# Patient Record
Sex: MALE | Race: WHITE | NOT HISPANIC OR LATINO | Employment: OTHER | URBAN - METROPOLITAN AREA
[De-identification: names, ages, dates, MRNs, and addresses within clinical notes are randomized per-mention and may not be internally consistent; named-entity substitution may affect disease eponyms.]

---

## 2017-09-28 ENCOUNTER — OFFICE VISIT (OUTPATIENT)
Dept: URGENT CARE | Facility: CLINIC | Age: 62
End: 2017-09-28
Payer: MEDICARE

## 2017-09-28 VITALS
SYSTOLIC BLOOD PRESSURE: 122 MMHG | HEART RATE: 69 BPM | BODY MASS INDEX: 27.28 KG/M2 | RESPIRATION RATE: 18 BRPM | TEMPERATURE: 99 F | WEIGHT: 180 LBS | HEIGHT: 68 IN | OXYGEN SATURATION: 98 % | DIASTOLIC BLOOD PRESSURE: 75 MMHG

## 2017-09-28 DIAGNOSIS — G43.011 INTRACTABLE MIGRAINE WITHOUT AURA AND WITH STATUS MIGRAINOSUS: Primary | ICD-10-CM

## 2017-09-28 PROCEDURE — 96372 THER/PROPH/DIAG INJ SC/IM: CPT | Mod: S$GLB,,, | Performed by: SURGERY

## 2017-09-28 PROCEDURE — 99203 OFFICE O/P NEW LOW 30 MIN: CPT | Mod: 25,S$GLB,, | Performed by: SURGERY

## 2017-09-28 RX ORDER — OMEPRAZOLE 20 MG/1
20 CAPSULE, DELAYED RELEASE ORAL DAILY
COMMUNITY
End: 2019-07-10

## 2017-09-28 RX ORDER — BUTALBITAL, ACETAMINOPHEN AND CAFFEINE 50; 325; 40 MG/1; MG/1; MG/1
2 TABLET ORAL EVERY 6 HOURS PRN
Qty: 30 TABLET | Refills: 0 | Status: SHIPPED | OUTPATIENT
Start: 2017-09-28 | End: 2017-10-05

## 2017-09-28 RX ORDER — VALPROIC ACID 250 MG/1
250 CAPSULE, LIQUID FILLED ORAL 4 TIMES DAILY
COMMUNITY
End: 2022-01-28 | Stop reason: SDUPTHER

## 2017-09-28 RX ORDER — DEXAMETHASONE SODIUM PHOSPHATE 100 MG/10ML
10 INJECTION INTRAMUSCULAR; INTRAVENOUS ONCE
Status: COMPLETED | OUTPATIENT
Start: 2017-09-28 | End: 2017-09-28

## 2017-09-28 RX ORDER — LEVOTHYROXINE SODIUM 112 UG/1
112 TABLET ORAL DAILY
COMMUNITY
End: 2019-07-10 | Stop reason: DRUGHIGH

## 2017-09-28 RX ORDER — MIRTAZAPINE 15 MG/1
15 TABLET, FILM COATED ORAL NIGHTLY
COMMUNITY
End: 2019-07-10 | Stop reason: DRUGHIGH

## 2017-09-28 RX ORDER — ATORVASTATIN CALCIUM 10 MG/1
10 TABLET, FILM COATED ORAL DAILY
COMMUNITY
End: 2019-07-10 | Stop reason: ALTCHOICE

## 2017-09-28 RX ORDER — LISINOPRIL 40 MG/1
40 TABLET ORAL DAILY
COMMUNITY
End: 2019-07-10

## 2017-09-28 RX ORDER — LORAZEPAM 1 MG/1
2 TABLET ORAL 2 TIMES DAILY
COMMUNITY
End: 2019-07-10 | Stop reason: SDUPTHER

## 2017-09-28 RX ADMIN — DEXAMETHASONE SODIUM PHOSPHATE 10 MG: 100 INJECTION INTRAMUSCULAR; INTRAVENOUS at 12:09

## 2017-09-28 NOTE — PATIENT INSTRUCTIONS
"  Migraine Headache: Stages and Treatment    A migraine headache tends to progress in stages. Learning these stages can help you better understand what is happening. Then you can learn ways to reduce pain and relieve other symptoms. Methods for relieving your symptoms include self-care and medicines.  Migraine stages  Migraines tend to progress through 4 stages. Many people don't have all stages, and stages may differ with each headache:  · Prodrome. A few hours to a day or so before the headache, you may feel tired, (yawning many times), uneasy, or shabazz. You may also feel bloated or crave certain foods.  · Aura. Up to an hour before the headache starts, some migraine sufferers experience aura--flashing lights, blind spots, other vision problems, confusion, difficulty speaking, or other neurologic symptoms.  · Headache. Moderate to severe pain affects one side of the head and then can spread to both sides, often along with nausea. You may be highly sensitive to light, sound, and odors. Vomiting or diarrhea may also happen. This stage lasts 4 to 72 hours.  · Postdrome. After your headache ends, you may feel tired, achy, and "washed out." This may last for a day or so.  Self-care during a migraine  Here is what you can do:  · Use a cold compress. Wrap a thin cloth around a cold pack, a cold can of soda, or a bag of frozen vegetables. Apply this to your temple or other pain site.  · Drink fluids. If nausea makes it hard to drink, try sucking on ice.  · Rest. If possible, lie down. Try not to bend over, as this may increase your pain. Sometimes laying in a dark quiet room can help the migraine from being aggravated.    · Try caffeine. Some people find that drinking fluids with caffeine, such as coffee or tea, helps to lessen migraine pain.  Using medicines  Work with your healthcare provider to find the right medicines for you. Medicines for migraine may relieve pain (analgesics), relieve nausea, or attack the " migraine's root causes (migraine-specific medicines).  Rebound headache  Taking analgesics each day, or even several times a week, may lead to more frequent and severe headaches. These are called rebound headaches. If you think you're having rebound headaches, tell your healthcare provider. He or she can help you safely decrease your medicine. Rebound caffeine withdrawal headaches can also happen.    Date Last Reviewed: 10/9/2015  © 8137-0536 [employer]. [address] [place] [address], [place] [address]. All rights reserved. This information is not intended as a substitute for professional medical care. Always follow your healthcare professional's instructions.

## 2017-09-28 NOTE — PROGRESS NOTES
"Subjective:       Patient ID: Anthony J Rockweiler is a 62 y.o. male.    Vitals:  height is 5' 8" (1.727 m) and weight is 81.6 kg (180 lb). His temperature is 98.5 °F (36.9 °C). His blood pressure is 122/75 and his pulse is 69. His respiration is 18 and oxygen saturation is 98%.     Chief Complaint: Headache    Headache    This is a chronic problem. The current episode started yesterday. The problem occurs constantly. The problem has been gradually worsening. The pain is located in the occipital and frontal region. The pain does not radiate. The pain quality is similar to prior headaches. The quality of the pain is described as pulsating and dull. The pain is at a severity of 7/10. The pain is moderate. Associated symptoms include nausea and scalp tenderness. Pertinent negatives include no blurred vision, dizziness, fever, neck pain, numbness, photophobia, seizures, tinnitus, vomiting or weakness. Nothing aggravates the symptoms. He has tried nothing for the symptoms. The treatment provided no relief. His past medical history is significant for migraine headaches.     Review of Systems   Constitution: Negative for chills, fever and weakness.   HENT: Negative for congestion and tinnitus.    Eyes: Negative for blurred vision and photophobia.   Skin: Negative for rash.   Musculoskeletal: Negative for neck pain.   Gastrointestinal: Positive for nausea. Negative for vomiting.   Neurological: Positive for headaches. Negative for disturbances in coordination, dizziness, numbness and seizures.   Psychiatric/Behavioral: Negative for altered mental status. The patient is not nervous/anxious.        Objective:      Physical Exam   Constitutional: He is oriented to person, place, and time. He appears well-developed and well-nourished. He is cooperative.  Non-toxic appearance. He does not appear ill. No distress.   HENT:   Head: Normocephalic and atraumatic.   Right Ear: Hearing, tympanic membrane, external ear and ear canal " normal.   Left Ear: Hearing, tympanic membrane, external ear and ear canal normal.   Nose: Nose normal. No mucosal edema, rhinorrhea or nasal deformity. No epistaxis. Right sinus exhibits no maxillary sinus tenderness and no frontal sinus tenderness. Left sinus exhibits no maxillary sinus tenderness and no frontal sinus tenderness.   Mouth/Throat: Uvula is midline, oropharynx is clear and moist and mucous membranes are normal. No trismus in the jaw. Normal dentition. No uvula swelling. No posterior oropharyngeal erythema.   Eyes: Conjunctivae and lids are normal. Right eye exhibits no discharge. Left eye exhibits no discharge. No scleral icterus.   Sclera clear bilat   Neck: Trachea normal, normal range of motion, full passive range of motion without pain and phonation normal. Neck supple.   Cardiovascular: Normal rate, regular rhythm, normal heart sounds, intact distal pulses and normal pulses.    Pulmonary/Chest: Effort normal and breath sounds normal. No respiratory distress.   Abdominal: Soft. Normal appearance and bowel sounds are normal. He exhibits no distension, no pulsatile midline mass and no mass. There is no tenderness.   Musculoskeletal: Normal range of motion. He exhibits no edema or deformity.   Neurological: He is alert and oriented to person, place, and time. He exhibits normal muscle tone. Coordination normal.   Skin: Skin is warm, dry and intact. He is not diaphoretic. No pallor.   Psychiatric: He has a normal mood and affect. His speech is normal and behavior is normal. Judgment and thought content normal. Cognition and memory are normal.   Nursing note and vitals reviewed.      Assessment:       1. Intractable migraine without aura and with status migrainosus        Plan:         Intractable migraine without aura and with status migrainosus  -     dexamethasone injection 10 mg; Inject 1 mL (10 mg total) into the muscle once.

## 2017-10-02 LAB — CRC RECOMMENDATION EXT: NORMAL

## 2017-10-04 ENCOUNTER — OFFICE VISIT (OUTPATIENT)
Dept: URGENT CARE | Facility: CLINIC | Age: 62
End: 2017-10-04
Payer: MEDICARE

## 2017-10-04 VITALS
RESPIRATION RATE: 18 BRPM | OXYGEN SATURATION: 100 % | SYSTOLIC BLOOD PRESSURE: 111 MMHG | HEART RATE: 67 BPM | TEMPERATURE: 97 F | WEIGHT: 180 LBS | HEIGHT: 68 IN | BODY MASS INDEX: 27.28 KG/M2 | DIASTOLIC BLOOD PRESSURE: 74 MMHG

## 2017-10-04 DIAGNOSIS — G43.909 MIGRAINE WITHOUT STATUS MIGRAINOSUS, NOT INTRACTABLE, UNSPECIFIED MIGRAINE TYPE: Primary | ICD-10-CM

## 2017-10-04 DIAGNOSIS — M54.9 UPPER BACK PAIN ON RIGHT SIDE: ICD-10-CM

## 2017-10-04 PROCEDURE — 99203 OFFICE O/P NEW LOW 30 MIN: CPT | Mod: S$GLB,,, | Performed by: FAMILY MEDICINE

## 2017-10-04 RX ORDER — OXYCODONE AND ACETAMINOPHEN 5; 325 MG/1; MG/1
1 TABLET ORAL EVERY 8 HOURS PRN
Qty: 20 TABLET | Refills: 0 | Status: SHIPPED | OUTPATIENT
Start: 2017-10-04 | End: 2017-10-11

## 2017-10-04 RX ORDER — CYCLOBENZAPRINE HCL 10 MG
10 TABLET ORAL NIGHTLY
Qty: 30 TABLET | Refills: 0 | Status: SHIPPED | OUTPATIENT
Start: 2017-10-04 | End: 2017-11-03

## 2017-10-04 RX ORDER — ONDANSETRON 4 MG/1
4 TABLET, ORALLY DISINTEGRATING ORAL EVERY 8 HOURS PRN
Qty: 20 TABLET | Refills: 0 | Status: SHIPPED | OUTPATIENT
Start: 2017-10-04 | End: 2017-10-11
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
## 2017-10-04 NOTE — PATIENT INSTRUCTIONS

## 2017-11-29 ENCOUNTER — OFFICE VISIT (OUTPATIENT)
Dept: URGENT CARE | Facility: CLINIC | Age: 62
End: 2017-11-29
Payer: MEDICARE

## 2017-11-29 VITALS
HEART RATE: 67 BPM | TEMPERATURE: 97 F | HEIGHT: 68 IN | WEIGHT: 180 LBS | DIASTOLIC BLOOD PRESSURE: 90 MMHG | SYSTOLIC BLOOD PRESSURE: 133 MMHG | BODY MASS INDEX: 27.28 KG/M2 | OXYGEN SATURATION: 98 %

## 2017-11-29 DIAGNOSIS — G43.909 MIGRAINE WITHOUT STATUS MIGRAINOSUS, NOT INTRACTABLE, UNSPECIFIED MIGRAINE TYPE: Primary | ICD-10-CM

## 2017-11-29 PROCEDURE — 99214 OFFICE O/P EST MOD 30 MIN: CPT | Mod: 25,S$GLB,, | Performed by: NURSE PRACTITIONER

## 2017-11-29 PROCEDURE — 96372 THER/PROPH/DIAG INJ SC/IM: CPT | Mod: S$GLB,,, | Performed by: EMERGENCY MEDICINE

## 2017-11-29 RX ORDER — KETOROLAC TROMETHAMINE 30 MG/ML
30 INJECTION, SOLUTION INTRAMUSCULAR; INTRAVENOUS
Status: COMPLETED | OUTPATIENT
Start: 2017-11-29 | End: 2017-11-29

## 2017-11-29 RX ORDER — ONDANSETRON 2 MG/ML
4 INJECTION INTRAMUSCULAR; INTRAVENOUS
Status: COMPLETED | OUTPATIENT
Start: 2017-11-29 | End: 2017-11-29

## 2017-11-29 RX ADMIN — ONDANSETRON 4 MG: 2 INJECTION INTRAMUSCULAR; INTRAVENOUS at 04:11

## 2017-11-29 RX ADMIN — KETOROLAC TROMETHAMINE 30 MG: 30 INJECTION, SOLUTION INTRAMUSCULAR; INTRAVENOUS at 04:11

## 2017-11-29 NOTE — PATIENT INSTRUCTIONS
Please return here or go to the Emergency Department for any concerns or worsening of condition.  If you were prescribed antibiotics, please take them to completion.  Please drink plenty of fluids.  Please get plenty of rest.  If not allergic, please take over the counter Tylenol (Acetaminophen) and/or Motrin (Ibuprofen) as directed for control of pain and/or fever.  Please follow up with your primary care doctor or specialist as needed.    If you  smoke, please stop smoking.      Migraine Headache  This often severe type of headache is different from other types of headaches in that symptoms other than pain occur with the headache. Nausea and vomiting, lightheadedness, sensitivity to light (photophobia), and other visual disturbances are common migraine symptoms. The pain may last from a few hours to several days. It is not clear why migraines occur but certain factors called triggers can raise the risk of having a migraine attack. A migraine may be triggered by emotional stress or depression, or by hormone changes during the menstrual cycle. Other triggers include birth control pills, overuse of migraine medicines, alcohol or caffeine, foods with tyramine (such as aged cheese and wine), eyestrain, weather changes, missed meals, or too little or too much sleep.  Home care  Follow these tips when taking care of yourself at home:  · Dont drive yourself home if you were given pain medicine for your headache or are having visual symptoms. Instead, have someone else drive you home. Try to sleep when you get home. You should feel much better when you wake up.  · Cold can help ease migraine symptoms. Put an ice pack on your forehead or at the base of your skull. Put heat on the back of your neck to help ease any neck spasm.  · Drink only clear liquids or eat a light diet until your symptoms get better. This will help you avoid nausea and vomiting.  How to prevent migraines  Pay attention to what seems to trigger your  headache. Try to avoid the triggers when you can. If you have frequent headaches, consider keeping a headache diary. In it, write down what you were doing, feeling, or eating in the hours before each headache. Show this to your healthcare provider to help find the cause of your headaches.  If stress seems to be a trigger for your headaches, figure out what is causing stress in your life. Learn new ways to handle your stress. Ideas include regular exercise, biofeedback, self-hypnosis, yoga, and meditation. Talk with your healthcare provider to find out more information about managing stress. Many books and digital media are also available on this subject.  Tyramine is a substance found in many foods. It can trigger a migraine in some people. These foods contain tyramine:  · Chocolate  · Yogurt  · All cheeses, but especially aged cheeses  · Smoked or pickled fish and meat, including herring, caviar, bologna, pepperoni, and salami  · Liver  · Avocados  · Bananas  · Figs  · Raisins  · Red wine  Try staying away from these foods for 1 to 2 months to see if you have fewer headaches.  How to treat future headaches  · Take time out at the first sign of a headache, if possible. Find a quiet, dark, comfortable place to sit or lie down. Let yourself relax or sleep.  · Put an ice pack on your forehead or on the area of greatest pain. A heating pad and massage may help if you are having a muscle spasm and tightness in your neck.  · If you have been prescribed a medicine to stop a migraine headache, use this at the first warning sign of the headache for best results. First signs may be an aura or pain.  · If you need to take medicine often for your migraine, talk with your healthcare provider about other ways to prevent your headaches.  Follow-up care  Follow up with your healthcare provider, or as advised. Talk with your provider if you have frequent headaches. He or she can figure out a treatment plan. Ask if you can have  medicine to take at home the next time you get a bad headache. This may keep you from having to visit the emergency department in the future. You may need to see a headache specialist (neurologist) if you continue to have headaches.  When to seek medical advice  Call your healthcare provider right away if any of these occur:  · Your head pain gets worse, or doesnt get better within 24 hours  · You cant keep liquids down (repeated vomiting)  · Pain in your sinuses, ears, or throat  · Fever of 100.4º F (38º C) or higher, or as directed by your healthcare provider  · Stiff neck  · Extreme drowsiness, confusion, or fainting  · Dizziness, or dizziness with spinning sensation (vertigo)  · Weakness in an arm or leg, or on one side of your face  · Difficulty talking or seeing  Date Last Reviewed: 8/1/2016  © 2285-9798 [employer]. [address] [place] [address]. All rights reserved. This information is not intended as a substitute for professional medical care. Always follow your healthcare professional's instructions.

## 2017-11-29 NOTE — PROGRESS NOTES
"Subjective:       Patient ID: Anthony J Rockweiler is a 62 y.o. male.    Vitals:  height is 5' 8" (1.727 m) and weight is 81.6 kg (180 lb). His temperature is 97.1 °F (36.2 °C). His blood pressure is 133/90 (abnormal) and his pulse is 67. His oxygen saturation is 98%.     Chief Complaint: Headache    Mr Rockweiler comes to the clinic with complaint of occipital head pain x 1 day (this AM).  He took Excedrin which he states usually helps, but feels he may have been too late and symptoms have persisted since that time.  He denies prodrome, but endorses nausea and photophobia.  He appears very uncomfortable. He states this is a chronic issue and he is seen by a Neurologist and will contact them soon as this is the third time in so many months he has been seen for the same issue.      Headache    This is a recurrent problem. The current episode started today. The problem occurs constantly. The problem has been gradually worsening. Pain location: whole head back and forth. Radiates to: all around the head. The pain quality is similar to prior headaches. The quality of the pain is described as throbbing. The pain is at a severity of 10/10. The pain is severe. Associated symptoms include nausea and photophobia. Pertinent negatives include no abdominal pain, back pain, blurred vision, fever, sore throat or vomiting. Exacerbated by: walking. Treatments tried: excedrin. The treatment provided no relief.     Review of Systems   Constitution: Negative for chills and fever.   HENT: Negative for sore throat.    Eyes: Positive for photophobia. Negative for blurred vision.   Cardiovascular: Negative for chest pain.   Respiratory: Negative for shortness of breath.    Skin: Negative for rash.   Musculoskeletal: Negative for back pain and joint pain.   Gastrointestinal: Positive for nausea. Negative for abdominal pain, diarrhea and vomiting.   Neurological: Positive for headaches.   Psychiatric/Behavioral: The patient is not " nervous/anxious.    All other systems reviewed and are negative.      Objective:      Physical Exam   Constitutional: He is oriented to person, place, and time. He appears well-developed and well-nourished. He is cooperative.  Non-toxic appearance. He does not appear ill. No distress.   HENT:   Head: Normocephalic and atraumatic.   Right Ear: Hearing, tympanic membrane, external ear and ear canal normal.   Left Ear: Hearing, tympanic membrane, external ear and ear canal normal.   Nose: Nose normal. No mucosal edema, rhinorrhea or nasal deformity. No epistaxis. Right sinus exhibits no maxillary sinus tenderness and no frontal sinus tenderness. Left sinus exhibits no maxillary sinus tenderness and no frontal sinus tenderness.   Mouth/Throat: Uvula is midline and mucous membranes are normal. No trismus in the jaw. Normal dentition. No uvula swelling. No posterior oropharyngeal erythema.   No temporal TTP   Eyes: Conjunctivae, EOM and lids are normal. Pupils are equal, round, and reactive to light. No scleral icterus. Right eye exhibits no nystagmus. Left eye exhibits no nystagmus.   Sclera clear bilat   Neck: Trachea normal, normal range of motion, full passive range of motion without pain and phonation normal. Neck supple. No neck rigidity.   Cardiovascular: Normal rate, regular rhythm, normal heart sounds, intact distal pulses and normal pulses.    Pulmonary/Chest: Effort normal and breath sounds normal. No respiratory distress. He has no decreased breath sounds. He has no wheezes. He has no rhonchi. He has no rales.   Abdominal: Soft. Normal appearance and bowel sounds are normal. He exhibits no distension. There is no tenderness.   Musculoskeletal: Normal range of motion. He exhibits no edema or deformity.   Neurological: He is alert and oriented to person, place, and time. He displays no tremor. No cranial nerve deficit. He exhibits normal muscle tone. Coordination normal.   Skin: Skin is warm, dry and intact. He  is not diaphoretic. No pallor.   Psychiatric: He has a normal mood and affect. His speech is normal and behavior is normal. Judgment and thought content normal. Cognition and memory are normal.   Nursing note and vitals reviewed.      Assessment:       1. Migraine without status migrainosus, not intractable, unspecified migraine type        Plan:         Migraine without status migrainosus, not intractable, unspecified migraine type  -     ondansetron injection 4 mg; Inject 4 mg into the muscle one time.  -     ketorolac injection 30 mg; Inject 30 mg into the muscle one time.      - discussed follow up with his neurologist at first available appointment given the number of migraines over the last 3 months    Patient Instructions   Please return here or go to the Emergency Department for any concerns or worsening of condition.  If you were prescribed antibiotics, please take them to completion.  Please drink plenty of fluids.  Please get plenty of rest.  If not allergic, please take over the counter Tylenol (Acetaminophen) and/or Motrin (Ibuprofen) as directed for control of pain and/or fever.  Please follow up with your primary care doctor or specialist as needed.    If you  smoke, please stop smoking.      Migraine Headache  This often severe type of headache is different from other types of headaches in that symptoms other than pain occur with the headache. Nausea and vomiting, lightheadedness, sensitivity to light (photophobia), and other visual disturbances are common migraine symptoms. The pain may last from a few hours to several days. It is not clear why migraines occur but certain factors called triggers can raise the risk of having a migraine attack. A migraine may be triggered by emotional stress or depression, or by hormone changes during the menstrual cycle. Other triggers include birth control pills, overuse of migraine medicines, alcohol or caffeine, foods with tyramine (such as aged cheese and  wine), eyestrain, weather changes, missed meals, or too little or too much sleep.  Home care  Follow these tips when taking care of yourself at home:  · Dont drive yourself home if you were given pain medicine for your headache or are having visual symptoms. Instead, have someone else drive you home. Try to sleep when you get home. You should feel much better when you wake up.  · Cold can help ease migraine symptoms. Put an ice pack on your forehead or at the base of your skull. Put heat on the back of your neck to help ease any neck spasm.  · Drink only clear liquids or eat a light diet until your symptoms get better. This will help you avoid nausea and vomiting.  How to prevent migraines  Pay attention to what seems to trigger your headache. Try to avoid the triggers when you can. If you have frequent headaches, consider keeping a headache diary. In it, write down what you were doing, feeling, or eating in the hours before each headache. Show this to your healthcare provider to help find the cause of your headaches.  If stress seems to be a trigger for your headaches, figure out what is causing stress in your life. Learn new ways to handle your stress. Ideas include regular exercise, biofeedback, self-hypnosis, yoga, and meditation. Talk with your healthcare provider to find out more information about managing stress. Many books and digital media are also available on this subject.  Tyramine is a substance found in many foods. It can trigger a migraine in some people. These foods contain tyramine:  · Chocolate  · Yogurt  · All cheeses, but especially aged cheeses  · Smoked or pickled fish and meat, including herring, caviar, bologna, pepperoni, and salami  · Liver  · Avocados  · Bananas  · Figs  · Raisins  · Red wine  Try staying away from these foods for 1 to 2 months to see if you have fewer headaches.  How to treat future headaches  · Take time out at the first sign of a headache, if possible. Find a quiet,  dark, comfortable place to sit or lie down. Let yourself relax or sleep.  · Put an ice pack on your forehead or on the area of greatest pain. A heating pad and massage may help if you are having a muscle spasm and tightness in your neck.  · If you have been prescribed a medicine to stop a migraine headache, use this at the first warning sign of the headache for best results. First signs may be an aura or pain.  · If you need to take medicine often for your migraine, talk with your healthcare provider about other ways to prevent your headaches.  Follow-up care  Follow up with your healthcare provider, or as advised. Talk with your provider if you have frequent headaches. He or she can figure out a treatment plan. Ask if you can have medicine to take at home the next time you get a bad headache. This may keep you from having to visit the emergency department in the future. You may need to see a headache specialist (neurologist) if you continue to have headaches.  When to seek medical advice  Call your healthcare provider right away if any of these occur:  · Your head pain gets worse, or doesnt get better within 24 hours  · You cant keep liquids down (repeated vomiting)  · Pain in your sinuses, ears, or throat  · Fever of 100.4º F (38º C) or higher, or as directed by your healthcare provider  · Stiff neck  · Extreme drowsiness, confusion, or fainting  · Dizziness, or dizziness with spinning sensation (vertigo)  · Weakness in an arm or leg, or on one side of your face  · Difficulty talking or seeing  Date Last Reviewed: 8/1/2016 © 2000-2017 [employer]. [address] [place] [address], [place] [address]. All rights reserved. This information is not intended as a substitute for professional medical care. Always follow your healthcare professional's instructions.

## 2018-03-07 ENCOUNTER — OFFICE VISIT (OUTPATIENT)
Dept: URGENT CARE | Facility: CLINIC | Age: 63
End: 2018-03-07
Payer: MEDICARE

## 2018-03-07 VITALS
OXYGEN SATURATION: 99 % | BODY MASS INDEX: 27.28 KG/M2 | HEIGHT: 68 IN | SYSTOLIC BLOOD PRESSURE: 147 MMHG | DIASTOLIC BLOOD PRESSURE: 95 MMHG | HEART RATE: 64 BPM | RESPIRATION RATE: 16 BRPM | WEIGHT: 180 LBS | TEMPERATURE: 97 F

## 2018-03-07 DIAGNOSIS — G43.919 INTRACTABLE MIGRAINE WITHOUT STATUS MIGRAINOSUS, UNSPECIFIED MIGRAINE TYPE: Primary | ICD-10-CM

## 2018-03-07 PROCEDURE — 96372 THER/PROPH/DIAG INJ SC/IM: CPT | Mod: S$GLB,,, | Performed by: FAMILY MEDICINE

## 2018-03-07 PROCEDURE — 99213 OFFICE O/P EST LOW 20 MIN: CPT | Mod: 25,S$GLB,, | Performed by: NURSE PRACTITIONER

## 2018-03-07 RX ORDER — PROMETHAZINE HYDROCHLORIDE 25 MG/1
25 TABLET ORAL EVERY 6 HOURS PRN
Qty: 20 TABLET | Refills: 0 | Status: SHIPPED | OUTPATIENT
Start: 2018-03-07 | End: 2018-03-12

## 2018-03-07 RX ORDER — ONDANSETRON 2 MG/ML
4 INJECTION INTRAMUSCULAR; INTRAVENOUS
Status: COMPLETED | OUTPATIENT
Start: 2018-03-07 | End: 2018-03-07

## 2018-03-07 RX ORDER — KETOROLAC TROMETHAMINE 30 MG/ML
30 INJECTION, SOLUTION INTRAMUSCULAR; INTRAVENOUS
Status: COMPLETED | OUTPATIENT
Start: 2018-03-07 | End: 2018-03-07

## 2018-03-07 RX ADMIN — KETOROLAC TROMETHAMINE 30 MG: 30 INJECTION, SOLUTION INTRAMUSCULAR; INTRAVENOUS at 05:03

## 2018-03-07 RX ADMIN — ONDANSETRON 4 MG: 2 INJECTION INTRAMUSCULAR; INTRAVENOUS at 05:03

## 2018-03-07 NOTE — PROGRESS NOTES
"Subjective:       Patient ID: Anthony J Rockweiler is a 62 y.o. male.    Vitals:  height is 5' 8" (1.727 m) and weight is 81.6 kg (180 lb). His temperature is 97.3 °F (36.3 °C). His blood pressure is 147/95 (abnormal) and his pulse is 64. His respiration is 16 and oxygen saturation is 99%.     Chief Complaint: Headache    Pt states he is having trouble sleeping at night for the past several months andis getting more frequent migraines.  Pt tried excedrin at home but states it did not work for him.      Headache    This is a chronic problem. The current episode started yesterday. The problem has been gradually worsening. The pain quality is similar to prior headaches. Associated symptoms include nausea. Pertinent negatives include no blurred vision, dizziness or vomiting. He has tried Excedrin for the symptoms. The treatment provided mild relief.     Review of Systems   Eyes: Negative for blurred vision.   Cardiovascular: Negative for chest pain.   Gastrointestinal: Positive for nausea. Negative for vomiting.   Neurological: Positive for headaches. Negative for dizziness.       Objective:      Physical Exam   Constitutional: He is oriented to person, place, and time. Vital signs are normal. He appears well-developed and well-nourished. He is cooperative.  Non-toxic appearance. He does not have a sickly appearance. He does not appear ill. No distress.   HENT:   Head: Normocephalic and atraumatic.   Right Ear: Hearing, tympanic membrane, external ear and ear canal normal.   Left Ear: Hearing, tympanic membrane, external ear and ear canal normal.   Nose: Nose normal. No mucosal edema, rhinorrhea or nasal deformity. No epistaxis. Right sinus exhibits no maxillary sinus tenderness and no frontal sinus tenderness. Left sinus exhibits no maxillary sinus tenderness and no frontal sinus tenderness.   Mouth/Throat: Uvula is midline, oropharynx is clear and moist and mucous membranes are normal. No trismus in the jaw. Normal " dentition. No uvula swelling. No posterior oropharyngeal erythema.   No temporal TTP   Eyes: Conjunctivae, EOM and lids are normal. Pupils are equal, round, and reactive to light. No scleral icterus.   Sclera clear bilat   Neck: Normal range of motion and full passive range of motion without pain. Neck supple. No neck rigidity.   Cardiovascular: Normal rate, regular rhythm and normal heart sounds.    Pulmonary/Chest: Effort normal and breath sounds normal. No accessory muscle usage. No apnea, no tachypnea and no bradypnea. No respiratory distress. He has no decreased breath sounds. He has no wheezes. He has no rhonchi. He has no rales.   Abdominal: Normal appearance.   Musculoskeletal: Normal range of motion. He exhibits no edema or deformity.   Neurological: He is alert and oriented to person, place, and time. He has normal strength. He exhibits normal muscle tone. Coordination and gait normal.   Skin: Skin is warm, dry and intact. He is not diaphoretic.   Psychiatric: He has a normal mood and affect. His speech is normal and behavior is normal.   Nursing note and vitals reviewed.      Assessment:       1. Intractable migraine without status migrainosus, unspecified migraine type        Plan:         Intractable migraine without status migrainosus, unspecified migraine type  -     ondansetron injection 4 mg; Inject 4 mg into the muscle one time.  -     ketorolac injection 30 mg; Inject 30 mg into the muscle one time.  -     promethazine (PHENERGAN) 25 MG tablet; Take 1 tablet (25 mg total) by mouth every 6 (six) hours as needed for Nausea.  Dispense: 20 tablet; Refill: 0      Discussed with pt to follow up with his neurologist for increase in frequency of migraines due to insomnia.

## 2018-03-07 NOTE — PATIENT INSTRUCTIONS
"Please return here or go to the Emergency Department for any concerns or worsening of condition.  If you were prescribed antibiotics, please take them to completion.  If you were prescribed a narcotic medication, do not drive or operate heavy equipment or machinery while taking these medications.  Please drink plenty of fluids.  Please get plenty of rest.  If not allergic, please take over the counter Tylenol (Acetaminophen) and/or Motrin (Ibuprofen) as directed for control of pain and/or fever.  Please follow up with your primary care doctor or specialist as needed.    If you  smoke, please stop smoking.  Migraine Headache: Stages and Treatment    A migraine headache tends to progress in stages. Learning these stages can help you better understand what is happening. Then you can learn ways to reduce pain and relieve other symptoms. Methods for relieving your symptoms include self-care and medicines.  Migraine stages  Migraines tend to progress through 4 stages. Many people don't have all stages, and stages may differ with each headache:  · Prodrome. A few hours to a day or so before the headache, you may feel tired, (yawning many times), uneasy, or shabazz. You may also feel bloated or crave certain foods.  · Aura. Up to an hour before the headache starts, some migraine sufferers experience aura--flashing lights, blind spots, other vision problems, confusion, difficulty speaking, or other neurologic symptoms.  · Headache. Moderate to severe pain affects one side of the head and then can spread to both sides, often along with nausea. You may be highly sensitive to light, sound, and odors. Vomiting or diarrhea may also happen. This stage lasts 4 to 72 hours.  · Postdrome. After your headache ends, you may feel tired, achy, and "washed out." This may last for a day or so.  Self-care during a migraine  Here is what you can do:  · Use a cold compress. Wrap a thin cloth around a cold pack, a cold can of soda, or a bag of " frozen vegetables. Apply this to your temple or other pain site.  · Drink fluids. If nausea makes it hard to drink, try sucking on ice.  · Rest. If possible, lie down. Try not to bend over, as this may increase your pain. Sometimes laying in a dark quiet room can help the migraine from being aggravated.    · Try caffeine. Some people find that drinking fluids with caffeine, such as coffee or tea, helps to lessen migraine pain.  Using medicines  Work with your healthcare provider to find the right medicines for you. Medicines for migraine may relieve pain (analgesics), relieve nausea, or attack the migraine's root causes (migraine-specific medicines).  Rebound headache  Taking analgesics each day, or even several times a week, may lead to more frequent and severe headaches. These are called rebound headaches. If you think you're having rebound headaches, tell your healthcare provider. He or she can help you safely decrease your medicine. Rebound caffeine withdrawal headaches can also happen.    Date Last Reviewed: 10/9/2015  © 7043-9107 [employer]. [address] [place] [address], [place] [address]. All rights reserved. This information is not intended as a substitute for professional medical care. Always follow your healthcare professional's instructions.        Preventing Migraine Headaches: Triggers     Red wine is a common migraine trigger.     The first step in preventing migraines is to learn what triggers them. You may then be able to control your triggers to avoid or reduce the severity of your migraines.  Know your triggers  Be aware that you may have more than one trigger, and that some triggers may work together. Common migraine triggers include:  · Food and nutrition. Skipping meals or not drinking enough water can trigger headaches. So can certain foods, such as caffeine, monosodium glutamate (MSG), aged cheese, or sausage.  · Alcohol. Red wine and other alcoholic beverages are common migraine  triggers.  · Chemicals. Scents, cleaning products, gasoline, glue, perfume, and paint can be triggers. So can tobacco smoke, including secondhand smoke.  · Emotions. Stress can trigger headaches or make them worse once they begin.  · Sleep disruption. Staying up late, sleeping late, and traveling across time zones can disrupt your sleep cycle, triggering headaches.  · Hormones. Many women notice that migraines tend to happen at a certain point in their menstrual cycle. Birth control pills or hormone replacement therapy may also trigger migraines.  · Environment and weather. Air travel, changes in altitude, air pressure changes, hot sun, or bright or flashing lights can be triggers.  Control your triggers  These are some of the things you can do to try to control triggers:  · Avoid triggers if you can. For example, stay clear of alcohol and foods that trigger your headaches. Use unscented household products. Keep regular sleep habits. Manage stress to help control emotional triggers.  · Change your behavior at times when triggers can't be avoided. For example, make sure to get enough rest and drink plenty of water while you're traveling. Make sure to carry a hat, sunglasses, and your medicines. Be alert for migraine symptoms, so you can treat a migraine early if it happens.  Date Last Reviewed: 10/9/2015  © 3548-4408 [employer]. [address] [place] [address], [place] [address]. All rights reserved. This information is not intended as a substitute for professional medical care. Always follow your healthcare professional's instructions.

## 2018-04-28 ENCOUNTER — OFFICE VISIT (OUTPATIENT)
Dept: URGENT CARE | Facility: CLINIC | Age: 63
End: 2018-04-28
Payer: MEDICARE

## 2018-04-28 VITALS
WEIGHT: 170 LBS | DIASTOLIC BLOOD PRESSURE: 90 MMHG | HEIGHT: 68 IN | HEART RATE: 61 BPM | OXYGEN SATURATION: 98 % | SYSTOLIC BLOOD PRESSURE: 146 MMHG | BODY MASS INDEX: 25.76 KG/M2 | TEMPERATURE: 97 F

## 2018-04-28 DIAGNOSIS — G43.909 MIGRAINE WITHOUT STATUS MIGRAINOSUS, NOT INTRACTABLE, UNSPECIFIED MIGRAINE TYPE: Primary | ICD-10-CM

## 2018-04-28 PROCEDURE — 96372 THER/PROPH/DIAG INJ SC/IM: CPT | Mod: S$GLB,,, | Performed by: EMERGENCY MEDICINE

## 2018-04-28 PROCEDURE — 99214 OFFICE O/P EST MOD 30 MIN: CPT | Mod: 25,S$GLB,, | Performed by: NURSE PRACTITIONER

## 2018-04-28 RX ORDER — CARVEDILOL 12.5 MG/1
12.5 TABLET ORAL 2 TIMES DAILY WITH MEALS
COMMUNITY

## 2018-04-28 RX ORDER — KETOROLAC TROMETHAMINE 30 MG/ML
30 INJECTION, SOLUTION INTRAMUSCULAR; INTRAVENOUS
Status: COMPLETED | OUTPATIENT
Start: 2018-04-28 | End: 2018-04-28

## 2018-04-28 RX ADMIN — KETOROLAC TROMETHAMINE 30 MG: 30 INJECTION, SOLUTION INTRAMUSCULAR; INTRAVENOUS at 02:04

## 2018-04-28 NOTE — PROGRESS NOTES
"Subjective:       Patient ID: Anthony J Rockweiler is a 63 y.o. male.    Vitals:  height is 5' 8" (1.727 m) and weight is 77.1 kg (170 lb). His temperature is 96.8 °F (36 °C). His blood pressure is 146/90 (abnormal) and his pulse is 61. His oxygen saturation is 98%.     Chief Complaint: Headache    Headache    This is a recurrent problem. The current episode started 1 to 4 weeks ago. The problem occurs constantly. The problem has been unchanged. The pain is located in the frontal region. The quality of the pain is described as aching. The pain is at a severity of 6/10. The pain is mild. Pertinent negatives include no abdominal pain, back pain, blurred vision, fever, nausea, sore throat or vomiting. The symptoms are aggravated by bright light. He has tried NSAIDs for the symptoms. The treatment provided mild relief.     Review of Systems   Constitution: Negative for chills and fever.   HENT: Negative for sore throat.    Eyes: Negative for blurred vision.   Cardiovascular: Negative for chest pain.   Respiratory: Negative for shortness of breath.    Skin: Negative for rash.   Musculoskeletal: Negative for back pain and joint pain.   Gastrointestinal: Negative for abdominal pain, diarrhea, nausea and vomiting.   Neurological: Positive for headaches.   Psychiatric/Behavioral: The patient is not nervous/anxious.        Objective:      Physical Exam   Constitutional: He is oriented to person, place, and time. He appears well-developed and well-nourished. He is cooperative.  Non-toxic appearance. He does not appear ill. No distress.   HENT:   Head: Normocephalic and atraumatic.   Right Ear: Hearing, tympanic membrane, external ear and ear canal normal.   Left Ear: Hearing, tympanic membrane, external ear and ear canal normal.   Nose: Nose normal. No mucosal edema, rhinorrhea or nasal deformity. No epistaxis. Right sinus exhibits no maxillary sinus tenderness and no frontal sinus tenderness. Left sinus exhibits no maxillary " sinus tenderness and no frontal sinus tenderness.   Mouth/Throat: Uvula is midline, oropharynx is clear and moist and mucous membranes are normal. No trismus in the jaw. Normal dentition. No uvula swelling. No posterior oropharyngeal erythema.   No temporal TTP   Eyes: Conjunctivae, EOM and lids are normal. Pupils are equal, round, and reactive to light. No scleral icterus.   Sclera clear bilat   Neck: Trachea normal, normal range of motion, full passive range of motion without pain and phonation normal. Neck supple. No neck rigidity.   Cardiovascular: Normal rate, regular rhythm, normal heart sounds, intact distal pulses and normal pulses.    Pulmonary/Chest: Effort normal and breath sounds normal. No respiratory distress.   Abdominal: Soft. Normal appearance and bowel sounds are normal. He exhibits no distension. There is no tenderness.   Musculoskeletal: Normal range of motion. He exhibits no edema or deformity.   Neurological: He is alert and oriented to person, place, and time. No cranial nerve deficit. He exhibits normal muscle tone. Coordination normal.   Headache   Skin: Skin is warm, dry and intact. He is not diaphoretic. No pallor.   Psychiatric: He has a normal mood and affect. His speech is normal and behavior is normal. Judgment and thought content normal. Cognition and memory are normal.   Nursing note and vitals reviewed.      Assessment:       1. Migraine without status migrainosus, not intractable, unspecified migraine type        Plan:         Migraine without status migrainosus, not intractable, unspecified migraine type    Other orders  -     ketorolac injection 30 mg; Inject 1 mL (30 mg total) into the muscle one time.        Preventing Migraine Headaches: Triggers     Red wine is a common migraine trigger.     The first step in preventing migraines is to learn what triggers them. You may then be able to control your triggers to avoid or reduce the severity of your migraines.  Know your  triggers  Be aware that you may have more than one trigger, and that some triggers may work together. Common migraine triggers include:  · Food and nutrition. Skipping meals or not drinking enough water can trigger headaches. So can certain foods, such as caffeine, monosodium glutamate (MSG), aged cheese, or sausage.  · Alcohol. Red wine and other alcoholic beverages are common migraine triggers.  · Chemicals. Scents, cleaning products, gasoline, glue, perfume, and paint can be triggers. So can tobacco smoke, including secondhand smoke.  · Emotions. Stress can trigger headaches or make them worse once they begin.  · Sleep disruption. Staying up late, sleeping late, and traveling across time zones can disrupt your sleep cycle, triggering headaches.  · Hormones. Many women notice that migraines tend to happen at a certain point in their menstrual cycle. Birth control pills or hormone replacement therapy may also trigger migraines.  · Environment and weather. Air travel, changes in altitude, air pressure changes, hot sun, or bright or flashing lights can be triggers.  Control your triggers  These are some of the things you can do to try to control triggers:  · Avoid triggers if you can. For example, stay clear of alcohol and foods that trigger your headaches. Use unscented household products. Keep regular sleep habits. Manage stress to help control emotional triggers.  · Change your behavior at times when triggers can't be avoided. For example, make sure to get enough rest and drink plenty of water while you're traveling. Make sure to carry a hat, sunglasses, and your medicines. Be alert for migraine symptoms, so you can treat a migraine early if it happens.  Date Last Reviewed: 10/9/2015  © 5179-0866 [employer]. [address] [place] [address], [place] [address]. All rights reserved. This information is not intended as a substitute for professional medical care. Always follow your healthcare professional's  instructions.      Please return here or go to the Emergency Department for any concerns or worsening of condition.  Please drink plenty of fluids.  Please get plenty of rest.  If not allergic, please take over the counter Tylenol (Acetaminophen) and/or Motrin (Ibuprofen) as directed for control of pain and/or fever.  Please follow up with your primary care doctor or specialist as needed.    If you  smoke, please stop smoking.

## 2018-04-28 NOTE — PATIENT INSTRUCTIONS
Preventing Migraine Headaches: Triggers     Red wine is a common migraine trigger.     The first step in preventing migraines is to learn what triggers them. You may then be able to control your triggers to avoid or reduce the severity of your migraines.  Know your triggers  Be aware that you may have more than one trigger, and that some triggers may work together. Common migraine triggers include:  · Food and nutrition. Skipping meals or not drinking enough water can trigger headaches. So can certain foods, such as caffeine, monosodium glutamate (MSG), aged cheese, or sausage.  · Alcohol. Red wine and other alcoholic beverages are common migraine triggers.  · Chemicals. Scents, cleaning products, gasoline, glue, perfume, and paint can be triggers. So can tobacco smoke, including secondhand smoke.  · Emotions. Stress can trigger headaches or make them worse once they begin.  · Sleep disruption. Staying up late, sleeping late, and traveling across time zones can disrupt your sleep cycle, triggering headaches.  · Hormones. Many women notice that migraines tend to happen at a certain point in their menstrual cycle. Birth control pills or hormone replacement therapy may also trigger migraines.  · Environment and weather. Air travel, changes in altitude, air pressure changes, hot sun, or bright or flashing lights can be triggers.  Control your triggers  These are some of the things you can do to try to control triggers:  · Avoid triggers if you can. For example, stay clear of alcohol and foods that trigger your headaches. Use unscented household products. Keep regular sleep habits. Manage stress to help control emotional triggers.  · Change your behavior at times when triggers can't be avoided. For example, make sure to get enough rest and drink plenty of water while you're traveling. Make sure to carry a hat, sunglasses, and your medicines. Be alert for migraine symptoms, so you can treat a migraine early if it  happens.  Date Last Reviewed: 10/9/2015  © 3770-9894 [employer]. [address] [place] [address], [place] [address]. All rights reserved. This information is not intended as a substitute for professional medical care. Always follow your healthcare professional's instructions.      Please return here or go to the Emergency Department for any concerns or worsening of condition.  Please drink plenty of fluids.  Please get plenty of rest.  If not allergic, please take over the counter Tylenol (Acetaminophen) and/or Motrin (Ibuprofen) as directed for control of pain and/or fever.  Please follow up with your primary care doctor or specialist as needed.    If you  smoke, please stop smoking.

## 2018-06-07 ENCOUNTER — OFFICE VISIT (OUTPATIENT)
Dept: URGENT CARE | Facility: CLINIC | Age: 63
End: 2018-06-07
Payer: MEDICARE

## 2018-06-07 VITALS
HEIGHT: 68 IN | TEMPERATURE: 97 F | BODY MASS INDEX: 25.76 KG/M2 | OXYGEN SATURATION: 98 % | SYSTOLIC BLOOD PRESSURE: 162 MMHG | DIASTOLIC BLOOD PRESSURE: 99 MMHG | HEART RATE: 66 BPM | WEIGHT: 170 LBS | RESPIRATION RATE: 20 BRPM

## 2018-06-07 DIAGNOSIS — G43.519 MIGRAINE AURA, PERSISTENT, INTRACTABLE: Primary | ICD-10-CM

## 2018-06-07 PROCEDURE — 96372 THER/PROPH/DIAG INJ SC/IM: CPT | Mod: S$GLB,,, | Performed by: NURSE PRACTITIONER

## 2018-06-07 PROCEDURE — 99214 OFFICE O/P EST MOD 30 MIN: CPT | Mod: 25,S$GLB,, | Performed by: NURSE PRACTITIONER

## 2018-06-07 RX ORDER — BETAMETHASONE SODIUM PHOSPHATE AND BETAMETHASONE ACETATE 3; 3 MG/ML; MG/ML
9 INJECTION, SUSPENSION INTRA-ARTICULAR; INTRALESIONAL; INTRAMUSCULAR; SOFT TISSUE
Status: DISCONTINUED | OUTPATIENT
Start: 2018-06-07 | End: 2018-06-07

## 2018-06-07 RX ORDER — KETOROLAC TROMETHAMINE 30 MG/ML
30 INJECTION, SOLUTION INTRAMUSCULAR; INTRAVENOUS
Status: COMPLETED | OUTPATIENT
Start: 2018-06-07 | End: 2018-06-07

## 2018-06-07 RX ORDER — ONDANSETRON 4 MG/1
4 TABLET, ORALLY DISINTEGRATING ORAL
Status: COMPLETED | OUTPATIENT
Start: 2018-06-07 | End: 2018-06-07

## 2018-06-07 RX ADMIN — KETOROLAC TROMETHAMINE 30 MG: 30 INJECTION, SOLUTION INTRAMUSCULAR; INTRAVENOUS at 07:06

## 2018-06-07 RX ADMIN — ONDANSETRON 4 MG: 4 TABLET, ORALLY DISINTEGRATING ORAL at 07:06

## 2018-06-07 NOTE — PROGRESS NOTES
"Subjective:       Patient ID: Anthony J Rockweiler is a 63 y.o. male.    Vitals:  height is 5' 8" (1.727 m) and weight is 77.1 kg (170 lb). His temperature is 97.2 °F (36.2 °C). His blood pressure is 162/99 (abnormal) and his pulse is 66. His respiration is 20 and oxygen saturation is 98%.     Chief Complaint: Migraine and Nausea    Migraine    This is a recurrent problem. The problem occurs constantly. The problem has been gradually worsening. The pain is located in the frontal region. The quality of the pain is described as throbbing. Associated symptoms include nausea. Pertinent negatives include no abdominal pain, back pain, blurred vision, fever, sore throat or vomiting. He has tried Excedrin for the symptoms. The treatment provided mild relief. His past medical history is significant for hypertension and migraine headaches.   Nausea   Associated symptoms include headaches and nausea. Pertinent negatives include no abdominal pain, chest pain, chills, fever, rash, sore throat or vomiting.     Review of Systems   Constitution: Negative for chills and fever.   HENT: Negative for sore throat.    Eyes: Negative for blurred vision.   Cardiovascular: Negative for chest pain.   Respiratory: Negative for shortness of breath.    Skin: Negative for rash.   Musculoskeletal: Negative for back pain and joint pain.   Gastrointestinal: Positive for nausea. Negative for abdominal pain, diarrhea and vomiting.   Neurological: Positive for headaches.   Psychiatric/Behavioral: The patient is not nervous/anxious.        Objective:      Physical Exam   Constitutional: He is oriented to person, place, and time. He appears well-developed and well-nourished. He is cooperative.  Non-toxic appearance. He does not appear ill. No distress.   HENT:   Head: Normocephalic and atraumatic.   Right Ear: Hearing, tympanic membrane, external ear and ear canal normal.   Left Ear: Hearing, tympanic membrane, external ear and ear canal normal.   Nose: " Nose normal. No mucosal edema, rhinorrhea or nasal deformity. No epistaxis. Right sinus exhibits no maxillary sinus tenderness and no frontal sinus tenderness. Left sinus exhibits no maxillary sinus tenderness and no frontal sinus tenderness.   Mouth/Throat: Uvula is midline, oropharynx is clear and moist and mucous membranes are normal. No trismus in the jaw. Normal dentition. No uvula swelling. No posterior oropharyngeal erythema.   No temporal TTP   Eyes: Conjunctivae, EOM and lids are normal. Pupils are equal, round, and reactive to light. No scleral icterus.   Sclera clear bilat   Neck: Trachea normal, normal range of motion, full passive range of motion without pain and phonation normal. Neck supple. No neck rigidity.   Cardiovascular: Normal rate, regular rhythm, normal heart sounds, intact distal pulses and normal pulses.    Pulmonary/Chest: Effort normal and breath sounds normal. No respiratory distress.   Abdominal: Soft. Normal appearance and bowel sounds are normal. He exhibits no distension. There is no tenderness.   Musculoskeletal: Normal range of motion. He exhibits no edema or deformity.   Neurological: He is alert and oriented to person, place, and time. No cranial nerve deficit. He exhibits normal muscle tone. Coordination normal.   Skin: Skin is warm, dry and intact. He is not diaphoretic. No pallor.   Psychiatric: He has a normal mood and affect. His speech is normal and behavior is normal. Judgment and thought content normal. Cognition and memory are normal.   Nursing note and vitals reviewed.      Assessment:       1. Migraine aura, persistent, intractable        Plan:         Migraine aura, persistent, intractable  -     Ambulatory referral to Neurology    Other orders  -     ondansetron disintegrating tablet 4 mg; Take 1 tablet (4 mg total) by mouth one time.  -     ketorolac injection 30 mg; Inject 1 mL (30 mg total) into the muscle one time.  -     Discontinue: betamethasone  acetate-betamethasone sodium phosphate injection 9 mg; Inject 1.5 mLs (9 mg total) into the muscle one time.      PT has a long history of neurological problems and needs to see a neurologist.  This gentleman needs a specialist to manage this ongoing problem as he is high risk for an event.  He states she is having a panic attack as well.  Will treat for migraine and refer.      Migraine Headache  This often severe type of headache is different from other types of headaches in that symptoms other than pain occur with the headache. Nausea and vomiting, lightheadedness, sensitivity to light (photophobia), and other visual disturbances are common migraine symptoms. The pain may last from a few hours to several days. It is not clear why migraines occur but certain factors called triggers can raise the risk of having a migraine attack. A migraine may be triggered by emotional stress or depression, or by hormone changes during the menstrual cycle. Other triggers include birth control pills, overuse of migraine medicines, alcohol or caffeine, foods with tyramine (such as aged cheese and wine), eyestrain, weather changes, missed meals, or too little or too much sleep.  Home care  Follow these tips when taking care of yourself at home:  · Dont drive yourself home if you were given pain medicine for your headache or are having visual symptoms. Instead, have someone else drive you home. Try to sleep when you get home. You should feel much better when you wake up.  · Cold can help ease migraine symptoms. Put an ice pack on your forehead or at the base of your skull. Put heat on the back of your neck to help ease any neck spasm.  · Drink only clear liquids or eat a light diet until your symptoms get better. This will help you avoid nausea and vomiting.  How to prevent migraines  Pay attention to what seems to trigger your headache. Try to avoid the triggers when you can. If you have frequent headaches, consider keeping a  headache diary. In it, write down what you were doing, feeling, or eating in the hours before each headache. Show this to your healthcare provider to help find the cause of your headaches.  If stress seems to be a trigger for your headaches, figure out what is causing stress in your life. Learn new ways to handle your stress. Ideas include regular exercise, biofeedback, self-hypnosis, yoga, and meditation. Talk with your healthcare provider to find out more information about managing stress. Many books and digital media are also available on this subject.  Tyramine is a substance found in many foods. It can trigger a migraine in some people. These foods contain tyramine:  · Chocolate  · Yogurt  · All cheeses, but especially aged cheeses  · Smoked or pickled fish and meat, including herring, caviar, bologna, pepperoni, and salami  · Liver  · Avocados  · Bananas  · Figs  · Raisins  · Red wine  Try staying away from these foods for 1 to 2 months to see if you have fewer headaches.  How to treat future headaches  · Take time out at the first sign of a headache, if possible. Find a quiet, dark, comfortable place to sit or lie down. Let yourself relax or sleep.  · Put an ice pack on your forehead or on the area of greatest pain. A heating pad and massage may help if you are having a muscle spasm and tightness in your neck.  · If you have been prescribed a medicine to stop a migraine headache, use this at the first warning sign of the headache for best results. First signs may be an aura or pain.  · If you need to take medicine often for your migraine, talk with your healthcare provider about other ways to prevent your headaches.  Follow-up care  Follow up with your healthcare provider, or as advised. Talk with your provider if you have frequent headaches. He or she can figure out a treatment plan. Ask if you can have medicine to take at home the next time you get a bad headache. This may keep you from having to visit the  emergency department in the future. You may need to see a headache specialist (neurologist) if you continue to have headaches.  When to seek medical advice  Call your healthcare provider right away if any of these occur:  · Your head pain gets worse, or doesnt get better within 24 hours  · You cant keep liquids down (repeated vomiting)  · Pain in your sinuses, ears, or throat  · Fever of 100.4º F (38º C) or higher, or as directed by your healthcare provider  · Stiff neck  · Extreme drowsiness, confusion, or fainting  · Dizziness, or dizziness with spinning sensation (vertigo)  · Weakness in an arm or leg, or on one side of your face  · Difficulty talking or seeing  Date Last Reviewed: 8/1/2016  © 1458-5941 [employer]. [address] [place] [address], [place] [address]. All rights reserved. This information is not intended as a substitute for professional medical care. Always follow your healthcare professional's instructions.        Migraine Headache: Stages and Treatment    A migraine headache tends to progress in stages. Learning these stages can help you better understand what is happening. Then you can learn ways to reduce pain and relieve other symptoms. Methods for relieving your symptoms include self-care and medicines.  Migraine stages  Migraines tend to progress through 4 stages. Many people don't have all stages, and stages may differ with each headache:  · Prodrome. A few hours to a day or so before the headache, you may feel tired, (yawning many times), uneasy, or shabazz. You may also feel bloated or crave certain foods.  · Aura. Up to an hour before the headache starts, some migraine sufferers experience aura--flashing lights, blind spots, other vision problems, confusion, difficulty speaking, or other neurologic symptoms.  · Headache. Moderate to severe pain affects one side of the head and then can spread to both sides, often along with nausea. You may be highly sensitive to light, sound, and  "odors. Vomiting or diarrhea may also happen. This stage lasts 4 to 72 hours.  · Postdrome. After your headache ends, you may feel tired, achy, and "washed out." This may last for a day or so.  Self-care during a migraine  Here is what you can do:  · Use a cold compress. Wrap a thin cloth around a cold pack, a cold can of soda, or a bag of frozen vegetables. Apply this to your temple or other pain site.  · Drink fluids. If nausea makes it hard to drink, try sucking on ice.  · Rest. If possible, lie down. Try not to bend over, as this may increase your pain. Sometimes laying in a dark quiet room can help the migraine from being aggravated.    · Try caffeine. Some people find that drinking fluids with caffeine, such as coffee or tea, helps to lessen migraine pain.  Using medicines  Work with your healthcare provider to find the right medicines for you. Medicines for migraine may relieve pain (analgesics), relieve nausea, or attack the migraine's root causes (migraine-specific medicines).  Rebound headache  Taking analgesics each day, or even several times a week, may lead to more frequent and severe headaches. These are called rebound headaches. If you think you're having rebound headaches, tell your healthcare provider. He or she can help you safely decrease your medicine. Rebound caffeine withdrawal headaches can also happen.    Date Last Reviewed: 10/9/2015  © 0259-6593 [employer]. [address] [place] [address], [place] [address]. All rights reserved. This information is not intended as a substitute for professional medical care. Always follow your healthcare professional's instructions.    Please return here or go to the Emergency Department for any concerns or worsening of condition.  If you were prescribed antibiotics, please take them to completion.  If you were prescribed a narcotic medication, do not drive or operate heavy equipment or machinery while taking these medications.  Please drink plenty of " fluids.  Please get plenty of rest.  If not allergic, please take over the counter Tylenol (Acetaminophen) and/or Motrin (Ibuprofen) as directed for control of pain and/or fever.  Please follow up with your primary care doctor or specialist as needed.    If you  smoke, please stop smoking.

## 2018-06-08 NOTE — PATIENT INSTRUCTIONS
Migraine Headache  This often severe type of headache is different from other types of headaches in that symptoms other than pain occur with the headache. Nausea and vomiting, lightheadedness, sensitivity to light (photophobia), and other visual disturbances are common migraine symptoms. The pain may last from a few hours to several days. It is not clear why migraines occur but certain factors called triggers can raise the risk of having a migraine attack. A migraine may be triggered by emotional stress or depression, or by hormone changes during the menstrual cycle. Other triggers include birth control pills, overuse of migraine medicines, alcohol or caffeine, foods with tyramine (such as aged cheese and wine), eyestrain, weather changes, missed meals, or too little or too much sleep.  Home care  Follow these tips when taking care of yourself at home:  · Dont drive yourself home if you were given pain medicine for your headache or are having visual symptoms. Instead, have someone else drive you home. Try to sleep when you get home. You should feel much better when you wake up.  · Cold can help ease migraine symptoms. Put an ice pack on your forehead or at the base of your skull. Put heat on the back of your neck to help ease any neck spasm.  · Drink only clear liquids or eat a light diet until your symptoms get better. This will help you avoid nausea and vomiting.  How to prevent migraines  Pay attention to what seems to trigger your headache. Try to avoid the triggers when you can. If you have frequent headaches, consider keeping a headache diary. In it, write down what you were doing, feeling, or eating in the hours before each headache. Show this to your healthcare provider to help find the cause of your headaches.  If stress seems to be a trigger for your headaches, figure out what is causing stress in your life. Learn new ways to handle your stress. Ideas include regular exercise, biofeedback,  self-hypnosis, yoga, and meditation. Talk with your healthcare provider to find out more information about managing stress. Many books and digital media are also available on this subject.  Tyramine is a substance found in many foods. It can trigger a migraine in some people. These foods contain tyramine:  · Chocolate  · Yogurt  · All cheeses, but especially aged cheeses  · Smoked or pickled fish and meat, including herring, caviar, bologna, pepperoni, and salami  · Liver  · Avocados  · Bananas  · Figs  · Raisins  · Red wine  Try staying away from these foods for 1 to 2 months to see if you have fewer headaches.  How to treat future headaches  · Take time out at the first sign of a headache, if possible. Find a quiet, dark, comfortable place to sit or lie down. Let yourself relax or sleep.  · Put an ice pack on your forehead or on the area of greatest pain. A heating pad and massage may help if you are having a muscle spasm and tightness in your neck.  · If you have been prescribed a medicine to stop a migraine headache, use this at the first warning sign of the headache for best results. First signs may be an aura or pain.  · If you need to take medicine often for your migraine, talk with your healthcare provider about other ways to prevent your headaches.  Follow-up care  Follow up with your healthcare provider, or as advised. Talk with your provider if you have frequent headaches. He or she can figure out a treatment plan. Ask if you can have medicine to take at home the next time you get a bad headache. This may keep you from having to visit the emergency department in the future. You may need to see a headache specialist (neurologist) if you continue to have headaches.  When to seek medical advice  Call your healthcare provider right away if any of these occur:  · Your head pain gets worse, or doesnt get better within 24 hours  · You cant keep liquids down (repeated vomiting)  · Pain in your sinuses, ears, or  "throat  · Fever of 100.4º F (38º C) or higher, or as directed by your healthcare provider  · Stiff neck  · Extreme drowsiness, confusion, or fainting  · Dizziness, or dizziness with spinning sensation (vertigo)  · Weakness in an arm or leg, or on one side of your face  · Difficulty talking or seeing  Date Last Reviewed: 8/1/2016  © 9601-5188 [employer]. [address] [place] [address], [place] [address]. All rights reserved. This information is not intended as a substitute for professional medical care. Always follow your healthcare professional's instructions.        Migraine Headache: Stages and Treatment    A migraine headache tends to progress in stages. Learning these stages can help you better understand what is happening. Then you can learn ways to reduce pain and relieve other symptoms. Methods for relieving your symptoms include self-care and medicines.  Migraine stages  Migraines tend to progress through 4 stages. Many people don't have all stages, and stages may differ with each headache:  · Prodrome. A few hours to a day or so before the headache, you may feel tired, (yawning many times), uneasy, or shabazz. You may also feel bloated or crave certain foods.  · Aura. Up to an hour before the headache starts, some migraine sufferers experience aura--flashing lights, blind spots, other vision problems, confusion, difficulty speaking, or other neurologic symptoms.  · Headache. Moderate to severe pain affects one side of the head and then can spread to both sides, often along with nausea. You may be highly sensitive to light, sound, and odors. Vomiting or diarrhea may also happen. This stage lasts 4 to 72 hours.  · Postdrome. After your headache ends, you may feel tired, achy, and "washed out." This may last for a day or so.  Self-care during a migraine  Here is what you can do:  · Use a cold compress. Wrap a thin cloth around a cold pack, a cold can of soda, or a bag of frozen vegetables. Apply this to " your temple or other pain site.  · Drink fluids. If nausea makes it hard to drink, try sucking on ice.  · Rest. If possible, lie down. Try not to bend over, as this may increase your pain. Sometimes laying in a dark quiet room can help the migraine from being aggravated.    · Try caffeine. Some people find that drinking fluids with caffeine, such as coffee or tea, helps to lessen migraine pain.  Using medicines  Work with your healthcare provider to find the right medicines for you. Medicines for migraine may relieve pain (analgesics), relieve nausea, or attack the migraine's root causes (migraine-specific medicines).  Rebound headache  Taking analgesics each day, or even several times a week, may lead to more frequent and severe headaches. These are called rebound headaches. If you think you're having rebound headaches, tell your healthcare provider. He or she can help you safely decrease your medicine. Rebound caffeine withdrawal headaches can also happen.    Date Last Reviewed: 10/9/2015  © 6892-0288 "[employer]. [address] [place] [address], [place] [address]. All rights reserved. This information is not intended as a substitute for professional medical care. Always follow your healthcare professional's instructions.    Please return here or go to the Emergency Department for any concerns or worsening of condition.  If you were prescribed antibiotics, please take them to completion.  If you were prescribed a narcotic medication, do not drive or operate heavy equipment or machinery while taking these medications.  Please drink plenty of fluids.  Please get plenty of rest.  If not allergic, please take over the counter Tylenol (Acetaminophen) and/or Motrin (Ibuprofen) as directed for control of pain and/or fever.  Please follow up with your primary care doctor or specialist as needed.    If you  smoke, please stop smoking.

## 2018-07-18 ENCOUNTER — OFFICE VISIT (OUTPATIENT)
Dept: URGENT CARE | Facility: CLINIC | Age: 63
End: 2018-07-18
Payer: MEDICARE

## 2018-07-18 VITALS
WEIGHT: 170 LBS | DIASTOLIC BLOOD PRESSURE: 90 MMHG | SYSTOLIC BLOOD PRESSURE: 146 MMHG | HEIGHT: 68 IN | RESPIRATION RATE: 20 BRPM | OXYGEN SATURATION: 97 % | HEART RATE: 62 BPM | TEMPERATURE: 97 F | BODY MASS INDEX: 25.76 KG/M2

## 2018-07-18 PROCEDURE — 99214 OFFICE O/P EST MOD 30 MIN: CPT | Mod: 25,S$GLB,, | Performed by: NURSE PRACTITIONER

## 2018-07-18 PROCEDURE — 96372 THER/PROPH/DIAG INJ SC/IM: CPT | Mod: S$GLB,,, | Performed by: NURSE PRACTITIONER

## 2018-07-18 RX ORDER — ONDANSETRON 4 MG/1
4 TABLET, ORALLY DISINTEGRATING ORAL
Status: COMPLETED | OUTPATIENT
Start: 2018-07-18 | End: 2018-07-18

## 2018-07-18 RX ORDER — LOSARTAN POTASSIUM 100 MG/1
100 TABLET ORAL EVERY MORNING
COMMUNITY

## 2018-07-18 RX ORDER — KETOROLAC TROMETHAMINE 30 MG/ML
30 INJECTION, SOLUTION INTRAMUSCULAR; INTRAVENOUS ONCE
Status: COMPLETED | OUTPATIENT
Start: 2018-07-18 | End: 2018-07-18

## 2018-07-18 RX ORDER — ONDANSETRON 4 MG/1
4 TABLET, ORALLY DISINTEGRATING ORAL EVERY 6 HOURS PRN
Qty: 12 TABLET | Refills: 0 | Status: SHIPPED | OUTPATIENT
Start: 2018-07-18 | End: 2018-07-21

## 2018-07-18 RX ADMIN — KETOROLAC TROMETHAMINE 30 MG: 30 INJECTION, SOLUTION INTRAMUSCULAR; INTRAVENOUS at 09:07

## 2018-07-18 RX ADMIN — ONDANSETRON 4 MG: 4 TABLET, ORALLY DISINTEGRATING ORAL at 09:07

## 2018-07-18 NOTE — PROGRESS NOTES
"Subjective:       Patient ID: Anthony J Rockweiler is a 63 y.o. male.    Vitals:  height is 5' 8" (1.727 m) and weight is 77.1 kg (170 lb). His temperature is 97 °F (36.1 °C). His blood pressure is 146/90 (abnormal) and his pulse is 62. His respiration is 20 and oxygen saturation is 97%.     Chief Complaint: Headache    Pt presents to clinic requesting toradol inj that he routinely comes in for t o treat his migraines. 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 Pt states he has a neurologist, Dr. Giles who has tried multiple diff medications with no results. Pt states only Excedrin works for his migraines. Pt recently had BP med changed by cardiologist last week, feels he may be having a migraine due to elevated BP. BP lower than previous visit.     Pt upset he is not seeing a Doctor in the , states NP's do not know how to manage pain. Explained to pt that I am not trained in pain management but that I can give him the Toradol inj he has received at his previous 5/6 visits. Pt states compliance with this plan of care.     Pt instructed to f/u with Cards in regards to managing his BP meds.     Pt also reports hx of anxiety, which he states he is experiencing at present time, currently taking 4mg Lorazepam daily for anxiety with moderate relief.         Headache    This is a new problem. The current episode started in the past 7 days. The problem has been gradually worsening. The pain is located in the frontal region. The pain does not radiate. The pain quality is similar to prior headaches. The quality of the pain is described as dull. The pain is at a severity of 10/10. The pain is severe. Pertinent negatives include no blurred vision, dizziness, fever, nausea, neck pain, numbness, photophobia, seizures, tinnitus, vomiting or weakness. Nothing aggravates the symptoms. He has tried Excedrin for the symptoms. The treatment provided no relief. His past medical history is " significant for recent head traumas.     Review of Systems   Constitution: Negative for chills, fever and weakness.   HENT: Negative for congestion and tinnitus.    Eyes: Negative for blurred vision and photophobia.   Skin: Negative for rash.   Musculoskeletal: Negative for neck pain.   Gastrointestinal: Negative for nausea and vomiting.   Neurological: Positive for headaches. Negative for disturbances in coordination, dizziness, numbness and seizures.   Psychiatric/Behavioral: Negative for altered mental status. The patient is not nervous/anxious.    All other systems reviewed and are negative.      Objective:      Physical Exam   Constitutional: He is oriented to person, place, and time. He appears well-developed and well-nourished. He is cooperative.  Non-toxic appearance. He does not appear ill. No distress.   HENT:   Head: Normocephalic and atraumatic.   Right Ear: Hearing, tympanic membrane, external ear and ear canal normal.   Left Ear: Hearing, tympanic membrane, external ear and ear canal normal.   Nose: Nose normal. No mucosal edema, rhinorrhea or nasal deformity. No epistaxis. Right sinus exhibits no maxillary sinus tenderness and no frontal sinus tenderness. Left sinus exhibits no maxillary sinus tenderness and no frontal sinus tenderness.   Mouth/Throat: Uvula is midline, oropharynx is clear and moist and mucous membranes are normal. No trismus in the jaw. Normal dentition. No uvula swelling. No posterior oropharyngeal erythema.   No temporal TTP   Eyes: Conjunctivae, EOM and lids are normal. Pupils are equal, round, and reactive to light. No scleral icterus.   Sclera clear bilat   Neck: Trachea normal, normal range of motion, full passive range of motion without pain and phonation normal. Neck supple. No neck rigidity.   Cardiovascular: Normal rate, regular rhythm, normal heart sounds, intact distal pulses and normal pulses.    Pulmonary/Chest: Effort normal and breath sounds normal. No respiratory  distress.   Abdominal: Soft. Normal appearance and bowel sounds are normal. He exhibits no distension. There is no tenderness.   Musculoskeletal: Normal range of motion. He exhibits no edema or deformity.   Neurological: He is alert and oriented to person, place, and time. He has normal strength. No cranial nerve deficit or sensory deficit. He exhibits normal muscle tone. He displays a negative Romberg sign. Coordination normal. GCS eye subscore is 4. GCS verbal subscore is 5. GCS motor subscore is 6.   Skin: Skin is warm, dry and intact. He is not diaphoretic. No pallor.   Psychiatric: He has a normal mood and affect. His speech is normal and behavior is normal. Judgment and thought content normal. Cognition and memory are normal.   Nursing note and vitals reviewed.      Assessment:       1. Chronic migraine        Plan:     Follow up with neurology and cardiology.     Chronic migraine  -     ketorolac injection 30 mg; Inject 1 mL (30 mg total) into the muscle once.  -     ondansetron disintegrating tablet 4 mg; Take 1 tablet (4 mg total) by mouth one time.  -     ondansetron (ZOFRAN-ODT) 4 MG TbDL; Take 1 tablet (4 mg total) by mouth every 6 (six) hours as needed (nausea vomiting).  Dispense: 12 tablet; Refill: 0

## 2018-07-18 NOTE — PATIENT INSTRUCTIONS
Managing Post-Traumatic Headaches After Traumatic Brain Injury  A traumatic brain injury (TBI) is a sudden jolt to your head that changes the way your brain works. Its not surprising that headache would be the most common physical symptom after a brain injury. Because a jolt to your head also causes a jolt to your neck, headaches with neck pain are the most common types of pain after a TBI.  The type of headache you get does not depend on the severity of your TBI is. Thats because symptoms after a TBI are unpredictable. You could have a mild TBI but still have very painful headaches. Also, having headaches can make your other TBI symptoms worse, and other TBI symptoms can make your headaches worse. Because of this, its important to learn how to manage your headaches.  Types of headaches after a TBI   Your headaches may be mild or severe. They may come and go, or you may have them all the time. TBI symptoms, such as trouble sleeping, anxiety, fatigue, depression, slowed thinking, and memory loss, can all be made worse by headaches.  Here are some common types of TBI headaches:  · Headaches that start with pain in the back of your neck and spread to your head. These headaches get worse as the day goes on. They are more likely if your TBI includes a neck injury. This type of headache is often called a tension headache. It is probably the most common type of TBI headache.  · Migraine headaches. These may be triggered by TBI, especially if you have a family history of migraines. A migraine headache is a pounding headache, usually on one side of your head. Its caused by abnormal blood flow to your brain. Stressful symptoms of a TBI may trigger a migraine attack.  Managing TBI headaches  Strong pain medicines, called opiates or narcotics, are usually not the answer for TBI headaches. These medicines can make other TBI symptoms worse. They also can have unpredictable side effects in a person with a TBI.  If pain  medicines are needed, the first choice is a nonnarcotic medicine. Examples include over-the-counter pain relievers like acetaminophen or ibuprofen. If you use pain relief medicines for a headache more than 3 days a week, you need to watch if your headaches are getting worse. This is called rebound headache.  The best way to manage your headaches is with self-care, also called headache hygiene. Here are some self-care tips:  · Take medicines only as your healthcare provider prescribes them. Dont take any medicines on your own.  · If you start getting a headache, try to find a dark, quiet place where you can lie down.  · Wear dark glasses if bright lights seem to trigger your headaches.  · Avoid any foods and beverages that seem to trigger a headache.  · Learn to avoid stress and relax by using techniques like listening to music, meditating, or deep breathing. If needed, work with a mental health expert to reduce stress and anxiety.  · Get daily exercise. This helps your body and your brain.  · Go to bed and get up at the same time every day.  · Avoid caffeine, alcohol, and tobacco.  · Take part in physical therapy to stretch and strengthen your muscles. Learn how to do these exercises at home.  · Think about trying alternative treatments like acupuncture or massage therapy.  · Keep a headache journal to share with your healthcare provider. Write down every time you get a headache, how severe it is, and what seemed to have triggered it.  TBI headaches usually go away with time. How long it takes your brain to recover depends on the type of injury you had. Managing headache pain with self-care can help you heal faster. Call your healthcare provider if your pain is getting worse. And remember, never try to treat headaches on your own with drugs or alcohol.  Date Last Reviewed: 7/1/2016  © 7234-9840 [employer]. [address] [place] [address], [place] [address]. All rights reserved. This information is not  intended as a substitute for professional medical care. Always follow your healthcare professional's instructions.    Please arrange follow up with your primary medical clinic as soon as possible. You must understand that you've received an Urgent Care treatment only and that you may be released before all of your medical problems are known or treated. You, the patient, will arrange for follow up as instructed. If your symptoms worsen or fail to improve you should go to the Emergency Room.

## 2019-07-10 ENCOUNTER — OFFICE VISIT (OUTPATIENT)
Dept: PSYCHIATRY | Facility: CLINIC | Age: 64
End: 2019-07-10
Payer: MEDICARE

## 2019-07-10 VITALS
HEIGHT: 68 IN | WEIGHT: 187.94 LBS | HEART RATE: 70 BPM | DIASTOLIC BLOOD PRESSURE: 86 MMHG | BODY MASS INDEX: 28.48 KG/M2 | SYSTOLIC BLOOD PRESSURE: 138 MMHG

## 2019-07-10 DIAGNOSIS — S06.9X9S TRAUMATIC BRAIN INJURY WITH LOSS OF CONSCIOUSNESS, SEQUELA: ICD-10-CM

## 2019-07-10 DIAGNOSIS — F41.1 GENERALIZED ANXIETY DISORDER: ICD-10-CM

## 2019-07-10 DIAGNOSIS — G93.9 PERSONALITY AND BEHAVIORAL DISORDERS DUE TO BRAIN DISEASE, DAMAGE, AND DYSFUNCTION: ICD-10-CM

## 2019-07-10 DIAGNOSIS — F07.9 PERSONALITY AND BEHAVIORAL DISORDERS DUE TO BRAIN DISEASE, DAMAGE, AND DYSFUNCTION: ICD-10-CM

## 2019-07-10 DIAGNOSIS — F06.30 MOOD DISORDER DUE TO A GENERAL MEDICAL CONDITION: Primary | ICD-10-CM

## 2019-07-10 PROCEDURE — 99999 PR PBB SHADOW E&M-EST. PATIENT-LVL III: ICD-10-PCS | Mod: PBBFAC,,, | Performed by: PSYCHIATRY & NEUROLOGY

## 2019-07-10 PROCEDURE — 99205 PR OFFICE/OUTPT VISIT, NEW, LEVL V, 60-74 MIN: ICD-10-PCS | Mod: S$PBB,,, | Performed by: PSYCHIATRY & NEUROLOGY

## 2019-07-10 PROCEDURE — 99205 OFFICE O/P NEW HI 60 MIN: CPT | Mod: S$PBB,,, | Performed by: PSYCHIATRY & NEUROLOGY

## 2019-07-10 PROCEDURE — 99999 PR PBB SHADOW E&M-EST. PATIENT-LVL III: CPT | Mod: PBBFAC,,, | Performed by: PSYCHIATRY & NEUROLOGY

## 2019-07-10 PROCEDURE — 99213 OFFICE O/P EST LOW 20 MIN: CPT | Mod: PBBFAC | Performed by: PSYCHIATRY & NEUROLOGY

## 2019-07-10 RX ORDER — MIRTAZAPINE 30 MG/1
30 TABLET, FILM COATED ORAL NIGHTLY
Refills: 3 | COMMUNITY
Start: 2019-06-05 | End: 2020-03-03 | Stop reason: SDUPTHER

## 2019-07-10 RX ORDER — PRAVASTATIN SODIUM 20 MG/1
20 TABLET ORAL DAILY
COMMUNITY
End: 2023-08-10 | Stop reason: SDUPTHER

## 2019-07-10 RX ORDER — LORAZEPAM 1 MG/1
1 TABLET ORAL 3 TIMES DAILY
Qty: 90 TABLET | Refills: 2 | Status: SHIPPED | OUTPATIENT
Start: 2019-07-10 | End: 2019-09-11

## 2019-07-10 RX ORDER — LEVOTHYROXINE SODIUM 125 UG/1
150 TABLET ORAL DAILY
Refills: 2 | COMMUNITY
Start: 2019-06-14 | End: 2021-11-29

## 2019-07-10 RX ORDER — LORAZEPAM 1 MG/1
1 TABLET ORAL 3 TIMES DAILY
Qty: 90 TABLET | Refills: 2 | Status: SHIPPED | OUTPATIENT
Start: 2019-07-10 | End: 2019-07-10 | Stop reason: SDUPTHER

## 2019-07-10 RX ORDER — FERROUS GLUCONATE 324(38)MG
TABLET ORAL
Refills: 2 | COMMUNITY
Start: 2019-05-02 | End: 2019-12-24

## 2019-07-10 RX ORDER — NAPROXEN 500 MG/1
500 TABLET ORAL 2 TIMES DAILY WITH MEALS
Refills: 2 | COMMUNITY
Start: 2019-06-29 | End: 2019-12-24 | Stop reason: ALTCHOICE

## 2019-07-10 RX ORDER — AMLODIPINE BESYLATE 5 MG/1
5 TABLET ORAL 2 TIMES DAILY
Refills: 3 | COMMUNITY
Start: 2019-05-20

## 2019-07-10 NOTE — PATIENT INSTRUCTIONS
[facility] - DEPARTMENT OF PSYCHIATRY   NEW PATIENT ORIENTATION INFORMATION  OUTPATIENT SERVICES COUNSELING CONTRACT    We appreciate the opportunity to participate in your medical care and hope the following protocols will make it easier for you to receive quality treatment in our department.    1. PUNCTUALITY: Your appointment is scheduled for a fixed amount of time reserved especially for you.  To get the benefit of your appointment, please arrive early enough to allow time for parking and registration.  If you are late for your appointment, your clinician is not able to offer additional time.  Please make every effort to be on time.  You may be asked to reschedule.    2. PAYMENT FOR SERVICES:   Payments are expected at the time of service.  Please contact [phone] if you need to resolve issues involving your account at [facility] or to set up a payment plan.    3. CANCELLATION / MISSED APPOINTMENTS:   In order to receive quality care, all appointments must be kept.  Appointment may be cancelled, ONLY by talking with an  at phone number [phone], between 8:00 a.m. and 5:00 p.m., Monday through Friday, at least 24 hours before your appointment time.  Your clinician reserves this time specifically for you, and if you will be unable to use it, it is necessary that you cancel in a timely manner.  If you do not give at least 24-hour notice of cancellation a fee may be assessed.  Please note that insurance does not cover no-show charges, so you will be billed directly.  If you are consistently late, cancel, or do not show for your appointments, our department reserves the right to terminate treatment.    4. CALLING THE DEPARTMENT:   MESSAGES, SCHEDULE OR CANCEL APPOINTMENTS- In general you can reach the department by calling [phone], between 8:00 a.m. and 5:00 p.m., Monday through Friday, to schedule or cancel appointments or leave a message for your clinician.  It is  advisable to schedule your visits far in advance to obtain the most convenient times for your appointments.   AFTER HOURS, WEEKEND OR HOLIDAYS- For urgent questions after hours, weekends and holidays, calling the department number [phone] will connect you to the [facility] On [facility] nursing staff or the Psychiatry Inpatient Unit.  The [facility] On [facility] nursing staff will speak with you and direct your call/care as necessary.   EMERGENCY-  In case of a crisis when there is a concern of harm to self or others, call 911 or the office [phone] between 8:00 a.m. and 5:00 p.m., Monday through Friday.  After hours, weekends or holidays, please call 911 or go to the Emergency Department where you can be thoroughly evaluated by a physician.    5. TEAM APPROACH:  Most patients receive therapy through our team system.  In the team system, your primary therapist will be a , psychologist, psychiatry resident or psychiatrist.  If your therapist is a , psychologist or psychiatry resident, please contact your primary therapist first in matters other than medications or acute medical problems.    6. PRESCRIPTION REFILLS:  Prescription refills must be done at your physician office visit.  You will be given a sufficient number of refills to last one extra month beyond your next appointment.  No additional refills will be approved beyond the original treatment plan.  After hours, sufficient medication may be approved to last until the next scheduled appointment. After hours requests for refills on controlled substances will be declined by the psychiatrist on call, as he or she may not be familiar with your case.  Please work closely with your doctor so that you have sufficient medication until your next appointment.  Again, please note that no additional prescriptions will be approved per patient request over the phone.   No additional refills will be approved beyond the original treatment plan.   In  "certain exceptional situations, a phone consult appointment may be arranged, with appropriate charge, for the review and approval of prescription refills to last until the next scheduled appointment.    7. FOLLOW UP APPOINTMENTS:  Follow-up appointments can be made in person at the [facility] office, or by calling [phone], from 8am to 5pm, between Monday and Friday.  It is advisable to schedule your office visits far enough in advance to obtain the most convenient times for your appointments.    Revised Feb. 23, 2010 - F/OA1/Newpatient                      You have been provided with a certain amount of medication with a specified number of refills.  Please follow up within an adequate time before you run out of medications.    REFILLS FOR CONTROLLED SUBSTANCES WILL NOT BE GIVEN WITHOUT AN APPOINTMENT.  I will not honor or fill automated refill requests from pharmacies.  You must come in for an appointment to get refills.        Please book your next appointment for myself or therapist by phone by calling our office at [phone].        Note that these follow up appointments are 20 minutes long.  It is important that we focus on medication management.  Should you need therapy, please get set up with our therapist or call your insurance company to find out which therapists are available in your area.      PLEASE BE AT LEAST 15 MINUTES EARLY FOR YOUR NEXT APPOINTMENT.  Late arrivals WILL BE TURNED AWAY AND ASKED TO RESCHEDULE.  YOU MUST COME EARLY TO ALLOW TIME FOR CHECK-IN AS WELL AS GET YOUR VITAL SIGNS AND GO OVER YOUR MEDICATIONS.  Tardiness is not fair to the patients who present after you and are on time for their appointments.  It causes a delay in the appointments for patients and staff.  YOU MAY ALSO BE DISCHARGED FROM CLINIC with multiple late arrivals or "No Show" appointments.   "     -----------------------------------------------------------------------------------------------------------------  IF YOU FEEL SUICIDAL OR HAVING THOUGHTS OR PLANS TO HURT YOURSELF OR OTHERS, CALL 911 OR REPORT TO THE NEAREST EMERGENCY ROOM.  YOU CAN ALSO ACCESS THE FOLLOWING HOTLINE:    National Suicide Hotline Number 9-198-591-GDGK (5729)                Every 3 weeks, cut your dose of lorazepam by 0.5mg.  Try to take it 3 times a day.    1mg,  0.5mg,  2mg  1mg,  0.5mg,  1.5mg  1mg,  0.5mg,  1mg  0.5mg,  0.5mg,  1mg  0.5mg,  0.5mg,  0.5mg  0.5mg,  0,   0.5mg  0,   0,   0.5mg  STOP
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
## 2019-07-31 ENCOUNTER — TELEPHONE (OUTPATIENT)
Dept: PSYCHIATRY | Facility: CLINIC | Age: 64
End: 2019-07-31

## 2019-07-31 ENCOUNTER — OFFICE VISIT (OUTPATIENT)
Dept: PSYCHIATRY | Facility: CLINIC | Age: 64
End: 2019-07-31
Payer: MEDICARE

## 2019-07-31 VITALS
DIASTOLIC BLOOD PRESSURE: 76 MMHG | HEART RATE: 74 BPM | BODY MASS INDEX: 28.09 KG/M2 | SYSTOLIC BLOOD PRESSURE: 124 MMHG | HEIGHT: 68 IN | WEIGHT: 185.31 LBS

## 2019-07-31 DIAGNOSIS — S06.9X9S TRAUMATIC BRAIN INJURY WITH LOSS OF CONSCIOUSNESS, SEQUELA: ICD-10-CM

## 2019-07-31 DIAGNOSIS — F06.30 MOOD DISORDER DUE TO A GENERAL MEDICAL CONDITION: Primary | ICD-10-CM

## 2019-07-31 DIAGNOSIS — F07.9 PERSONALITY AND BEHAVIORAL DISORDERS DUE TO BRAIN DISEASE, DAMAGE, AND DYSFUNCTION: ICD-10-CM

## 2019-07-31 DIAGNOSIS — F41.1 GENERALIZED ANXIETY DISORDER: ICD-10-CM

## 2019-07-31 DIAGNOSIS — G93.9 PERSONALITY AND BEHAVIORAL DISORDERS DUE TO BRAIN DISEASE, DAMAGE, AND DYSFUNCTION: ICD-10-CM

## 2019-07-31 PROCEDURE — 99999 PR PBB SHADOW E&M-EST. PATIENT-LVL III: CPT | Mod: PBBFAC,,, | Performed by: PSYCHIATRY & NEUROLOGY

## 2019-07-31 PROCEDURE — 90833 PSYTX W PT W E/M 30 MIN: CPT | Mod: ,,, | Performed by: PSYCHIATRY & NEUROLOGY

## 2019-07-31 PROCEDURE — 99213 OFFICE O/P EST LOW 20 MIN: CPT | Mod: S$PBB,,, | Performed by: PSYCHIATRY & NEUROLOGY

## 2019-07-31 PROCEDURE — 99999 PR PBB SHADOW E&M-EST. PATIENT-LVL III: ICD-10-PCS | Mod: PBBFAC,,, | Performed by: PSYCHIATRY & NEUROLOGY

## 2019-07-31 PROCEDURE — 99213 OFFICE O/P EST LOW 20 MIN: CPT | Mod: PBBFAC | Performed by: PSYCHIATRY & NEUROLOGY

## 2019-07-31 PROCEDURE — 99213 PR OFFICE/OUTPT VISIT, EST, LEVL III, 20-29 MIN: ICD-10-PCS | Mod: S$PBB,,, | Performed by: PSYCHIATRY & NEUROLOGY

## 2019-07-31 PROCEDURE — 90833 PR PSYCHOTHERAPY W/PATIENT W/E&M, 30 MIN (ADD ON): ICD-10-PCS | Mod: ,,, | Performed by: PSYCHIATRY & NEUROLOGY

## 2019-07-31 RX ORDER — TRIAMCINOLONE ACETONIDE 1 MG/G
CREAM TOPICAL 2 TIMES DAILY
COMMUNITY
End: 2019-12-24

## 2019-07-31 NOTE — PROGRESS NOTES
Outpatient Psychiatry Follow-Up Visit (MD/NP)    7/31/2019    Clinical Status of Patient:  Outpatient (Ambulatory)    Chief Complaint:  Anthony J Rockweiler is a 64 y.o. male who presents today for follow-up of depression and anxiety.  Met with patient.      Interval History and Content of Current Session:  Interim Events/Subjective Report/Content of Current Session:  Patient Anthony J Rockweiler presents for follow-up after his initial appointment.  He is noted to be late for his appointment because he got lost in the building for about 10-15 minutes. What is scheduled as a 30 min appointment actually takes 45 min of time due to counseling and education.  His wife has told him to tell me that he keeps repeating stories over and over.  She also wants him to tell me that he keeps mentioning to her about getting a divorce.  She also wants him to mention to me that he is constantly interrupting and paranoid about whom she is texting.  He says that his children still interfere with his life and he feels that he has no way to escape from them.  He admits that he has told his wife to free yourself from me, meaning and divorce.  He does not want to get a divorce but feels that he has no more love in his relationship.  He says that he found her journal a few years ago and was filled with nothing but bad things about him.  In passing, he mentions that he was involved with another woman about 8-10 years ago and he has some guilt about this relationship.  Most of the session is spent on coping skills and therapy.  Told patient that he needs to get set up with a therapist but he does not wish to do so.  Told patient that he needs to do couples counseling but he does not wish to do so.  He is following my recommendations and slowly tapering off of the lorazepam.  He went down accidentally to twice daily and noticed that he was not feeling right in the mid afternoon so he had to take the extra dose.  He has noticed that the  "buzzing in his ears has gotten worse since attempting to get off of this medication.  He is hoping to get off of it completely but yet hoping to maintain sleep.      Psychotherapy:  · Target symptoms: depression, anxiety   · Why chosen therapy is appropriate versus another modality: evidence based practice  · Outcome monitoring methods: self-report, observation  · Therapeutic intervention type: supportive psychotherapy  · Topics discussed/themes: relationships difficulties, stress related to medical comorbidities, building skills sets for symptom management, symptom recognition  · The patient's response to the intervention is accepting. The patient's progress toward treatment goals is limited.   · Duration of intervention: 25 minutes.    Review of Systems   · PSYCHIATRIC: Pertinant items are noted in the narrative.  · CONSTITUTIONAL: No weight gain or loss.   · MUSCULOSKELETAL: No pain or stiffness of the joints.  · NEUROLOGIC: No weakness, sensory changes, seizures, confusion, memory loss, tremor or other abnormal movements.  · RESPIRATORY: No shortness of breath.  · CARDIOVASCULAR: No tachycardia or chest pain.  · GASTROINTESTINAL: No nausea, vomiting, pain, constipation or diarrhea.    Past Medical, Family and Social History: The patient's past medical, family and social history have been reviewed and updated as appropriate within the electronic medical record - see encounter notes.    Compliance: yes    Side effects: None    Risk Parameters:  Patient reports no suicidal ideation  Patient reports no homicidal ideation  Patient reports no self-injurious behavior  Patient reports no violent behavior    Exam (detailed: at least 9 elements; comprehensive: all 15 elements)   Constitutional  Vitals:  Most recent vital signs, dated less than 90 days prior to this appointment, were reviewed.   Vitals:    07/31/19 0738   BP: 124/76   Pulse: 74   Weight: 84.1 kg (185 lb 4.8 oz)   Height: 5' 8" (1.727 m)        General:  " unremarkable, age appropriate     Musculoskeletal  Muscle Strength/Tone:  no tremor, no tic   Gait & Station:  non-ataxic     Psychiatric  Speech:  no latency; no press   Mood & Affect:  anxious, dysthymic  congruent and appropriate   Thought Process:  normal and logical   Associations:  intact   Thought Content:  normal, no suicidality, no homicidality, delusions, or paranoia   Insight:  limited awareness of illness   Judgement: limited   Orientation:  person, place, situation, time/date   Memory: intact for content of interview   Language: able to name, able to repeat   Attention Span & Concentration:  distracted   Fund of Knowledge:  intact and appropriate to age and level of education     Assessment and Diagnosis   Status/Progress: Based on the examination today, the patient's problem(s) is/are adequately but not ideally controlled.  New problems have been presented today.   Co-morbidities are complicating management of the primary condition.  There are no active rule-out diagnoses for this patient at this time.     General Impression: We will continue pharmacological intervention and adjunctive therapy.       ICD-10-CM ICD-9-CM   1. Mood disorder due to a general medical condition F06.30 293.83   2. Traumatic brain injury with loss of consciousness, sequela S06.9X9S 907.0   3. Generalized anxiety disorder F41.1 300.02   4. 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 The risks and benefits of medication were discussed with the patient.  · Counseling provided with patient as follows: importance of compliance with chosen treatment options was emphasized, risks and benefits of treatment options, including medications, were discussed with the patient, risk factor reduction, prognosis, patient education, instructions for  management, treatment and  follow-up were reviewed  1.  Continue mirtazapine 30 mg daily targeting sleep, depression, anxiety.  Warned of risk of indio, suicidality, serotonin syndrome, over-sedation, weight gain.  2.  Continue Depakote as per neurologist to 250 mg in the morning and 500 mg nightly targeting seizures.  This is also an help with his mood stabilization.  Will need to follow Depakote levels in the future.  3.  Educated patient about lorazepam taper.  He has been on this medication for many years and his dose has increased.  He is having breakthrough withdrawal and anxiety.  He needs to taper off as this is likely disinhibiting his behaviors.  He is also having some memory changes likely due to long-term use of this medication.  He was given lorazepam 1 mg p.o. t.i.d. and provided with a taper that will take approximately 6 months.  Warned of risk of oversedation, falls, and not to drink or drive until the effects of the medication are known.  Warned of risk of addictive and withdrawal potential.  Warned patient to keep medications in pill bottle and not to carry loosely.  4.  Educated patient that he needs intensive individual therapy as well as couples therapy but he is not interested in either at this time.  5.  Will need to watch patient because he has addictive and manipulative behaviors.  He has potential for impulsive actions and drug misuse because of the traumatic brain injury.  6.  May consider augmentation with lamotrigine are topiramate in the future to help as headache prevention medicine and mood stabilizer.  7.  Educated patient that his heavy use of Excedrin is likely causing rebound headaches but he disagrees.  8.  This will be a difficult case.      Initial taper started 7/10/19:  Every 3 weeks, cut your dose of lorazepam by 0.5mg. Try to take it 3 times a  day.  1mg, 0.5mg, 2mg  1mg, 0.5mg, 1.5mg  1mg, 0.5mg, 1mg  0.5mg, 0.5mg, 1mg  0.5mg, 0.5mg, 0.5mg  0.5mg, 0, 0.5mg  0, 0, 0.5mg  STOP    Return to Clinic: 2  months, as needed

## 2019-07-31 NOTE — PATIENT INSTRUCTIONS
"        You have been provided with a certain amount of medication with a specified number of refills.  Please follow up within an adequate time before you run out of medications.    REFILLS FOR CONTROLLED SUBSTANCES WILL NOT BE GIVEN WITHOUT AN APPOINTMENT.  I will not honor or fill automated refill requests from pharmacies.  You must come in for an appointment to get refills.        Please book your next appointment for myself or therapist by phone by calling our office at [phone].        Note that these follow up appointments are 20 minutes long.  It is important that we focus on medication management.  Should you need therapy, please get set up with our therapist or call your insurance company to find out which therapists are available in your area.      PLEASE BE AT LEAST 15 MINUTES EARLY FOR YOUR NEXT APPOINTMENT.  Late arrivals WILL BE TURNED AWAY AND ASKED TO RESCHEDULE.  YOU MUST COME EARLY TO ALLOW TIME FOR CHECK-IN AS WELL AS GET YOUR VITAL SIGNS AND GO OVER YOUR MEDICATIONS.  Tardiness is not fair to the patients who present after you and are on time for their appointments.  It causes a delay in the appointments for patients and staff.  YOU MAY ALSO BE DISCHARGED FROM CLINIC with multiple late arrivals or "No Show" appointments.       -----------------------------------------------------------------------------------------------------------------  IF YOU FEEL SUICIDAL OR HAVING THOUGHTS OR PLANS TO HURT YOURSELF OR OTHERS, CALL 911 OR REPORT TO THE NEAREST EMERGENCY ROOM.  YOU CAN ALSO ACCESS THE FOLLOWING HOTLINE:    National Suicide Hotline Number 8-425-372-CNAM (0193)                    Every 3 weeks, cut your dose of lorazepam by 0.5mg. Try to take it 3 times a  day.  1mg, 0.5mg, 2mg  1mg, 0.5mg, 1.5mg  1mg, 0.5mg, 1mg  0.5mg, 0.5mg, 1mg  0.5mg, 0.5mg, 0.5mg  0.5mg, 0, 0.5mg  0, 0, 0.5mg  STOP  "

## 2019-08-02 ENCOUNTER — TELEPHONE (OUTPATIENT)
Dept: PSYCHIATRY | Facility: HOSPITAL | Age: 64
End: 2019-08-02

## 2019-08-02 NOTE — LETTER
August 2, 2019    Anthony J Rockweiler  [address] [place] [address]                [address] [place] [address]  [place] [address]  Phone: [phone]  Fax: [phone]   Mr. Rockweiler,    I have found a therapist on the [facility] that does couple's therapy.  Her name is Lisset Sutherland and contact is [phone].  She is located at [address] [place] [id number].    If you and wife are willing, it may be a good idea to reach out to Mrs. Sutherland for an evaluation.    Also, please get set up with [email or web address] as this will give you direct access to send me messages to which I can respond.  This is a direct contact system for your [facility] providers.                Jason Selby M.D., [facility]  Section Head, Psychiatry,[facility]  Senior Lecturer, [employer]

## 2019-08-02 NOTE — TELEPHONE ENCOUNTER
----- Message from Jason Selby MD sent at 8/1/2019  5:00 PM CDT -----  Contact: Patient  Will ask therapist for referrals.      ----- Message -----  From: Brianda Johnson MA  Sent: 8/1/2019   3:44 PM  To: Jason Selby MD    Patient ask if you could please do referral to marriage counseling.

## 2019-09-11 ENCOUNTER — OFFICE VISIT (OUTPATIENT)
Dept: PSYCHIATRY | Facility: CLINIC | Age: 64
End: 2019-09-11
Payer: MEDICARE

## 2019-09-11 VITALS
HEART RATE: 68 BPM | WEIGHT: 185.5 LBS | SYSTOLIC BLOOD PRESSURE: 132 MMHG | DIASTOLIC BLOOD PRESSURE: 86 MMHG | HEIGHT: 68 IN | BODY MASS INDEX: 28.11 KG/M2

## 2019-09-11 DIAGNOSIS — F41.1 GENERALIZED ANXIETY DISORDER: ICD-10-CM

## 2019-09-11 DIAGNOSIS — S06.9X9S TRAUMATIC BRAIN INJURY WITH LOSS OF CONSCIOUSNESS, SEQUELA: ICD-10-CM

## 2019-09-11 DIAGNOSIS — G93.9 PERSONALITY AND BEHAVIORAL DISORDERS DUE TO BRAIN DISEASE, DAMAGE, AND DYSFUNCTION: ICD-10-CM

## 2019-09-11 DIAGNOSIS — F06.30 MOOD DISORDER DUE TO A GENERAL MEDICAL CONDITION: Primary | ICD-10-CM

## 2019-09-11 DIAGNOSIS — F07.9 PERSONALITY AND BEHAVIORAL DISORDERS DUE TO BRAIN DISEASE, DAMAGE, AND DYSFUNCTION: ICD-10-CM

## 2019-09-11 PROCEDURE — 90833 PSYTX W PT W E/M 30 MIN: CPT | Mod: ,,, | Performed by: PSYCHIATRY & NEUROLOGY

## 2019-09-11 PROCEDURE — 99999 PR PBB SHADOW E&M-EST. PATIENT-LVL III: CPT | Mod: PBBFAC,,, | Performed by: PSYCHIATRY & NEUROLOGY

## 2019-09-11 PROCEDURE — 90833 PR PSYCHOTHERAPY W/PATIENT W/E&M, 30 MIN (ADD ON): ICD-10-PCS | Mod: ,,, | Performed by: PSYCHIATRY & NEUROLOGY

## 2019-09-11 PROCEDURE — 99214 PR OFFICE/OUTPT VISIT, EST, LEVL IV, 30-39 MIN: ICD-10-PCS | Mod: S$PBB,,, | Performed by: PSYCHIATRY & NEUROLOGY

## 2019-09-11 PROCEDURE — 99999 PR PBB SHADOW E&M-EST. PATIENT-LVL III: ICD-10-PCS | Mod: PBBFAC,,, | Performed by: PSYCHIATRY & NEUROLOGY

## 2019-09-11 PROCEDURE — 99213 OFFICE O/P EST LOW 20 MIN: CPT | Mod: PBBFAC | Performed by: PSYCHIATRY & NEUROLOGY

## 2019-09-11 PROCEDURE — 99214 OFFICE O/P EST MOD 30 MIN: CPT | Mod: S$PBB,,, | Performed by: PSYCHIATRY & NEUROLOGY

## 2019-09-11 RX ORDER — LORAZEPAM 1 MG/1
1 TABLET ORAL 2 TIMES DAILY
Qty: 60 TABLET | Refills: 1 | Status: SHIPPED | OUTPATIENT
Start: 2019-09-11 | End: 2019-10-22 | Stop reason: SDUPTHER
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
## 2019-09-11 NOTE — PATIENT INSTRUCTIONS
"        You have been provided with a certain amount of medication with a specified number of refills.  Please follow up within an adequate time before you run out of medications.    REFILLS FOR CONTROLLED SUBSTANCES WILL NOT BE GIVEN WITHOUT AN APPOINTMENT.  I will not honor or fill automated refill requests from pharmacies.  You must come in for an appointment to get refills.        Please book your next appointment for myself or therapist by phone by calling our office at [phone].        Note that these follow up appointments are 20 minutes long.  It is important that we focus on medication management.  Should you need therapy, please get set up with our therapist or call your insurance company to find out which therapists are available in your area.      PLEASE BE AT LEAST 15 MINUTES EARLY FOR YOUR NEXT APPOINTMENT.  Late arrivals WILL BE TURNED AWAY AND ASKED TO RESCHEDULE.  YOU MUST COME EARLY TO ALLOW TIME FOR CHECK-IN AS WELL AS GET YOUR VITAL SIGNS AND GO OVER YOUR MEDICATIONS.  [facility] is not fair to the patients who present after you and are on time for their appointments.  It causes a delay in the appointments for patients and staff.  YOU MAY ALSO BE DISCHARGED FROM CLINIC with multiple late arrivals or "No Show" appointments.       -----------------------------------------------------------------------------------------------------------------  IF YOU FEEL SUICIDAL OR HAVING THOUGHTS OR PLANS TO HURT YOURSELF OR OTHERS, CALL 911 OR REPORT TO THE NEAREST EMERGENCY ROOM.  YOU CAN ALSO ACCESS THE FOLLOWING HOTLINE:    National Suicide Hotline Number 5-263-313-TALK (0682)              Initial taper started 7/10/19:    Every 3 weeks, cut your dose of lorazepam by 0.5mg. Try to take it 3 times a  day.    1mg, 0.5mg, 2mg  1mg, 0.5mg, 1.5mg  1mg, 0.5mg, 1mg  0.5mg, 0.5mg, 1mg  0.5mg, 0.5mg, 0.5mg  0.5mg, 0, 0.5mg  0, 0, 0.5mg  STOP          Look up Cymbalta.  We can start a low 20mg dose.  "

## 2019-10-10 ENCOUNTER — TELEPHONE (OUTPATIENT)
Dept: PSYCHIATRY | Facility: HOSPITAL | Age: 64
End: 2019-10-10

## 2019-10-10 NOTE — TELEPHONE ENCOUNTER
----- Message from Jason Selby MD sent at 10/8/2019  4:15 PM CDT -----  Contact: Yelena from [facility] office  Called and told them to fax a medical records release.      ----- Message -----  From: Brianda Johnson MA  Sent: 10/8/2019  12:46 PM CDT  To: MD Yelena Finley called from [facility] office to ask to speak with you about this patient and process for records. [phone].

## 2019-10-22 ENCOUNTER — OFFICE VISIT (OUTPATIENT)
Dept: PSYCHIATRY | Facility: CLINIC | Age: 64
End: 2019-10-22
Payer: MEDICARE

## 2019-10-22 VITALS
HEIGHT: 68 IN | BODY MASS INDEX: 27.76 KG/M2 | SYSTOLIC BLOOD PRESSURE: 128 MMHG | HEART RATE: 70 BPM | WEIGHT: 183.19 LBS | DIASTOLIC BLOOD PRESSURE: 68 MMHG

## 2019-10-22 DIAGNOSIS — G93.9 PERSONALITY AND BEHAVIORAL DISORDERS DUE TO BRAIN DISEASE, DAMAGE, AND DYSFUNCTION: ICD-10-CM

## 2019-10-22 DIAGNOSIS — F07.9 PERSONALITY AND BEHAVIORAL DISORDERS DUE TO BRAIN DISEASE, DAMAGE, AND DYSFUNCTION: ICD-10-CM

## 2019-10-22 DIAGNOSIS — F06.30 MOOD DISORDER DUE TO A GENERAL MEDICAL CONDITION: Primary | ICD-10-CM

## 2019-10-22 DIAGNOSIS — S06.9X9S TRAUMATIC BRAIN INJURY WITH LOSS OF CONSCIOUSNESS, SEQUELA: ICD-10-CM

## 2019-10-22 DIAGNOSIS — F41.1 GENERALIZED ANXIETY DISORDER: ICD-10-CM

## 2019-10-22 PROCEDURE — 90833 PSYTX W PT W E/M 30 MIN: CPT | Mod: ,,, | Performed by: PSYCHIATRY & NEUROLOGY

## 2019-10-22 PROCEDURE — 99214 OFFICE O/P EST MOD 30 MIN: CPT | Mod: S$PBB,,, | Performed by: PSYCHIATRY & NEUROLOGY

## 2019-10-22 PROCEDURE — 99213 OFFICE O/P EST LOW 20 MIN: CPT | Mod: PBBFAC | Performed by: PSYCHIATRY & NEUROLOGY

## 2019-10-22 PROCEDURE — 99214 PR OFFICE/OUTPT VISIT, EST, LEVL IV, 30-39 MIN: ICD-10-PCS | Mod: S$PBB,,, | Performed by: PSYCHIATRY & NEUROLOGY

## 2019-10-22 PROCEDURE — 90833 PR PSYCHOTHERAPY W/PATIENT W/E&M, 30 MIN (ADD ON): ICD-10-PCS | Mod: ,,, | Performed by: PSYCHIATRY & NEUROLOGY

## 2019-10-22 PROCEDURE — 99999 PR PBB SHADOW E&M-EST. PATIENT-LVL III: CPT | Mod: PBBFAC,,, | Performed by: PSYCHIATRY & NEUROLOGY

## 2019-10-22 PROCEDURE — 99999 PR PBB SHADOW E&M-EST. PATIENT-LVL III: ICD-10-PCS | Mod: PBBFAC,,, | Performed by: PSYCHIATRY & NEUROLOGY

## 2019-10-22 RX ORDER — LORAZEPAM 1 MG/1
1 TABLET ORAL 2 TIMES DAILY
Qty: 60 TABLET | Refills: 1 | Status: SHIPPED | OUTPATIENT
Start: 2019-10-22 | End: 2019-12-24

## 2019-10-22 RX ORDER — TESTOSTERONE CYPIONATE 200 MG/ML
INJECTION, SOLUTION INTRAMUSCULAR
Refills: 5 | COMMUNITY
Start: 2019-10-09 | End: 2023-05-12

## 2019-10-22 RX ORDER — DULOXETIN HYDROCHLORIDE 20 MG/1
20 CAPSULE, DELAYED RELEASE ORAL DAILY
Qty: 30 CAPSULE | Refills: 2 | Status: SHIPPED | OUTPATIENT
Start: 2019-10-22 | End: 2019-12-24

## 2019-10-22 NOTE — PATIENT INSTRUCTIONS
"        You have been provided with a certain amount of medication with a specified number of refills.  Please follow up within an adequate time before you run out of medications.    REFILLS FOR CONTROLLED SUBSTANCES WILL NOT BE GIVEN WITHOUT AN APPOINTMENT.  I will not honor or fill automated refill requests from pharmacies.  You must come in for an appointment to get refills.        Please book your next appointment for myself or therapist by phone by calling our office at [phone].        Note that these follow up appointments are 20 minutes long.  It is important that we focus on medication management.  Should you need therapy, please get set up with our therapist or call your insurance company to find out which therapists are available in your area.      PLEASE BE AT LEAST 15 MINUTES EARLY FOR YOUR NEXT APPOINTMENT.  Late arrivals WILL BE TURNED AWAY AND ASKED TO RESCHEDULE.  YOU MUST COME EARLY TO ALLOW TIME FOR CHECK-IN AS WELL AS GET YOUR VITAL SIGNS AND GO OVER YOUR MEDICATIONS.  [facility] is not fair to the patients who present after you and are on time for their appointments.  It causes a delay in the appointments for patients and staff.  YOU MAY ALSO BE DISCHARGED FROM CLINIC with multiple late arrivals or "No Show" appointments.       -----------------------------------------------------------------------------------------------------------------  IF YOU FEEL SUICIDAL OR HAVING THOUGHTS OR PLANS TO HURT YOURSELF OR OTHERS, CALL 911 OR REPORT TO THE NEAREST EMERGENCY ROOM.  YOU CAN ALSO ACCESS THE FOLLOWING HOTLINE:    National Suicide Hotline Number 6-918-903-TALK (3416)                  "
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
## 2019-11-29 ENCOUNTER — OFFICE VISIT (OUTPATIENT)
Dept: URGENT CARE | Facility: CLINIC | Age: 64
End: 2019-11-29
Payer: MEDICARE

## 2019-12-09 ENCOUNTER — TELEPHONE (OUTPATIENT)
Dept: PSYCHIATRY | Facility: HOSPITAL | Age: 64
End: 2019-12-09

## 2019-12-09 NOTE — TELEPHONE ENCOUNTER
----- Message from Yari Leal sent at 12/9/2019 10:04 AM CST -----  Contact: Patient   Patient called and had a few questions for Dr. Selby. He asked for a call back.     [phone]

## 2019-12-10 ENCOUNTER — TELEPHONE (OUTPATIENT)
Dept: PSYCHIATRY | Facility: HOSPITAL | Age: 64
End: 2019-12-10

## 2019-12-10 ENCOUNTER — TELEPHONE (OUTPATIENT)
Dept: PSYCHIATRY | Facility: CLINIC | Age: 64
End: 2019-12-10

## 2019-12-10 NOTE — TELEPHONE ENCOUNTER
----- Message from Brianda Johnson MA sent at 12/10/2019  2:37 PM CST -----  I did attempt to call patient x2 today,  but he did not answer and voicemail is full. I will continue throughout this week to try and reach him.

## 2019-12-10 NOTE — TELEPHONE ENCOUNTER
Attempted to call patient back again and again cannot leave message because voicemail box is full.

## 2019-12-24 ENCOUNTER — OFFICE VISIT (OUTPATIENT)
Dept: PSYCHIATRY | Facility: CLINIC | Age: 64
End: 2019-12-24
Payer: MEDICARE

## 2019-12-24 VITALS
BODY MASS INDEX: 29.07 KG/M2 | HEIGHT: 68 IN | SYSTOLIC BLOOD PRESSURE: 110 MMHG | WEIGHT: 191.81 LBS | DIASTOLIC BLOOD PRESSURE: 66 MMHG | HEART RATE: 74 BPM

## 2019-12-24 DIAGNOSIS — G93.9 PERSONALITY AND BEHAVIORAL DISORDERS DUE TO BRAIN DISEASE, DAMAGE, AND DYSFUNCTION: ICD-10-CM

## 2019-12-24 DIAGNOSIS — F06.30 MOOD DISORDER DUE TO A GENERAL MEDICAL CONDITION: Primary | ICD-10-CM

## 2019-12-24 DIAGNOSIS — S06.9X9S TRAUMATIC BRAIN INJURY WITH LOSS OF CONSCIOUSNESS, SEQUELA: ICD-10-CM

## 2019-12-24 DIAGNOSIS — F41.1 GENERALIZED ANXIETY DISORDER: ICD-10-CM

## 2019-12-24 DIAGNOSIS — F07.9 PERSONALITY AND BEHAVIORAL DISORDERS DUE TO BRAIN DISEASE, DAMAGE, AND DYSFUNCTION: ICD-10-CM

## 2019-12-24 PROCEDURE — 90833 PR PSYCHOTHERAPY W/PATIENT W/E&M, 30 MIN (ADD ON): ICD-10-PCS | Mod: ,,, | Performed by: PSYCHIATRY & NEUROLOGY

## 2019-12-24 PROCEDURE — 99213 OFFICE O/P EST LOW 20 MIN: CPT | Mod: PBBFAC | Performed by: PSYCHIATRY & NEUROLOGY

## 2019-12-24 PROCEDURE — 99214 OFFICE O/P EST MOD 30 MIN: CPT | Mod: S$PBB,,, | Performed by: PSYCHIATRY & NEUROLOGY

## 2019-12-24 PROCEDURE — 90833 PSYTX W PT W E/M 30 MIN: CPT | Mod: ,,, | Performed by: PSYCHIATRY & NEUROLOGY

## 2019-12-24 PROCEDURE — 99999 PR PBB SHADOW E&M-EST. PATIENT-LVL III: CPT | Mod: PBBFAC,,, | Performed by: PSYCHIATRY & NEUROLOGY

## 2019-12-24 PROCEDURE — 99214 PR OFFICE/OUTPT VISIT, EST, LEVL IV, 30-39 MIN: ICD-10-PCS | Mod: S$PBB,,, | Performed by: PSYCHIATRY & NEUROLOGY

## 2019-12-24 PROCEDURE — 99999 PR PBB SHADOW E&M-EST. PATIENT-LVL III: ICD-10-PCS | Mod: PBBFAC,,, | Performed by: PSYCHIATRY & NEUROLOGY

## 2019-12-24 RX ORDER — IBUPROFEN 800 MG/1
TABLET ORAL
Refills: 2 | COMMUNITY
Start: 2019-11-20 | End: 2022-04-29 | Stop reason: SDUPTHER

## 2019-12-24 RX ORDER — LORAZEPAM 0.5 MG/1
0.5 TABLET ORAL 2 TIMES DAILY
Qty: 90 TABLET | Refills: 2 | Status: SHIPPED | OUTPATIENT
Start: 2019-12-24 | End: 2019-12-24 | Stop reason: SDUPTHER

## 2019-12-24 RX ORDER — LORAZEPAM 0.5 MG/1
0.5 TABLET ORAL 3 TIMES DAILY
Qty: 90 TABLET | Refills: 2 | Status: SHIPPED | OUTPATIENT
Start: 2019-12-24 | End: 2020-03-03

## 2019-12-24 RX ORDER — DULOXETIN HYDROCHLORIDE 30 MG/1
30 CAPSULE, DELAYED RELEASE ORAL DAILY
Qty: 90 CAPSULE | Refills: 0 | Status: SHIPPED | OUTPATIENT
Start: 2019-12-24 | End: 2020-03-03

## 2019-12-24 NOTE — PROGRESS NOTES
Outpatient Psychiatry Follow-Up Visit (MD/NP)    12/24/2019    Clinical Status of Patient:  Outpatient (Ambulatory)    Chief Complaint:  Anthony Joseph Rockweiler is a 64 y.o. male who presents today for follow-up of depression and anxiety.  Met with patient.      Interval History and Content of Current Session:  Interim Events/Subjective Report/Content of Current Session:  Patient Anthony Joseph Rockweiler presents for follow-up after his initial appointment.   What is scheduled as a 30 min appointment actually takes 45 min of time due to counseling and education.  Again with many long winded and tangential stories and is difficult to interrupt.  Still very grandiose in his thinking.  Says that people approach him and think that he is a model because of how good he looks, especially when he wears his dress shirts.  He says that people help him all the time just because he looks like he is a professional person.  They give him special deals that nobody else would give him.  He says that he has been waking at 2:00 a.m. in the morning with shakes but this goes away after while.  Has not gone below 2 mg of lorazepam a day because he is not sure if he can do this.  Tells me the story about how he stop the medicine all of a sudden a few months back and wound up in the hospital.  Again grandiose and saying that the hospital is riding case reports that he is the worse thing they have ever seen.  He also says that when changing his clothes in the hospital, a nurse saw him without his shirt on and did not believe that he was 62 years old.  Testosterone puts him in a deeper sleep but does not give him more sleep.  He is still micro managing and adjusting his thyroid medicine.  He recently fired 1 doctor because the doctor would not listen to his advice on the thyroid medicine.  Still has problems with his wife but has not sought a therapist.  Still gets a lot of his socialization through Facebook were people think that he is  "great and brilliant.  Says that he is tolerating Cymbalta without any negative affects.  Very hesitant to change his lorazepam dose any further.    Psychotherapy:  · Target symptoms: depression, anxiety   · Why chosen therapy is appropriate versus another modality: evidence based practice  · Outcome monitoring methods: self-report, observation  · Therapeutic intervention type: supportive psychotherapy  · Topics discussed/themes: relationships difficulties, stress related to medical comorbidities, building skills sets for symptom management, symptom recognition  · The patient's response to the intervention is accepting. The patient's progress toward treatment goals is limited.   · Duration of intervention: 25 minutes.    Review of Systems   · PSYCHIATRIC: Pertinant items are noted in the narrative.  · CONSTITUTIONAL: No weight gain or loss.   · MUSCULOSKELETAL: Positive for pain.  · NEUROLOGIC: No weakness, sensory changes, seizures, confusion, memory loss, tremor or other abnormal movements.  · RESPIRATORY: No shortness of breath.  · CARDIOVASCULAR: No tachycardia or chest pain.  · GASTROINTESTINAL: No nausea, vomiting, pain, constipation or diarrhea.   · Tinnitus    Past Medical, Family and Social History: The patient's past medical, family and social history have been reviewed and updated as appropriate within the electronic medical record - see encounter notes.    Compliance: yes    Side effects: None    Risk Parameters:  Patient reports no suicidal ideation  Patient reports no homicidal ideation  Patient reports no self-injurious behavior  Patient reports no violent behavior    Exam (detailed: at least 9 elements; comprehensive: all 15 elements)   Constitutional  Vitals:  Most recent vital signs, dated less than 90 days prior to this appointment, were reviewed.   Vitals:    12/24/19 0856   BP: 110/66   Pulse: 74   Weight: 87 kg (191 lb 12.8 oz)   Height: 5' 8" (1.727 m)        General:  unremarkable, age " appropriate     Musculoskeletal  Muscle Strength/Tone:  no tremor, no tic   Gait & Station:  non-ataxic     Psychiatric  Speech:  no latency; no press   Mood & Affect:  anxious, dysthymic  expansive   Thought Process:  perseverative   Associations:  tangential   Thought Content:  normal, no suicidality, no homicidality, delusions, or paranoia   Insight:  limited awareness of illness   Judgement: limited   Orientation:  person, place, situation, time/date   Memory: intact for content of interview   Language: able to name, able to repeat   Attention Span & Concentration:  distracted   Fund of Knowledge:  intact and appropriate to age and level of education     Assessment and Diagnosis   Status/Progress: Based on the examination today, the patient's problem(s) is/are adequately but not ideally controlled.  New problems have been presented today.   Co-morbidities are complicating management of the primary condition.  There are no active rule-out diagnoses for this patient at this time.     General Impression: We will continue pharmacological intervention and adjunctive therapy.       ICD-10-CM ICD-9-CM   1. Mood disorder due to a general medical condition F06.30 293.83   2. Traumatic brain injury with loss of consciousness, sequela S06.9X9S 907.0   3. Generalized anxiety disorder F41.1 300.02   4. 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 The risks and benefits of medication were discussed with the patient.  · Counseling provided with patient as follows: importance of compliance with chosen treatment options was emphasized, risks and benefits of treatment options, including medications, were discussed with the patient, risk factor reduction, prognosis, patient education, instructions for  management, treatment and follow-up were reviewed  1.  Continue  mirtazapine 30 mg daily targeting sleep, depression, anxiety.  Warned of risk of indio, suicidality, serotonin syndrome, over-sedation, weight gain.  2.  Continue Depakote as per neurologist to 250 mg in the morning and 500 mg nightly targeting seizures.  This is also an help with his mood stabilization.  Will need to follow Depakote levels in the future.  3.  Educated patient about lorazepam taper.  He has been on this medication for many years and his dose has increased.  He is having breakthrough withdrawal and anxiety.  He needs to taper off as this is likely disinhibiting his behaviors.  He is also having some memory changes likely due to long-term use of this medication.  He was given lorazepam 0.5 mg 3 times daily and provided with a taper that will take approximately a few months.  Warned of risk of oversedation, falls, and not to drink or drive until the effects of the medication are known.  Warned of risk of addictive and withdrawal potential.  Warned patient to keep medications in pill bottle and not to carry loosely.  4.  Educated patient that he needs intensive individual therapy as well as couples therapy but he is not interested in either at this time.  5.  Will need to watch patient because he has addictive and manipulative behaviors.  He has potential for impulsive actions and drug misuse because of the traumatic brain injury.  6.  May consider augmentation with lamotrigine are topiramate in the future to help as headache prevention medicine and mood stabilizer.  7.  Educated patient that his heavy use of Excedrin and ibuprofen is likely causing rebound headaches and tinnitus but he disagrees.    8.  This will be a difficult case.    9.  Increase Cymbalta to 30 mg daily targeting depression, anxiety, chronic pains.  Warned of risk of indio, suicidality, serotonin syndrome.  10.  A lot of attention seeking in grandiose characteristics which is highly indicative of a personality disorder, likely due  to the head injury.  This makes him a very difficult person to deal with on a personal level.  He needs intense therapy better understanding, insight, and guidance.    Initial taper started 7/10/19:  Every 3 weeks, cut your dose of lorazepam by 0.5mg. Try to take it 3 times a  day.     1mg, 0.5mg, 1mg  0.5mg, 0.5mg, 1mg  0.5mg, 0.5mg, 0.5mg  0.5mg, 0, 0.5mg  0, 0, 0.5mg  STOP    Return to Clinic: 2 months, as needed

## 2019-12-24 NOTE — PATIENT INSTRUCTIONS
"        You have been provided with a certain amount of medication with a specified number of refills.  Please follow up within an adequate time before you run out of medications.    REFILLS FOR CONTROLLED SUBSTANCES WILL NOT BE GIVEN WITHOUT AN APPOINTMENT.  I will not honor or fill automated refill requests from pharmacies.  You must come in for an appointment to get refills.        Please book your next appointment for myself or therapist by phone by calling our office at [phone].        Note that these follow up appointments are 20 minutes long.  It is important that we focus on medication management.  Should you need therapy, please get set up with our therapist or call your insurance company to find out which therapists are available in your area.      PLEASE BE AT LEAST 15 MINUTES EARLY FOR YOUR NEXT APPOINTMENT.  Late arrivals WILL BE TURNED AWAY AND ASKED TO RESCHEDULE.  YOU MUST COME EARLY TO ALLOW TIME FOR CHECK-IN AS WELL AS GET YOUR VITAL SIGNS AND GO OVER YOUR MEDICATIONS.  [facility] is not fair to the patients who present after you and are on time for their appointments.  It causes a delay in the appointments for patients and staff.  YOU MAY ALSO BE DISCHARGED FROM CLINIC with multiple late arrivals or "No Show" appointments.       -----------------------------------------------------------------------------------------------------------------  IF YOU FEEL SUICIDAL OR HAVING THOUGHTS OR PLANS TO HURT YOURSELF OR OTHERS, CALL 911 OR REPORT TO THE NEAREST EMERGENCY ROOM.  YOU CAN ALSO ACCESS THE FOLLOWING HOTLINE:    National Suicide Hotline Number 8-429-895-TALK (9007)                  "

## 2020-03-02 RX ORDER — DULOXETIN HYDROCHLORIDE 20 MG/1
CAPSULE, DELAYED RELEASE ORAL
Refills: 0 | OUTPATIENT
Start: 2020-03-02

## 2020-03-03 ENCOUNTER — OFFICE VISIT (OUTPATIENT)
Dept: PSYCHIATRY | Facility: CLINIC | Age: 65
End: 2020-03-03
Payer: MEDICARE

## 2020-03-03 VITALS
WEIGHT: 189.81 LBS | HEART RATE: 62 BPM | HEIGHT: 68 IN | BODY MASS INDEX: 28.77 KG/M2 | SYSTOLIC BLOOD PRESSURE: 128 MMHG | DIASTOLIC BLOOD PRESSURE: 70 MMHG

## 2020-03-03 DIAGNOSIS — G93.9 PERSONALITY AND BEHAVIORAL DISORDERS DUE TO BRAIN DISEASE, DAMAGE, AND DYSFUNCTION: ICD-10-CM

## 2020-03-03 DIAGNOSIS — S06.9X9S TRAUMATIC BRAIN INJURY WITH LOSS OF CONSCIOUSNESS, SEQUELA: ICD-10-CM

## 2020-03-03 DIAGNOSIS — F07.9 PERSONALITY AND BEHAVIORAL DISORDERS DUE TO BRAIN DISEASE, DAMAGE, AND DYSFUNCTION: ICD-10-CM

## 2020-03-03 DIAGNOSIS — F13.20 MODERATE BENZODIAZEPINE USE DISORDER: ICD-10-CM

## 2020-03-03 DIAGNOSIS — F41.1 GENERALIZED ANXIETY DISORDER: ICD-10-CM

## 2020-03-03 DIAGNOSIS — F06.30 MOOD DISORDER DUE TO A GENERAL MEDICAL CONDITION: Primary | ICD-10-CM

## 2020-03-03 PROCEDURE — 99214 PR OFFICE/OUTPT VISIT, EST, LEVL IV, 30-39 MIN: ICD-10-PCS | Mod: S$PBB,,, | Performed by: PSYCHIATRY & NEUROLOGY

## 2020-03-03 PROCEDURE — 90833 PR PSYCHOTHERAPY W/PATIENT W/E&M, 30 MIN (ADD ON): ICD-10-PCS | Mod: ,,, | Performed by: PSYCHIATRY & NEUROLOGY

## 2020-03-03 PROCEDURE — 99999 PR PBB SHADOW E&M-EST. PATIENT-LVL III: CPT | Mod: PBBFAC,,, | Performed by: PSYCHIATRY & NEUROLOGY

## 2020-03-03 PROCEDURE — 99213 OFFICE O/P EST LOW 20 MIN: CPT | Mod: PBBFAC | Performed by: PSYCHIATRY & NEUROLOGY

## 2020-03-03 PROCEDURE — 99999 PR PBB SHADOW E&M-EST. PATIENT-LVL III: ICD-10-PCS | Mod: PBBFAC,,, | Performed by: PSYCHIATRY & NEUROLOGY

## 2020-03-03 PROCEDURE — 99214 OFFICE O/P EST MOD 30 MIN: CPT | Mod: S$PBB,,, | Performed by: PSYCHIATRY & NEUROLOGY

## 2020-03-03 PROCEDURE — 90833 PSYTX W PT W E/M 30 MIN: CPT | Mod: ,,, | Performed by: PSYCHIATRY & NEUROLOGY

## 2020-03-03 RX ORDER — DULOXETIN HYDROCHLORIDE 30 MG/1
30 CAPSULE, DELAYED RELEASE ORAL DAILY
Qty: 30 CAPSULE | Refills: 3 | Status: SHIPPED | OUTPATIENT
Start: 2020-03-03 | End: 2020-05-25 | Stop reason: SDUPTHER

## 2020-03-03 RX ORDER — LORAZEPAM 0.5 MG/1
0.5 TABLET ORAL 2 TIMES DAILY
Qty: 60 TABLET | Refills: 3 | Status: SHIPPED | OUTPATIENT
Start: 2020-03-03 | End: 2020-05-25 | Stop reason: SDUPTHER

## 2020-03-03 RX ORDER — MIRTAZAPINE 30 MG/1
30 TABLET, FILM COATED ORAL NIGHTLY
Qty: 30 TABLET | Refills: 3 | Status: SHIPPED | OUTPATIENT
Start: 2020-03-03 | End: 2020-05-25 | Stop reason: SDUPTHER

## 2020-03-03 NOTE — PATIENT INSTRUCTIONS
"        You have been provided with a certain amount of medication with a specified number of refills.  Please follow up within an adequate time before you run out of medications.    REFILLS FOR CONTROLLED SUBSTANCES WILL NOT BE GIVEN WITHOUT AN APPOINTMENT.  I will not honor or fill automated refill requests from pharmacies.  You must come in for an appointment to get refills.        Please book your next appointment for myself or therapist by phone by calling our office at [phone].        Note that follow up appointments are 10-15 minutes long.  It is important that we focus on medication management.  Should you need therapy, please get set up with our therapist or call your insurance company to find out which therapists are available in your area.      PLEASE BE AT LEAST 15 MINUTES EARLY FOR YOUR NEXT APPOINTMENT.  Late arrivals WILL BE TURNED AWAY AND ASKED TO RESCHEDULE.  YOU MUST COME EARLY TO ALLOW TIME FOR CHECK-IN AS WELL AS GET YOUR VITAL SIGNS AND GO OVER YOUR MEDICATIONS.  [facility] is not fair to the patients who present after you and are on time for their appointments.  It causes a delay in the appointments for patients and staff.  YOU MAY ALSO BE DISCHARGED FROM CLINIC with multiple late arrivals or "No Show" appointments.       -----------------------------------------------------------------------------------------------------------------  IF YOU FEEL SUICIDAL OR HAVING THOUGHTS OR PLANS TO HURT YOURSELF OR OTHERS, CALL 911 OR REPORT TO THE NEAREST EMERGENCY ROOM.  YOU CAN ALSO ACCESS THE FOLLOWING HOTLINE:    National Suicide Hotline Number 1-308-315-TALK (9060)                  "
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
## 2020-04-14 RX ORDER — LORAZEPAM 0.5 MG/1
TABLET ORAL
Refills: 0 | OUTPATIENT
Start: 2020-04-14

## 2020-04-15 RX ORDER — LORAZEPAM 0.5 MG/1
TABLET ORAL
Refills: 0 | OUTPATIENT
Start: 2020-04-15

## 2020-05-25 ENCOUNTER — OFFICE VISIT (OUTPATIENT)
Dept: PSYCHIATRY | Facility: CLINIC | Age: 65
End: 2020-05-25
Payer: MEDICARE

## 2020-05-25 VITALS
HEART RATE: 74 BPM | TEMPERATURE: 98 F | WEIGHT: 190.81 LBS | DIASTOLIC BLOOD PRESSURE: 82 MMHG | BODY MASS INDEX: 28.92 KG/M2 | HEIGHT: 68 IN | SYSTOLIC BLOOD PRESSURE: 132 MMHG

## 2020-05-25 DIAGNOSIS — G93.9 PERSONALITY AND BEHAVIORAL DISORDERS DUE TO BRAIN DISEASE, DAMAGE, AND DYSFUNCTION: ICD-10-CM

## 2020-05-25 DIAGNOSIS — S06.9X9S TRAUMATIC BRAIN INJURY WITH LOSS OF CONSCIOUSNESS, SEQUELA: ICD-10-CM

## 2020-05-25 DIAGNOSIS — F06.30 MOOD DISORDER DUE TO A GENERAL MEDICAL CONDITION: Primary | ICD-10-CM

## 2020-05-25 DIAGNOSIS — F41.1 GENERALIZED ANXIETY DISORDER: ICD-10-CM

## 2020-05-25 DIAGNOSIS — F07.9 PERSONALITY AND BEHAVIORAL DISORDERS DUE TO BRAIN DISEASE, DAMAGE, AND DYSFUNCTION: ICD-10-CM

## 2020-05-25 PROCEDURE — 99213 OFFICE O/P EST LOW 20 MIN: CPT | Mod: S$PBB,,, | Performed by: PSYCHIATRY & NEUROLOGY

## 2020-05-25 PROCEDURE — 99213 PR OFFICE/OUTPT VISIT, EST, LEVL III, 20-29 MIN: ICD-10-PCS | Mod: S$PBB,,, | Performed by: PSYCHIATRY & NEUROLOGY

## 2020-05-25 PROCEDURE — 99999 PR PBB SHADOW E&M-EST. PATIENT-LVL III: ICD-10-PCS | Mod: PBBFAC,,, | Performed by: PSYCHIATRY & NEUROLOGY

## 2020-05-25 PROCEDURE — 99213 OFFICE O/P EST LOW 20 MIN: CPT | Mod: PBBFAC | Performed by: PSYCHIATRY & NEUROLOGY

## 2020-05-25 PROCEDURE — 90833 PSYTX W PT W E/M 30 MIN: CPT | Mod: ,,, | Performed by: PSYCHIATRY & NEUROLOGY

## 2020-05-25 PROCEDURE — 99999 PR PBB SHADOW E&M-EST. PATIENT-LVL III: CPT | Mod: PBBFAC,,, | Performed by: PSYCHIATRY & NEUROLOGY

## 2020-05-25 PROCEDURE — 90833 PR PSYCHOTHERAPY W/PATIENT W/E&M, 30 MIN (ADD ON): ICD-10-PCS | Mod: ,,, | Performed by: PSYCHIATRY & NEUROLOGY

## 2020-05-25 RX ORDER — MIRTAZAPINE 30 MG/1
30 TABLET, FILM COATED ORAL NIGHTLY
Qty: 30 TABLET | Refills: 3 | Status: SHIPPED | OUTPATIENT
Start: 2020-05-25 | End: 2020-07-28

## 2020-05-25 RX ORDER — DULOXETIN HYDROCHLORIDE 30 MG/1
30 CAPSULE, DELAYED RELEASE ORAL DAILY
Qty: 30 CAPSULE | Refills: 3 | Status: SHIPPED | OUTPATIENT
Start: 2020-05-25 | End: 2020-07-28 | Stop reason: SDUPTHER

## 2020-05-25 RX ORDER — LORAZEPAM 0.5 MG/1
0.5 TABLET ORAL 2 TIMES DAILY
Qty: 60 TABLET | Refills: 3 | Status: SHIPPED | OUTPATIENT
Start: 2020-05-25 | End: 2020-07-28 | Stop reason: SDUPTHER

## 2020-05-25 NOTE — PROGRESS NOTES
Outpatient Psychiatry Follow-Up Visit (MD/NP)    5/25/2020    Clinical Status of Patient:  Outpatient (Ambulatory)    Chief Complaint:  Anthony Joseph Rockweiler is a 65 y.o. male who presents today for follow-up of depression and anxiety.  Met with patient.      Interval History and Content of Current Session:  Interim Events/Subjective Report/Content of Current Session:  Patient Anthony Joseph Rockweiler presents for follow-up after his initial appointment.   What is scheduled as a 30 min appointment actually takes 45 min of time due to counseling and education.  Again with many long winded and tangential stories and is difficult to interrupt.  Says that his anxiety has been worse with the COVID pandemic and has stopped watching the news because he doesn't like the political stuff.  Focuses a lot of the conversation on the president.  Still very grandiose  in his statements and ego-syntonic.  He is fixated on not going down on the dose of lorazepam and is worried.  Still having regular headaches and taking excessive amounts of Excedrin and ibuprofen.  Does not except challenges of the statements that he makes and significance of his injuries.  Denies any depression but says that he can have occasional tearful episodes to things that really should not bother him, such as commercials.  Asking for no changes in medicines at this time.    Psychotherapy:  · Target symptoms: depression, anxiety   · Why chosen therapy is appropriate versus another modality: evidence based practice  · Outcome monitoring methods: self-report, observation  · Therapeutic intervention type: supportive psychotherapy  · Topics discussed/themes: relationships difficulties, stress related to medical comorbidities, building skills sets for symptom management, symptom recognition  · The patient's response to the intervention is accepting. The patient's progress toward treatment goals is poor.   · Duration of intervention: 30 minutes.    Review of  "Systems   · PSYCHIATRIC: Pertinant items are noted in the narrative.  · CONSTITUTIONAL: No weight gain or loss.   · MUSCULOSKELETAL: Positive for pain.  · NEUROLOGIC: No weakness, sensory changes, seizures, confusion, memory loss, tremor or other abnormal movements.  · RESPIRATORY: No shortness of breath.  · CARDIOVASCULAR: No tachycardia or chest pain.  · GASTROINTESTINAL: No nausea, vomiting, pain, constipation or diarrhea.   · Tinnitus - chronic    Past Medical, Family and Social History: The patient's past medical, family and social history have been reviewed and updated as appropriate within the electronic medical record - see encounter notes.    Compliance: yes    Side effects: None    Risk Parameters:  Patient reports no suicidal ideation  Patient reports no homicidal ideation  Patient reports no self-injurious behavior  Patient reports no violent behavior    Exam (detailed: at least 9 elements; comprehensive: all 15 elements)   Constitutional  Vitals:  Most recent vital signs, dated less than 90 days prior to this appointment, were reviewed.   Vitals:    05/25/20 0924   BP: 132/82   Pulse: 74   Temp: 97.7 °F (36.5 °C)   TempSrc: Oral   Weight: 86.5 kg (190 lb 12.9 oz)   Height: 5' 8" (1.727 m)        General:  unremarkable, age appropriate     Musculoskeletal  Muscle Strength/Tone:  no tremor, no tic   Gait & Station:  non-ataxic     Psychiatric  Speech:  no latency; no press   Mood & Affect:  anxious, dysthymic  expansive   Thought Process:  perseverative   Associations:  tangential   Thought Content:  normal, no suicidality, no homicidality, delusions, or paranoia   Insight:  limited awareness of illness   Judgement: limited   Orientation:  person, place, situation, time/date   Memory: intact for content of interview   Language: able to name, able to repeat   Attention Span & Concentration:  distracted   Fund of Knowledge:  intact and appropriate to age and level of education     Assessment and Diagnosis "   Status/Progress: Based on the examination today, the patient's problem(s) is/are adequately but not ideally controlled.  New problems have been presented today.   Co-morbidities are complicating management of the primary condition.  There are no active rule-out diagnoses for this patient at this time.     General Impression: We will continue pharmacological intervention and adjunctive therapy.       ICD-10-CM ICD-9-CM   1. Mood disorder due to a general medical condition F06.30 293.83   2. Traumatic brain injury with loss of consciousness, sequela S06.9X9S 907.0   3. Generalized anxiety disorder F41.1 300.02   4. 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 The risks and benefits of medication were discussed with the patient.  · Counseling provided with patient as follows: importance of compliance with chosen treatment options was emphasized, risks and benefits of treatment options, including medications, were discussed with the patient, risk factor reduction, prognosis, patient education, instructions for  management, treatment and follow-up were reviewed  1.  Continue mirtazapine 30 mg daily targeting sleep, depression, anxiety.  Warned of risk of indio, suicidality, serotonin syndrome, over-sedation, weight gain.  2.  Continue Depakote as per neurologist 250 mg in the morning and 500 mg nightly targeting seizures.  This is also an help with his mood stabilization.  3.  Educated patient about lorazepam taper.  He has been on this medication for many years and his dose has increased.  He is having breakthrough withdrawal and anxiety in between doses.  This is because of tolerance.  He needs to taper off as this is likely disinhibiting his behaviors.   The withdrawal is also likely increasing his anxiety.  He likely has a REN up regulation which is  also increasing anxiety.  He is also having some memory changes likely due to long-term use of this medication.   Again, tapering dose of lorazepam 0.5 mg decreased to 2 times daily and provided with a taper.  Warned of risk of oversedation, falls, and not to drink or drive until the effects of the medication are known.  Warned of risk of addictive and withdrawal potential.  Warned patient to keep medications in pill bottle and not to carry loosely.  4.  Educated patient that he needs intensive individual therapy as well as couples therapy but he is not interested in either at this time.  5.  Will need to watch patient because he has addictive and manipulative behaviors.  He has potential for impulsive actions and drug misuse because of the traumatic brain injury.  6.  May consider augmentation with lamotrigine are topiramate in the future to help as headache prevention medicine and mood stabilizer.  7.  Educated patient that his heavy use of Excedrin and ibuprofen is likely causing rebound headaches and tinnitus but he disagrees.    8.  This will be a difficult case.    9.  Urged patient to let me increase Cymbalta to 60 mg daily but he is adamant to stay on his current dose.  Continue Cymbalta 30 mg daily targeting depression, anxiety, chronic pains.  Warned of risk of indio, suicidality, serotonin syndrome.  10.  A lot of attention seeking in grandiose characteristics which is highly indicative of a personality disorder, likely due to the head injury.  This makes him a very difficult person to deal with on a personal level.  He needs intense therapy better understanding, insight, and guidance but is not open to going to an individual therapist.  11.  Told patient that I do not feel comfortable continuing his lorazepam and I will taper him off over the course of the next few months.  If he does not wish to go along with this, he is welcome to seek care elsewhere.  I am looking out for his best interest from a  long-term standpoint.  I believe that the long-term use of lorazepam is causing disinhibition, increased anxiety, memory changes.  It will put him at a long-term risk of dementia and falls as he ages.  12.  Told patient to seek care with an ENT or neurologist for his tinnitus.  13.  Low threshold for psychiatric hospitalization should he threaten suicidal statements or actions.      Return to Clinic: 3 months, as needed

## 2020-05-25 NOTE — PATIENT INSTRUCTIONS
"        You have been provided with a certain amount of medication with a specified number of refills.  Please follow up within an adequate time before you run out of medications.    REFILLS FOR CONTROLLED SUBSTANCES WILL NOT BE GIVEN WITHOUT AN APPOINTMENT.  I will not honor or fill automated refill requests from pharmacies.  You must come in for an appointment to get refills.        Please book your next appointment for myself or therapist by phone by calling our office at [phone].        Note that follow up appointments are 10-15 minutes long.  It is important that we focus on medication management.  Should you need therapy, please get set up with our therapist or call your insurance company to find out which therapists are available in your area.      PLEASE BE AT LEAST 15 MINUTES EARLY FOR YOUR NEXT APPOINTMENT.  Late arrivals WILL BE TURNED AWAY AND ASKED TO RESCHEDULE.  YOU MUST COME EARLY TO ALLOW TIME FOR CHECK-IN AS WELL AS GET YOUR VITAL SIGNS AND GO OVER YOUR MEDICATIONS.  [facility] is not fair to the patients who present after you and are on time for their appointments.  It causes a delay in the appointments for patients and staff.  YOU MAY ALSO BE DISCHARGED FROM CLINIC with multiple late arrivals or "No Show" appointments.       -----------------------------------------------------------------------------------------------------------------  IF YOU FEEL SUICIDAL OR HAVING THOUGHTS OR PLANS TO HURT YOURSELF OR OTHERS, CALL 911 OR REPORT TO THE NEAREST EMERGENCY ROOM.  YOU CAN ALSO ACCESS THE FOLLOWING HOTLINE:    National Suicide Hotline Number 3-518-778-TALK (3321)                      Look up German Henao    Look up pseudobulbar affect from head injury  "

## 2020-06-10 ENCOUNTER — TELEPHONE (OUTPATIENT)
Dept: PSYCHIATRY | Facility: CLINIC | Age: 65
End: 2020-06-10

## 2020-06-10 NOTE — TELEPHONE ENCOUNTER
Returned patient's call to let him know that he already has a follow up scheduled with Dr Selby on 7/28/20 at 9:00.

## 2020-07-28 ENCOUNTER — OFFICE VISIT (OUTPATIENT)
Dept: PSYCHIATRY | Facility: CLINIC | Age: 65
End: 2020-07-28
Payer: MEDICARE

## 2020-07-28 VITALS
HEART RATE: 68 BPM | DIASTOLIC BLOOD PRESSURE: 72 MMHG | WEIGHT: 191.5 LBS | SYSTOLIC BLOOD PRESSURE: 126 MMHG | HEIGHT: 68 IN | TEMPERATURE: 98 F | BODY MASS INDEX: 29.02 KG/M2

## 2020-07-28 DIAGNOSIS — F13.20 MODERATE BENZODIAZEPINE USE DISORDER: ICD-10-CM

## 2020-07-28 DIAGNOSIS — F06.30 MOOD DISORDER DUE TO A GENERAL MEDICAL CONDITION: Primary | ICD-10-CM

## 2020-07-28 DIAGNOSIS — S06.9X9S TRAUMATIC BRAIN INJURY WITH LOSS OF CONSCIOUSNESS, SEQUELA: ICD-10-CM

## 2020-07-28 DIAGNOSIS — F07.9 PERSONALITY AND BEHAVIORAL DISORDERS DUE TO BRAIN DISEASE, DAMAGE, AND DYSFUNCTION: ICD-10-CM

## 2020-07-28 DIAGNOSIS — F41.1 GENERALIZED ANXIETY DISORDER: ICD-10-CM

## 2020-07-28 DIAGNOSIS — G93.9 PERSONALITY AND BEHAVIORAL DISORDERS DUE TO BRAIN DISEASE, DAMAGE, AND DYSFUNCTION: ICD-10-CM

## 2020-07-28 PROCEDURE — 99214 OFFICE O/P EST MOD 30 MIN: CPT | Mod: S$PBB,,, | Performed by: PSYCHIATRY & NEUROLOGY

## 2020-07-28 PROCEDURE — 90833 PSYTX W PT W E/M 30 MIN: CPT | Mod: ,,, | Performed by: PSYCHIATRY & NEUROLOGY

## 2020-07-28 PROCEDURE — 99999 PR PBB SHADOW E&M-EST. PATIENT-LVL IV: CPT | Mod: PBBFAC,,, | Performed by: PSYCHIATRY & NEUROLOGY

## 2020-07-28 PROCEDURE — 99214 OFFICE O/P EST MOD 30 MIN: CPT | Mod: PBBFAC | Performed by: PSYCHIATRY & NEUROLOGY

## 2020-07-28 PROCEDURE — 99999 PR PBB SHADOW E&M-EST. PATIENT-LVL IV: ICD-10-PCS | Mod: PBBFAC,,, | Performed by: PSYCHIATRY & NEUROLOGY

## 2020-07-28 PROCEDURE — 90833 PR PSYCHOTHERAPY W/PATIENT W/E&M, 30 MIN (ADD ON): ICD-10-PCS | Mod: ,,, | Performed by: PSYCHIATRY & NEUROLOGY

## 2020-07-28 PROCEDURE — 99214 PR OFFICE/OUTPT VISIT, EST, LEVL IV, 30-39 MIN: ICD-10-PCS | Mod: S$PBB,,, | Performed by: PSYCHIATRY & NEUROLOGY

## 2020-07-28 RX ORDER — DULOXETIN HYDROCHLORIDE 30 MG/1
30 CAPSULE, DELAYED RELEASE ORAL DAILY
Qty: 30 CAPSULE | Refills: 3 | Status: SHIPPED | OUTPATIENT
Start: 2020-07-28 | End: 2020-10-27

## 2020-07-28 RX ORDER — LORAZEPAM 0.5 MG/1
0.5 TABLET ORAL 2 TIMES DAILY
Qty: 60 TABLET | Refills: 3 | Status: SHIPPED | OUTPATIENT
Start: 2020-07-28 | End: 2020-10-27 | Stop reason: SDUPTHER

## 2020-07-28 RX ORDER — MIRTAZAPINE 45 MG/1
45 TABLET, FILM COATED ORAL NIGHTLY
Qty: 30 TABLET | Refills: 3 | Status: SHIPPED | OUTPATIENT
Start: 2020-07-28 | End: 2020-10-27

## 2020-07-28 NOTE — PATIENT INSTRUCTIONS
"        You have been provided with a certain amount of medication with a specified number of refills.  Please follow up within an adequate time before you run out of medications.    REFILLS FOR CONTROLLED SUBSTANCES WILL NOT BE GIVEN WITHOUT AN APPOINTMENT.  I will not honor or fill automated refill requests from pharmacies.  You must come in for an appointment to get refills.        Please book your next appointment for myself or therapist by phone by calling our office at [phone].        Note that follow up appointments are 10-15 minutes long.  It is important that we focus on medication management.  Should you need therapy, please get set up with our therapist or call your insurance company to find out which therapists are available in your area.      PLEASE BE AT LEAST 15 MINUTES EARLY FOR YOUR NEXT APPOINTMENT.  Late arrivals WILL BE TURNED AWAY AND ASKED TO RESCHEDULE.  YOU MUST COME EARLY TO ALLOW TIME FOR CHECK-IN AS WELL AS GET YOUR VITAL SIGNS AND GO OVER YOUR MEDICATIONS.  [facility] is not fair to the patients who present after you and are on time for their appointments.  It causes a delay in the appointments for patients and staff.  YOU MAY ALSO BE DISCHARGED FROM CLINIC with multiple late arrivals or "No Show" appointments.       -----------------------------------------------------------------------------------------------------------------  IF YOU FEEL SUICIDAL OR HAVING THOUGHTS OR PLANS TO HURT YOURSELF OR OTHERS, CALL 911 OR REPORT TO THE NEAREST EMERGENCY ROOM.  YOU CAN ALSO ACCESS THE FOLLOWING HOTLINE:    National Suicide Hotline Number 9-759-148-TALK (4145)                  "

## 2020-07-28 NOTE — PROGRESS NOTES
Outpatient Psychiatry Follow-Up Visit (MD/NP)    7/28/2020    Clinical Status of Patient:  Outpatient (Ambulatory)    Chief Complaint:  Anthony Joseph Rockweiler is a 65 y.o. male who presents today for follow-up of depression and anxiety.  Met with patient.      Interval History and Content of Current Session:  Interim Events/Subjective Report/Content of Current Session:  Patient Anthony Joseph Rockweiler presents for follow-up after his initial appointment.   He is noted to be late for his appointment but seen anyway and still takes extra time for care and treatment.  What is scheduled as a 30 min appointment actually takes 45 min of time due to counseling and education.  He starts off talking about a lot of political things in his grandiose manner.  He says that 1 day he forgot to take his lorazepam and started to have some withdrawals noted by nausea.  This went away after he took the medicine.  Not sleeping well and getting up many times throughout the night.  Still takes mirtazapine every night which helps somewhat.  Eating in the afternoons and gaining a little weight which he is not happy about.  Fixated on losing weight and going to the gym.  Feels fatigued and tired most of the day.  Denies any depression.  Focus is on his anxiety and his constant need of lorazepam.  Says that he takes it twice daily most days but can occasionally take a 3rd pill throughout the day.  Still has a lot of interaction problems with his family and his wife.  He is not sure why people do not like him.  Asking to continue his medicines with possibly an increase for sleep.    Psychotherapy:  · Target symptoms: depression, anxiety   · Why chosen therapy is appropriate versus another modality: evidence based practice  · Outcome monitoring methods: self-report, observation  · Therapeutic intervention type: supportive psychotherapy  · Topics discussed/themes: relationships difficulties, stress related to medical comorbidities,  "building skills sets for symptom management, symptom recognition  · The patient's response to the intervention is accepting. The patient's progress toward treatment goals is poor.   · Duration of intervention: 30 minutes.    Review of Systems   · PSYCHIATRIC: Pertinant items are noted in the narrative.  · CONSTITUTIONAL: No weight gain or loss.   · MUSCULOSKELETAL: Positive for pain.  · NEUROLOGIC: No weakness, sensory changes, seizures, confusion, memory loss, tremor or other abnormal movements.  · RESPIRATORY: No shortness of breath.  · CARDIOVASCULAR: No tachycardia or chest pain.  · GASTROINTESTINAL: No nausea, vomiting, pain, constipation or diarrhea.   · Tinnitus - chronic    Past Medical, Family and Social History: The patient's past medical, family and social history have been reviewed and updated as appropriate within the electronic medical record - see encounter notes.    Compliance: yes    Side effects: None    Risk Parameters:  Patient reports no suicidal ideation  Patient reports no homicidal ideation  Patient reports no self-injurious behavior  Patient reports no violent behavior    Exam (detailed: at least 9 elements; comprehensive: all 15 elements)   Constitutional  Vitals:  Most recent vital signs, dated less than 90 days prior to this appointment, were reviewed.   Vitals:    07/28/20 0916   BP: 126/72   Pulse: 68   Temp: 97.7 °F (36.5 °C)   TempSrc: Other (see comments)   Weight: 86.8 kg (191 lb 7.5 oz)   Height: 5' 8" (1.727 m)        General:  unremarkable, age appropriate     Musculoskeletal  Muscle Strength/Tone:  no tremor, no tic   Gait & Station:  non-ataxic     Psychiatric  Speech:  no latency; no press   Mood & Affect:  anxious, dysthymic  expansive   Thought Process:  perseverative   Associations:  tangential   Thought Content:  normal, no suicidality, no homicidality, delusions, or paranoia   Insight:  limited awareness of illness   Judgement: limited   Orientation:  person, place, " situation, time/date   Memory: intact for content of interview   Language: able to name, able to repeat   Attention Span & Concentration:  distracted   Fund of Knowledge:  intact and appropriate to age and level of education     Assessment and Diagnosis   Status/Progress: Based on the examination today, the patient's problem(s) is/are adequately but not ideally controlled.  New problems have been presented today.   Co-morbidities are complicating management of the primary condition.  There are no active rule-out diagnoses for this patient at this time.     General Impression: We will continue pharmacological intervention and adjunctive therapy.       ICD-10-CM ICD-9-CM   1. Mood disorder due to a general medical condition  F06.30 293.83   2. Traumatic brain injury with loss of consciousness, sequela  S06.9X9S 907.0   3. Generalized anxiety disorder  F41.1 300.02   4. 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 Moderate benzodiazepine use disorder  F13.20 305.40       Intervention/Counseling/Treatment Plan   · Medication Management: Continue current medications. The risks and benefits of medication were discussed with the patient.  · Counseling provided with patient as follows: importance of compliance with chosen treatment options was emphasized, risks and benefits of treatment options, including medications, were discussed with the patient, risk factor reduction, prognosis, patient education, instructions for  management, treatment and follow-up were reviewed  1.   increase mirtazapine to 45 mg daily targeting sleep, depression, anxiety.  Warned of risk of indio, suicidality, serotonin syndrome, over-sedation, weight gain.  2.  Continue Depakote as per neurologist 250 mg in the morning and 500 mg nightly targeting seizures.  This is also an help with his mood stabilization.  3.  Educated patient about lorazepam taper.  He has been on this  medication for many years and his dose has increased.  He is having breakthrough withdrawal and anxiety in between doses.  This is because of tolerance.  He needs to taper off as this is likely disinhibiting his behaviors.   The withdrawal is also likely increasing his anxiety.  He likely has a REN up regulation which is also increasing anxiety.  He is also having some memory changes likely due to long-term use of this medication.   Again, tapering dose of lorazepam 0.5 mg decreased to 2 times daily and provided with a taper.  Warned of risk of oversedation, falls, and not to drink or drive until the effects of the medication are known.  Warned of risk of addictive and withdrawal potential.  Warned patient to keep medications in pill bottle and not to carry loosely.  4.  Educated patient that he needs intensive individual therapy as well as couples therapy but he is not interested in either at this time.  5.  Will need to watch patient because he has addictive and manipulative behaviors.  He has potential for impulsive actions and drug misuse because of the traumatic brain injury.  6.  May consider augmentation with lamotrigine are topiramate in the future to help as headache prevention medicine and mood stabilizer.  7.  Educated patient that his heavy use of Excedrin and ibuprofen is likely causing rebound headaches and tinnitus but he disagrees.    8.  This will be a difficult case.    9.  Urged patient to let me increase Cymbalta to 60 mg daily but he is adamant to stay on his current dose.  Continue Cymbalta 30 mg daily targeting depression, anxiety, chronic pains.  Warned of risk of indio, suicidality, serotonin syndrome.  10.  A lot of attention seeking in grandiose characteristics which is highly indicative of a personality disorder, likely due to the head injury.  This makes him a very difficult person to deal with on a personal level.  He needs intense therapy better understanding, insight, and guidance  but is not open to going to an individual therapist.  11.  Told patient that I do not feel comfortable continuing his lorazepam and I will taper him off over the course of the next few months.  If he does not wish to go along with this, he is welcome to seek care elsewhere.  I am looking out for his best interest from a long-term standpoint.  I believe that the long-term use of lorazepam is causing disinhibition, increased anxiety, memory changes.  It will put him at a long-term risk of dementia and falls as he ages.  12.  Told patient to seek care with an ENT or neurologist for his tinnitus.  13.  Low threshold for psychiatric hospitalization should he threaten suicidal statements or actions.  14.  May consider changing patient to trazodone as a sleep aid.      Return to Clinic: 3 months, as needed

## 2020-10-27 ENCOUNTER — OFFICE VISIT (OUTPATIENT)
Dept: PSYCHIATRY | Facility: CLINIC | Age: 65
End: 2020-10-27
Payer: MEDICARE

## 2020-10-27 VITALS
WEIGHT: 198.88 LBS | BODY MASS INDEX: 30.14 KG/M2 | HEIGHT: 68 IN | DIASTOLIC BLOOD PRESSURE: 84 MMHG | HEART RATE: 74 BPM | SYSTOLIC BLOOD PRESSURE: 142 MMHG

## 2020-10-27 DIAGNOSIS — F06.30 MOOD DISORDER DUE TO A GENERAL MEDICAL CONDITION: Primary | ICD-10-CM

## 2020-10-27 DIAGNOSIS — F07.9 PERSONALITY AND BEHAVIORAL DISORDERS DUE TO BRAIN DISEASE, DAMAGE, AND DYSFUNCTION: ICD-10-CM

## 2020-10-27 DIAGNOSIS — F41.1 GENERALIZED ANXIETY DISORDER: ICD-10-CM

## 2020-10-27 DIAGNOSIS — G93.9 PERSONALITY AND BEHAVIORAL DISORDERS DUE TO BRAIN DISEASE, DAMAGE, AND DYSFUNCTION: ICD-10-CM

## 2020-10-27 DIAGNOSIS — S06.9X9S TRAUMATIC BRAIN INJURY WITH LOSS OF CONSCIOUSNESS, SEQUELA: ICD-10-CM

## 2020-10-27 PROCEDURE — 99999 PR PBB SHADOW E&M-EST. PATIENT-LVL III: CPT | Mod: PBBFAC,,, | Performed by: PSYCHIATRY & NEUROLOGY

## 2020-10-27 PROCEDURE — 99213 OFFICE O/P EST LOW 20 MIN: CPT | Mod: PBBFAC | Performed by: PSYCHIATRY & NEUROLOGY

## 2020-10-27 PROCEDURE — 99214 OFFICE O/P EST MOD 30 MIN: CPT | Mod: S$PBB,,, | Performed by: PSYCHIATRY & NEUROLOGY

## 2020-10-27 PROCEDURE — 99214 PR OFFICE/OUTPT VISIT, EST, LEVL IV, 30-39 MIN: ICD-10-PCS | Mod: S$PBB,,, | Performed by: PSYCHIATRY & NEUROLOGY

## 2020-10-27 PROCEDURE — 99999 PR PBB SHADOW E&M-EST. PATIENT-LVL III: ICD-10-PCS | Mod: PBBFAC,,, | Performed by: PSYCHIATRY & NEUROLOGY

## 2020-10-27 RX ORDER — LORAZEPAM 0.5 MG/1
0.5 TABLET ORAL 2 TIMES DAILY
Qty: 60 TABLET | Refills: 3 | Status: SHIPPED | OUTPATIENT
Start: 2020-10-27 | End: 2021-01-27

## 2020-10-27 RX ORDER — DULOXETIN HYDROCHLORIDE 60 MG/1
60 CAPSULE, DELAYED RELEASE ORAL DAILY
Qty: 90 CAPSULE | Refills: 1 | Status: SHIPPED | OUTPATIENT
Start: 2020-10-27 | End: 2021-04-27 | Stop reason: SDUPTHER

## 2020-10-27 RX ORDER — MIRTAZAPINE 15 MG/1
15 TABLET, FILM COATED ORAL NIGHTLY
Qty: 90 TABLET | Refills: 1 | Status: SHIPPED | OUTPATIENT
Start: 2020-10-27 | End: 2021-01-27

## 2020-10-27 NOTE — PATIENT INSTRUCTIONS
"        You have been provided with a certain amount of medication with a specified number of refills.  Please follow up within an adequate time before you run out of medications.    REFILLS FOR CONTROLLED SUBSTANCES WILL NOT BE GIVEN WITHOUT AN APPOINTMENT.  I will not honor or fill automated refill requests from pharmacies.  You must come in for an appointment to get refills.        Please book your next appointment for myself or therapist by phone by calling our office at [phone].        Note that follow up appointments are 10-20 minutes long.  It is important that we focus on medication management.  Should you need therapy, please get set up with our therapist or call your insurance company to find out which therapists are available in your area.      PLEASE BE AT LEAST 15 MINUTES EARLY FOR YOUR NEXT APPOINTMENT.  Late arrivals WILL BE TURNED AWAY AND ASKED TO RESCHEDULE.  YOU MUST COME EARLY TO ALLOW TIME FOR CHECK-IN AS WELL AS GET YOUR VITAL SIGNS AND GO OVER YOUR MEDICATIONS.  [facility] is not fair to the patients who present after you and are on time for their appointments.  It causes a delay in the appointments for patients and staff.  YOU MAY ALSO BE DISCHARGED FROM CLINIC with multiple late arrivals, late cancellations, or "No Show" appointments.       -----------------------------------------------------------------------------------------------------------------  IF YOU FEEL SUICIDAL OR HAVING THOUGHTS OR PLANS TO HURT YOURSELF OR OTHERS, CALL 911 OR REPORT TO THE NEAREST EMERGENCY ROOM.  YOU CAN ALSO ACCESS THE FOLLOWING HOTLINE:    National Suicide Prevention Hotline Number 6-535-049-TALK (9923)                      "

## 2020-10-27 NOTE — PROGRESS NOTES
Outpatient Psychiatry Follow-Up Visit (MD/NP)    10/27/2020    Clinical Status of Patient:  Outpatient (Ambulatory)    Chief Complaint:  Anthony Joseph Rockweiler is a 65 y.o. male who presents today for follow-up of depression and anxiety.  Met with patient.      Interval History and Content of Current Session:  Interim Events/Subjective Report/Content of Current Session:  Patient Anthony Joseph Rockweiler presents for follow-up.   What is scheduled as a 30 min appointment actually takes 45 min of time due to counseling and education.  Has been gaining weight and feels that it may be due to his thyroid.  He is going to speak with his primary care doctor about increasing his levothyroxine.  He has a lot of anxious rambling, mostly about politics and the history of his injury.  Feels very anxious lately and worried about a lot of things in life.  He does not want to be labeled higher functioning in regards to his head injury.  Still going to the gym and staying active.  Taking care of his grand children.  Asking if we can decrease his mirtazapine to a lower dose to see if it will help with some weight loss.  He does note that the medication helps with his sleep.    Psychotherapy:  · Target symptoms: depression, anxiety   · Why chosen therapy is appropriate versus another modality: evidence based practice  · Outcome monitoring methods: self-report, observation  · Therapeutic intervention type: supportive psychotherapy  · Topics discussed/themes: relationships difficulties, stress related to medical comorbidities, building skills sets for symptom management, symptom recognition  · The patient's response to the intervention is accepting. The patient's progress toward treatment goals is poor.   · Duration of intervention: 30 minutes.    Review of Systems   · PSYCHIATRIC: Pertinant items are noted in the narrative.  · CONSTITUTIONAL: No weight gain or loss.   · MUSCULOSKELETAL: Positive for pain.  · NEUROLOGIC: No  "weakness, sensory changes, seizures, confusion, memory loss, tremor or other abnormal movements.  · RESPIRATORY: No shortness of breath.  · CARDIOVASCULAR: No tachycardia or chest pain.  · GASTROINTESTINAL: No nausea, vomiting, pain, constipation or diarrhea.   · Tinnitus - chronic    Past Medical, Family and Social History: The patient's past medical, family and social history have been reviewed and updated as appropriate within the electronic medical record - see encounter notes.    Compliance: yes    Side effects: None    Risk Parameters:  Patient reports no suicidal ideation  Patient reports no homicidal ideation  Patient reports no self-injurious behavior  Patient reports no violent behavior    Exam (detailed: at least 9 elements; comprehensive: all 15 elements)   Constitutional  Vitals:  Most recent vital signs, dated less than 90 days prior to this appointment, were reviewed.   Vitals:    10/27/20 0926   BP: (!) 142/84   Pulse: 74   Weight: 90.2 kg (198 lb 13.7 oz)   Height: 5' 8" (1.727 m)        General:  unremarkable, age appropriate     Musculoskeletal  Muscle Strength/Tone:  no tremor, no tic   Gait & Station:  non-ataxic     Psychiatric  Speech:  no latency; no press   Mood & Affect:  anxious, dysthymic  expansive   Thought Process:  perseverative   Associations:  tangential   Thought Content:  normal, no suicidality, no homicidality, delusions, or paranoia   Insight:  limited awareness of illness   Judgement: limited   Orientation:  person, place, situation, time/date   Memory: intact for content of interview   Language: able to name, able to repeat   Attention Span & Concentration:  distracted   Fund of Knowledge:  intact and appropriate to age and level of education     Assessment and Diagnosis   Status/Progress: Based on the examination today, the patient's problem(s) is/are adequately but not ideally controlled.  New problems have been presented today.   Co-morbidities are complicating management " of the primary condition.  There are no active rule-out diagnoses for this patient at this time.     General Impression: We will continue pharmacological intervention and adjunctive therapy.       ICD-10-CM ICD-9-CM   1. Mood disorder due to a general medical condition  F06.30 293.83   2. Traumatic brain injury with loss of consciousness, sequela  S06.9X9S 907.0   3. Generalized anxiety disorder  F41.1 300.02   4. 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 The risks and benefits of medication were discussed with the patient.  · Counseling provided with patient as follows: importance of compliance with chosen treatment options was emphasized, risks and benefits of treatment options, including medications, were discussed with the patient, risk factor reduction, prognosis, patient education, instructions for  management, treatment and follow-up were reviewed  1.   decrease mirtazapine to 15 mg daily targeting sleep, depression, anxiety.  Warned of risk of indio, suicidality, serotonin syndrome, over-sedation, weight gain.  2.  Continue Depakote as per neurologist 250 mg in the morning and 500 mg nightly targeting seizures.  This is also an help with his mood stabilization.  3.  Educated patient about lorazepam taper.  He has been on this medication for many years and his dose has increased.  He is having breakthrough withdrawal and anxiety in between doses.  This is because of tolerance.  He needs to taper off as this is likely disinhibiting his behaviors.   The withdrawal is also likely increasing his anxiety.  He likely has a REN up regulation which is also increasing anxiety.  He is also having some memory changes likely due to long-term use of this medication.   Again, tapering dose of lorazepam 0.5 mg decreased to 2 times daily and provided with a  taper.  Warned of risk of oversedation, falls, and not to drink or drive until the effects of the medication are known.  Warned of risk of addictive and withdrawal potential.  Warned patient to keep medications in pill bottle and not to carry loosely.  4.  Educated patient that he needs intensive individual therapy as well as couples therapy but he is not interested in either at this time.  5.  Will need to watch patient because he has addictive and manipulative behaviors.  He has potential for impulsive actions and drug misuse because of the traumatic brain injury.  6.  May consider augmentation with lamotrigine are topiramate in the future to help as headache prevention medicine and mood stabilizer.  7.  Educated patient that his heavy use of Excedrin and ibuprofen is likely causing rebound headaches and tinnitus but he disagrees.    8.  This will be a difficult case.    9.  Increase Cymbalta to 60 mg daily targeting depression, anxiety, chronic pains.  Warned of risk of indio, suicidality, serotonin syndrome.  This may off label help with some weight loss.  10.  A lot of attention seeking in grandiose characteristics which is highly indicative of a personality disorder, likely due to the head injury.  This makes him a very difficult person to deal with on a personal level.  He needs intense therapy better understanding, insight, and guidance but is not open to going to an individual therapist.  11.  Told patient that I do not feel comfortable continuing his lorazepam and I will taper him off over the course of the next few months.  If he does not wish to go along with this, he is welcome to seek care elsewhere.  I am looking out for his best interest from a long-term standpoint.  I believe that the long-term use of lorazepam is causing disinhibition, increased anxiety, memory changes.  It will put him at a long-term risk of dementia and falls as he ages.  12.  Told patient to seek care with an ENT or neurologist  for his tinnitus.  13.  May consider changing patient to trazodone as a sleep aid.      Return to Clinic: 3 months, as needed

## 2021-01-27 ENCOUNTER — TELEPHONE (OUTPATIENT)
Dept: PSYCHIATRY | Facility: CLINIC | Age: 66
End: 2021-01-27

## 2021-01-27 ENCOUNTER — OFFICE VISIT (OUTPATIENT)
Dept: PSYCHIATRY | Facility: CLINIC | Age: 66
End: 2021-01-27
Payer: MEDICARE

## 2021-01-27 VITALS
BODY MASS INDEX: 27.23 KG/M2 | HEIGHT: 68 IN | WEIGHT: 179.69 LBS | OXYGEN SATURATION: 98 % | SYSTOLIC BLOOD PRESSURE: 135 MMHG | DIASTOLIC BLOOD PRESSURE: 81 MMHG | HEART RATE: 74 BPM

## 2021-01-27 DIAGNOSIS — G93.9 PERSONALITY AND BEHAVIORAL DISORDERS DUE TO BRAIN DISEASE, DAMAGE, AND DYSFUNCTION: ICD-10-CM

## 2021-01-27 DIAGNOSIS — F06.30 MOOD DISORDER DUE TO A GENERAL MEDICAL CONDITION: Primary | ICD-10-CM

## 2021-01-27 DIAGNOSIS — S06.9X9S TRAUMATIC BRAIN INJURY WITH LOSS OF CONSCIOUSNESS, SEQUELA: ICD-10-CM

## 2021-01-27 DIAGNOSIS — F07.9 PERSONALITY AND BEHAVIORAL DISORDERS DUE TO BRAIN DISEASE, DAMAGE, AND DYSFUNCTION: ICD-10-CM

## 2021-01-27 DIAGNOSIS — F41.1 GENERALIZED ANXIETY DISORDER: ICD-10-CM

## 2021-01-27 PROCEDURE — 99999 PR PBB SHADOW E&M-EST. PATIENT-LVL III: CPT | Mod: PBBFAC,,, | Performed by: PSYCHIATRY & NEUROLOGY

## 2021-01-27 PROCEDURE — 90833 PR PSYCHOTHERAPY W/PATIENT W/E&M, 30 MIN (ADD ON): ICD-10-PCS | Mod: ,,, | Performed by: PSYCHIATRY & NEUROLOGY

## 2021-01-27 PROCEDURE — 90833 PSYTX W PT W E/M 30 MIN: CPT | Mod: ,,, | Performed by: PSYCHIATRY & NEUROLOGY

## 2021-01-27 PROCEDURE — 99214 OFFICE O/P EST MOD 30 MIN: CPT | Mod: S$PBB,,, | Performed by: PSYCHIATRY & NEUROLOGY

## 2021-01-27 PROCEDURE — 99214 PR OFFICE/OUTPT VISIT, EST, LEVL IV, 30-39 MIN: ICD-10-PCS | Mod: S$PBB,,, | Performed by: PSYCHIATRY & NEUROLOGY

## 2021-01-27 PROCEDURE — 99213 OFFICE O/P EST LOW 20 MIN: CPT | Mod: PBBFAC | Performed by: PSYCHIATRY & NEUROLOGY

## 2021-01-27 PROCEDURE — 99999 PR PBB SHADOW E&M-EST. PATIENT-LVL III: ICD-10-PCS | Mod: PBBFAC,,, | Performed by: PSYCHIATRY & NEUROLOGY

## 2021-01-27 RX ORDER — LORAZEPAM 1 MG/1
1 TABLET ORAL 2 TIMES DAILY PRN
Qty: 60 TABLET | Refills: 2 | Status: SHIPPED | OUTPATIENT
Start: 2021-01-27 | End: 2021-04-27 | Stop reason: SDUPTHER

## 2021-04-15 ENCOUNTER — PATIENT MESSAGE (OUTPATIENT)
Dept: RESEARCH | Facility: HOSPITAL | Age: 66
End: 2021-04-15

## 2021-04-27 ENCOUNTER — OFFICE VISIT (OUTPATIENT)
Dept: PSYCHIATRY | Facility: CLINIC | Age: 66
End: 2021-04-27
Payer: MEDICARE

## 2021-04-27 VITALS
OXYGEN SATURATION: 96 % | HEART RATE: 75 BPM | BODY MASS INDEX: 27.39 KG/M2 | WEIGHT: 180.75 LBS | DIASTOLIC BLOOD PRESSURE: 80 MMHG | SYSTOLIC BLOOD PRESSURE: 130 MMHG | HEIGHT: 68 IN

## 2021-04-27 DIAGNOSIS — F07.9 PERSONALITY AND BEHAVIORAL DISORDERS DUE TO BRAIN DISEASE, DAMAGE, AND DYSFUNCTION: ICD-10-CM

## 2021-04-27 DIAGNOSIS — G93.9 PERSONALITY AND BEHAVIORAL DISORDERS DUE TO BRAIN DISEASE, DAMAGE, AND DYSFUNCTION: ICD-10-CM

## 2021-04-27 DIAGNOSIS — S06.9X9S TRAUMATIC BRAIN INJURY WITH LOSS OF CONSCIOUSNESS, SEQUELA: ICD-10-CM

## 2021-04-27 DIAGNOSIS — F06.30 MOOD DISORDER DUE TO A GENERAL MEDICAL CONDITION: Primary | ICD-10-CM

## 2021-04-27 DIAGNOSIS — F41.1 GENERALIZED ANXIETY DISORDER: ICD-10-CM

## 2021-04-27 PROCEDURE — 90833 PSYTX W PT W E/M 30 MIN: CPT | Mod: ,,, | Performed by: PSYCHIATRY & NEUROLOGY

## 2021-04-27 PROCEDURE — 90833 PR PSYCHOTHERAPY W/PATIENT W/E&M, 30 MIN (ADD ON): ICD-10-PCS | Mod: ,,, | Performed by: PSYCHIATRY & NEUROLOGY

## 2021-04-27 PROCEDURE — 99213 OFFICE O/P EST LOW 20 MIN: CPT | Mod: PBBFAC | Performed by: PSYCHIATRY & NEUROLOGY

## 2021-04-27 PROCEDURE — 99213 PR OFFICE/OUTPT VISIT, EST, LEVL III, 20-29 MIN: ICD-10-PCS | Mod: S$PBB,,, | Performed by: PSYCHIATRY & NEUROLOGY

## 2021-04-27 PROCEDURE — 99999 PR PBB SHADOW E&M-EST. PATIENT-LVL III: CPT | Mod: PBBFAC,,, | Performed by: PSYCHIATRY & NEUROLOGY

## 2021-04-27 PROCEDURE — 99999 PR PBB SHADOW E&M-EST. PATIENT-LVL III: ICD-10-PCS | Mod: PBBFAC,,, | Performed by: PSYCHIATRY & NEUROLOGY

## 2021-04-27 PROCEDURE — 99213 OFFICE O/P EST LOW 20 MIN: CPT | Mod: S$PBB,,, | Performed by: PSYCHIATRY & NEUROLOGY

## 2021-04-27 RX ORDER — LORAZEPAM 1 MG/1
1 TABLET ORAL 2 TIMES DAILY PRN
Qty: 60 TABLET | Refills: 2 | Status: SHIPPED | OUTPATIENT
Start: 2021-04-27 | End: 2021-07-27 | Stop reason: SDUPTHER

## 2021-04-27 RX ORDER — DULOXETIN HYDROCHLORIDE 60 MG/1
60 CAPSULE, DELAYED RELEASE ORAL DAILY
Qty: 90 CAPSULE | Refills: 1 | Status: SHIPPED | OUTPATIENT
Start: 2021-04-27 | End: 2021-07-27 | Stop reason: SDUPTHER

## 2021-07-27 ENCOUNTER — OFFICE VISIT (OUTPATIENT)
Dept: PSYCHIATRY | Facility: CLINIC | Age: 66
End: 2021-07-27
Payer: MEDICARE

## 2021-07-27 ENCOUNTER — IMMUNIZATION (OUTPATIENT)
Dept: OBSTETRICS AND GYNECOLOGY | Facility: CLINIC | Age: 66
End: 2021-07-27
Payer: MEDICARE

## 2021-07-27 VITALS
SYSTOLIC BLOOD PRESSURE: 125 MMHG | DIASTOLIC BLOOD PRESSURE: 80 MMHG | HEART RATE: 68 BPM | BODY MASS INDEX: 27.45 KG/M2 | OXYGEN SATURATION: 97 % | HEIGHT: 68 IN | WEIGHT: 181.13 LBS

## 2021-07-27 DIAGNOSIS — S06.9X9S TRAUMATIC BRAIN INJURY WITH LOSS OF CONSCIOUSNESS, SEQUELA: ICD-10-CM

## 2021-07-27 DIAGNOSIS — Z23 NEED FOR VACCINATION: Primary | ICD-10-CM

## 2021-07-27 DIAGNOSIS — F41.1 GENERALIZED ANXIETY DISORDER: ICD-10-CM

## 2021-07-27 DIAGNOSIS — F07.9 PERSONALITY AND BEHAVIORAL DISORDERS DUE TO BRAIN DISEASE, DAMAGE, AND DYSFUNCTION: ICD-10-CM

## 2021-07-27 DIAGNOSIS — G93.9 PERSONALITY AND BEHAVIORAL DISORDERS DUE TO BRAIN DISEASE, DAMAGE, AND DYSFUNCTION: ICD-10-CM

## 2021-07-27 DIAGNOSIS — F06.30 MOOD DISORDER DUE TO A GENERAL MEDICAL CONDITION: Primary | ICD-10-CM

## 2021-07-27 PROCEDURE — 90833 PR PSYCHOTHERAPY W/PATIENT W/E&M, 30 MIN (ADD ON): ICD-10-PCS | Mod: ,,, | Performed by: PSYCHIATRY & NEUROLOGY

## 2021-07-27 PROCEDURE — 99213 OFFICE O/P EST LOW 20 MIN: CPT | Mod: S$PBB,,, | Performed by: PSYCHIATRY & NEUROLOGY

## 2021-07-27 PROCEDURE — 99213 PR OFFICE/OUTPT VISIT, EST, LEVL III, 20-29 MIN: ICD-10-PCS | Mod: S$PBB,,, | Performed by: PSYCHIATRY & NEUROLOGY

## 2021-07-27 PROCEDURE — 99213 OFFICE O/P EST LOW 20 MIN: CPT | Mod: PBBFAC | Performed by: PSYCHIATRY & NEUROLOGY

## 2021-07-27 PROCEDURE — 90833 PSYTX W PT W E/M 30 MIN: CPT | Mod: ,,, | Performed by: PSYCHIATRY & NEUROLOGY

## 2021-07-27 PROCEDURE — 99999 PR PBB SHADOW E&M-EST. PATIENT-LVL III: CPT | Mod: PBBFAC,,, | Performed by: PSYCHIATRY & NEUROLOGY

## 2021-07-27 PROCEDURE — 99999 PR PBB SHADOW E&M-EST. PATIENT-LVL III: ICD-10-PCS | Mod: PBBFAC,,, | Performed by: PSYCHIATRY & NEUROLOGY

## 2021-07-27 PROCEDURE — 91300 COVID-19, MRNA, LNP-S, PF, 30 MCG/0.3 ML DOSE VACCINE: CPT | Mod: PBBFAC

## 2021-07-27 RX ORDER — LORAZEPAM 1 MG/1
1 TABLET ORAL 2 TIMES DAILY PRN
Qty: 60 TABLET | Refills: 5 | Status: SHIPPED | OUTPATIENT
Start: 2021-07-27 | End: 2021-11-29 | Stop reason: SDUPTHER

## 2021-07-27 RX ORDER — DULOXETIN HYDROCHLORIDE 60 MG/1
60 CAPSULE, DELAYED RELEASE ORAL DAILY
Qty: 90 CAPSULE | Refills: 1 | Status: SHIPPED | OUTPATIENT
Start: 2021-07-27 | End: 2021-11-29 | Stop reason: SDUPTHER

## 2021-08-18 ENCOUNTER — IMMUNIZATION (OUTPATIENT)
Dept: OBSTETRICS AND GYNECOLOGY | Facility: CLINIC | Age: 66
End: 2021-08-18
Payer: MEDICARE

## 2021-08-18 DIAGNOSIS — Z23 NEED FOR VACCINATION: Primary | ICD-10-CM

## 2021-08-18 PROCEDURE — 91300 COVID-19, MRNA, LNP-S, PF, 30 MCG/0.3 ML DOSE VACCINE: ICD-10-PCS | Mod: ,,, | Performed by: FAMILY MEDICINE

## 2021-08-18 PROCEDURE — 91300 COVID-19, MRNA, LNP-S, PF, 30 MCG/0.3 ML DOSE VACCINE: CPT | Mod: ,,, | Performed by: FAMILY MEDICINE

## 2021-08-18 PROCEDURE — 0002A COVID-19, MRNA, LNP-S, PF, 30 MCG/0.3 ML DOSE VACCINE: CPT | Mod: CV19,,, | Performed by: FAMILY MEDICINE

## 2021-08-18 PROCEDURE — 0002A COVID-19, MRNA, LNP-S, PF, 30 MCG/0.3 ML DOSE VACCINE: ICD-10-PCS | Mod: CV19,,, | Performed by: FAMILY MEDICINE

## 2021-11-29 ENCOUNTER — OFFICE VISIT (OUTPATIENT)
Dept: PSYCHIATRY | Facility: CLINIC | Age: 66
End: 2021-11-29
Payer: MEDICARE

## 2021-11-29 VITALS
WEIGHT: 184.63 LBS | HEART RATE: 72 BPM | HEIGHT: 68 IN | OXYGEN SATURATION: 97 % | SYSTOLIC BLOOD PRESSURE: 120 MMHG | DIASTOLIC BLOOD PRESSURE: 79 MMHG | BODY MASS INDEX: 27.98 KG/M2

## 2021-11-29 DIAGNOSIS — G93.9 PERSONALITY AND BEHAVIORAL DISORDERS DUE TO BRAIN DISEASE, DAMAGE, AND DYSFUNCTION: ICD-10-CM

## 2021-11-29 DIAGNOSIS — F06.30 MOOD DISORDER DUE TO A GENERAL MEDICAL CONDITION: Primary | ICD-10-CM

## 2021-11-29 DIAGNOSIS — S06.9X9S TRAUMATIC BRAIN INJURY WITH LOSS OF CONSCIOUSNESS, SEQUELA: ICD-10-CM

## 2021-11-29 DIAGNOSIS — F07.9 PERSONALITY AND BEHAVIORAL DISORDERS DUE TO BRAIN DISEASE, DAMAGE, AND DYSFUNCTION: ICD-10-CM

## 2021-11-29 DIAGNOSIS — F41.1 GENERALIZED ANXIETY DISORDER: ICD-10-CM

## 2021-11-29 PROCEDURE — 90833 PSYTX W PT W E/M 30 MIN: CPT | Mod: ,,, | Performed by: PSYCHIATRY & NEUROLOGY

## 2021-11-29 PROCEDURE — 99213 OFFICE O/P EST LOW 20 MIN: CPT | Mod: S$PBB,,, | Performed by: PSYCHIATRY & NEUROLOGY

## 2021-11-29 PROCEDURE — 99999 PR PBB SHADOW E&M-EST. PATIENT-LVL III: ICD-10-PCS | Mod: PBBFAC,,, | Performed by: PSYCHIATRY & NEUROLOGY

## 2021-11-29 PROCEDURE — 99213 PR OFFICE/OUTPT VISIT, EST, LEVL III, 20-29 MIN: ICD-10-PCS | Mod: S$PBB,,, | Performed by: PSYCHIATRY & NEUROLOGY

## 2021-11-29 PROCEDURE — 99999 PR PBB SHADOW E&M-EST. PATIENT-LVL III: CPT | Mod: PBBFAC,,, | Performed by: PSYCHIATRY & NEUROLOGY

## 2021-11-29 PROCEDURE — 99213 OFFICE O/P EST LOW 20 MIN: CPT | Mod: PBBFAC | Performed by: PSYCHIATRY & NEUROLOGY

## 2021-11-29 PROCEDURE — 90833 PR PSYCHOTHERAPY W/PATIENT W/E&M, 30 MIN (ADD ON): ICD-10-PCS | Mod: ,,, | Performed by: PSYCHIATRY & NEUROLOGY

## 2021-11-29 RX ORDER — LEVOTHYROXINE SODIUM 150 UG/1
150 TABLET ORAL DAILY
COMMUNITY
Start: 2021-11-09 | End: 2023-01-31 | Stop reason: SDUPTHER

## 2021-11-29 RX ORDER — LORAZEPAM 1 MG/1
1 TABLET ORAL 2 TIMES DAILY PRN
Qty: 60 TABLET | Refills: 5 | Status: SHIPPED | OUTPATIENT
Start: 2021-11-29 | End: 2022-02-28 | Stop reason: SDUPTHER

## 2021-11-29 RX ORDER — DULOXETIN HYDROCHLORIDE 60 MG/1
60 CAPSULE, DELAYED RELEASE ORAL DAILY
Qty: 90 CAPSULE | Refills: 1 | Status: SHIPPED | OUTPATIENT
Start: 2021-11-29 | End: 2022-02-28 | Stop reason: SDUPTHER

## 2021-12-13 ENCOUNTER — TELEPHONE (OUTPATIENT)
Dept: PSYCHIATRY | Facility: CLINIC | Age: 66
End: 2021-12-13
Payer: MEDICARE

## 2021-12-15 ENCOUNTER — OFFICE VISIT (OUTPATIENT)
Dept: NEUROLOGY | Facility: CLINIC | Age: 66
End: 2021-12-15
Payer: MEDICARE

## 2021-12-15 VITALS
HEIGHT: 68 IN | DIASTOLIC BLOOD PRESSURE: 87 MMHG | HEART RATE: 75 BPM | WEIGHT: 189.13 LBS | SYSTOLIC BLOOD PRESSURE: 143 MMHG | BODY MASS INDEX: 28.66 KG/M2

## 2021-12-15 DIAGNOSIS — Z87.898 HISTORY OF SEIZURES: ICD-10-CM

## 2021-12-15 DIAGNOSIS — R51.9 NONINTRACTABLE EPISODIC HEADACHE, UNSPECIFIED HEADACHE TYPE: ICD-10-CM

## 2021-12-15 PROCEDURE — 99213 OFFICE O/P EST LOW 20 MIN: CPT | Mod: PBBFAC | Performed by: NEUROLOGICAL SURGERY

## 2021-12-15 PROCEDURE — 99999 PR PBB SHADOW E&M-EST. PATIENT-LVL III: CPT | Mod: PBBFAC,,, | Performed by: NEUROLOGICAL SURGERY

## 2021-12-15 PROCEDURE — 99999 PR PBB SHADOW E&M-EST. PATIENT-LVL III: ICD-10-PCS | Mod: PBBFAC,,, | Performed by: NEUROLOGICAL SURGERY

## 2021-12-15 PROCEDURE — 99204 OFFICE O/P NEW MOD 45 MIN: CPT | Mod: S$PBB,,, | Performed by: NEUROLOGICAL SURGERY

## 2021-12-15 PROCEDURE — 99204 PR OFFICE/OUTPT VISIT, NEW, LEVL IV, 45-59 MIN: ICD-10-PCS | Mod: S$PBB,,, | Performed by: NEUROLOGICAL SURGERY

## 2022-01-17 ENCOUNTER — PATIENT MESSAGE (OUTPATIENT)
Dept: NEUROLOGY | Facility: CLINIC | Age: 67
End: 2022-01-17
Payer: MEDICARE

## 2022-01-28 ENCOUNTER — IMMUNIZATION (OUTPATIENT)
Dept: OBSTETRICS AND GYNECOLOGY | Facility: CLINIC | Age: 67
End: 2022-01-28
Payer: MEDICARE

## 2022-01-28 ENCOUNTER — OFFICE VISIT (OUTPATIENT)
Dept: NEUROLOGY | Facility: CLINIC | Age: 67
End: 2022-01-28
Payer: MEDICARE

## 2022-01-28 VITALS
WEIGHT: 183.44 LBS | BODY MASS INDEX: 27.8 KG/M2 | DIASTOLIC BLOOD PRESSURE: 78 MMHG | HEART RATE: 77 BPM | HEIGHT: 68 IN | SYSTOLIC BLOOD PRESSURE: 142 MMHG

## 2022-01-28 DIAGNOSIS — Z23 NEED FOR VACCINATION: Primary | ICD-10-CM

## 2022-01-28 DIAGNOSIS — Z87.898 HISTORY OF SEIZURES: Primary | ICD-10-CM

## 2022-01-28 PROCEDURE — 91300 COVID-19, MRNA, LNP-S, PF, 30 MCG/0.3 ML DOSE VACCINE: CPT | Mod: PBBFAC

## 2022-01-28 PROCEDURE — 99213 OFFICE O/P EST LOW 20 MIN: CPT | Mod: PBBFAC | Performed by: NEUROLOGICAL SURGERY

## 2022-01-28 PROCEDURE — 99214 OFFICE O/P EST MOD 30 MIN: CPT | Mod: S$PBB,,, | Performed by: NEUROLOGICAL SURGERY

## 2022-01-28 PROCEDURE — 99214 PR OFFICE/OUTPT VISIT, EST, LEVL IV, 30-39 MIN: ICD-10-PCS | Mod: S$PBB,,, | Performed by: NEUROLOGICAL SURGERY

## 2022-01-28 PROCEDURE — 99999 PR PBB SHADOW E&M-EST. PATIENT-LVL III: CPT | Mod: PBBFAC,,, | Performed by: NEUROLOGICAL SURGERY

## 2022-01-28 PROCEDURE — 99999 PR PBB SHADOW E&M-EST. PATIENT-LVL III: ICD-10-PCS | Mod: PBBFAC,,, | Performed by: NEUROLOGICAL SURGERY

## 2022-01-28 RX ORDER — VALPROIC ACID 250 MG/1
250 CAPSULE, LIQUID FILLED ORAL 4 TIMES DAILY
Qty: 120 CAPSULE | Refills: 5 | Status: SHIPPED | OUTPATIENT
Start: 2022-01-28 | End: 2022-07-11

## 2022-01-28 NOTE — PROGRESS NOTES
Chief Complaint   Patient presents with    Head Injury        Anthony Joseph Rockweiler is a 67 y.o. male with a history of multiple medical diagnoses as listed below that presents to establish care for his history of a head injury. 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 He had multiple bone fractures and a head injury. He lost his sense of smell and taste and was unable to talk for quite some time. He says that he has been placed on seizure medication after the head injury, but he has been seizure free for quite some time. 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 He has been taking thyroid supplementation, but he feels that his medications have been needing some adjustments. Overall he feels that he has been much better but he still thinks there is some room for improvement in his quality of life overall.    Interval History  01/28/2022  He continues to try to keep himself physically active at the gym and to keep herself socially active as well in order to maintain his health.  He feels that he has been doing very well since he was last seen in clinic he has not had any new problems nor any new complaints.  Medications have been taking as directed without any complaints of side effects     PAST MEDICAL HISTORY:  Past Medical History:   Diagnosis Date    Headache     migraines    Hx of psychiatric care     Hypertension     Psychiatric problem     Seizures     Therapy        PAST SURGICAL HISTORY:  No past surgical history on file.    SOCIAL HISTORY:  Social History     Socioeconomic History    Marital status:     Number of children: 2   Tobacco Use    Smoking status: Never Smoker    Smokeless tobacco: Never Used   Substance and Sexual Activity    Alcohol use: No    Drug use: No    Sexual activity: Yes      Partners: Female     Birth control/protection: Condom       FAMILY HISTORY:  Family History   Problem Relation Age of Onset    Suicide Other     Dementia Neg Hx        ALLERGIES AND MEDICATIONS: updated and reviewed.  Review of patient's allergies indicates:   Allergen Reactions    Paroxetine      Urinary problems     Current Outpatient Medications   Medication Sig Dispense Refill    amLODIPine (NORVASC) 5 MG tablet Take 5 mg by mouth 2 (two) times daily.  3    carvedilol (COREG) 12.5 MG tablet Take 12.5 mg by mouth 2 (two) times daily with meals.      DULoxetine (CYMBALTA) 60 MG capsule Take 1 capsule (60 mg total) by mouth once daily. 90 capsule 1    ibuprofen (ADVIL,MOTRIN) 800 MG tablet TAKE 1 TABLET BY MOUTH THREE TIMES DAILY WITH FOOD  2    levothyroxine (SYNTHROID) 150 MCG tablet Take 150 mcg by mouth once daily.      LORazepam (ATIVAN) 1 MG tablet Take 1 tablet (1 mg total) by mouth 2 (two) times daily as needed for Anxiety. 60 tablet 5    losartan (COZAAR) 100 MG tablet Take 100 mg by mouth once daily.      pravastatin (PRAVACHOL) 20 MG tablet Take 20 mg by mouth once daily.      testosterone cypionate (DEPOTESTOTERONE CYPIONATE) 200 mg/mL injection INJECT 0.5 ML (CC) INTRAMUSCULARLY EVERY TWO WEEKS  5    valproic acid (DEPAKENE) 250 mg capsule Take 1 capsule (250 mg total) by mouth 4 (four) times daily. 120 capsule 5     No current facility-administered medications for this visit.       Review of Systems   Constitutional: Positive for unexpected weight change. Negative for activity change and fatigue.   HENT: Negative for trouble swallowing and voice change.    Eyes: Negative for photophobia, pain and visual disturbance.   Respiratory: Negative for apnea and shortness of breath.    Cardiovascular: Negative for chest pain and palpitations.   Gastrointestinal: Negative for constipation, nausea and vomiting.   Genitourinary: Negative for difficulty urinating.   Musculoskeletal: Negative for  arthralgias, back pain, gait problem, myalgias and neck pain.   Skin: Negative for color change and rash.   Neurological: Positive for headaches. Negative for dizziness, seizures, syncope, speech difficulty, weakness, light-headedness and numbness.   Psychiatric/Behavioral: Negative for agitation, behavioral problems and confusion.       Neurologic Exam     Mental Status   Oriented to person, place, and time.   Registration: recalls 3 of 3 objects.   Attention: normal. Concentration: normal.   Speech: speech is normal   Level of consciousness: alert  Knowledge: good.     Cranial Nerves     CN II   Right visual field deficit: none  Left visual field deficit: none     CN III, IV, VI   Pupils are equal, round, and reactive to light.  Extraocular motions are normal.   Right pupil: Size: 3 mm. Shape: regular.   Left pupil: Size: 3 mm. Shape: regular.   CN III: no CN III palsy  CN VI: no CN VI palsy  Nystagmus: none   Diplopia: none  Ophthalmoparesis: none  Upgaze: normal  Downgaze: normal  Conjugate gaze: present    CN VII   Facial expression full, symmetric.   Right facial weakness: none  Left facial weakness: none    CN VIII   CN VIII normal.     CN XI   CN XI normal.     CN XII   CN XII normal.   Tongue deviation: none    Motor Exam   Muscle bulk: normal  Overall muscle tone: normal  Right arm tone: normal  Left arm tone: normal  Right leg tone: normal  Left leg tone: normal    Gait, Coordination, and Reflexes     Gait  Gait: normal    Coordination   Finger to nose coordination: normal    Tremor   Resting tremor: absent      Physical Exam  Constitutional:       Appearance: He is well-developed.   HENT:      Head: Normocephalic and atraumatic.   Eyes:      Extraocular Movements: EOM normal.      Pupils: Pupils are equal, round, and reactive to light.   Pulmonary:      Effort: Pulmonary effort is normal. No respiratory distress.   Musculoskeletal:         General: Normal range of motion.   Neurological:      Mental  "Status: He is alert and oriented to person, place, and time.      Coordination: Finger-Nose-Finger Test normal.      Gait: Gait is intact.   Psychiatric:         Speech: Speech normal.         Behavior: Behavior normal.         Vitals:    01/28/22 0911   BP: (!) 142/78   Pulse: 77   Weight: 83.2 kg (183 lb 6.8 oz)   Height: 5' 8" (1.727 m)       Assessment & Plan:    Problem List Items Addressed This Visit     History of seizures - Primary    Overview     Likely provoked seizures from his history of traumatic head injury.  Patient was likely always in the antiepileptic medications to reduce the risk of recurrent seizures.  Discuss transitioning from valproic acid to topiramate to reduce the potential for drug drug interactions and undesired side effects including but not limited to weight gain.           Relevant Medications    valproic acid (DEPAKENE) 250 mg capsule          Follow-up: No follow-ups on file.    This note was done with the assistance of voice recognition software. Some errors may be present after proofreading.          "

## 2022-02-28 ENCOUNTER — OFFICE VISIT (OUTPATIENT)
Dept: PSYCHIATRY | Facility: CLINIC | Age: 67
End: 2022-02-28
Payer: MEDICARE

## 2022-02-28 VITALS
HEART RATE: 61 BPM | DIASTOLIC BLOOD PRESSURE: 78 MMHG | SYSTOLIC BLOOD PRESSURE: 138 MMHG | HEIGHT: 68 IN | BODY MASS INDEX: 27.76 KG/M2 | OXYGEN SATURATION: 97 % | WEIGHT: 183.19 LBS

## 2022-02-28 DIAGNOSIS — F41.1 GENERALIZED ANXIETY DISORDER: ICD-10-CM

## 2022-02-28 DIAGNOSIS — G93.9 PERSONALITY AND BEHAVIORAL DISORDERS DUE TO BRAIN DISEASE, DAMAGE, AND DYSFUNCTION: ICD-10-CM

## 2022-02-28 DIAGNOSIS — S06.9X9S TRAUMATIC BRAIN INJURY WITH LOSS OF CONSCIOUSNESS, SEQUELA: ICD-10-CM

## 2022-02-28 DIAGNOSIS — F06.30 MOOD DISORDER DUE TO A GENERAL MEDICAL CONDITION: Primary | ICD-10-CM

## 2022-02-28 DIAGNOSIS — F07.9 PERSONALITY AND BEHAVIORAL DISORDERS DUE TO BRAIN DISEASE, DAMAGE, AND DYSFUNCTION: ICD-10-CM

## 2022-02-28 PROCEDURE — 99213 OFFICE O/P EST LOW 20 MIN: CPT | Mod: PBBFAC | Performed by: PSYCHIATRY & NEUROLOGY

## 2022-02-28 PROCEDURE — 99213 OFFICE O/P EST LOW 20 MIN: CPT | Mod: S$PBB,,, | Performed by: PSYCHIATRY & NEUROLOGY

## 2022-02-28 PROCEDURE — 99213 PR OFFICE/OUTPT VISIT, EST, LEVL III, 20-29 MIN: ICD-10-PCS | Mod: S$PBB,,, | Performed by: PSYCHIATRY & NEUROLOGY

## 2022-02-28 PROCEDURE — 90833 PSYTX W PT W E/M 30 MIN: CPT | Mod: S$PBB,,, | Performed by: PSYCHIATRY & NEUROLOGY

## 2022-02-28 PROCEDURE — 90833 PR PSYCHOTHERAPY W/PATIENT W/E&M, 30 MIN (ADD ON): ICD-10-PCS | Mod: S$PBB,,, | Performed by: PSYCHIATRY & NEUROLOGY

## 2022-02-28 PROCEDURE — 99999 PR PBB SHADOW E&M-EST. PATIENT-LVL III: CPT | Mod: PBBFAC,,, | Performed by: PSYCHIATRY & NEUROLOGY

## 2022-02-28 PROCEDURE — 99999 PR PBB SHADOW E&M-EST. PATIENT-LVL III: ICD-10-PCS | Mod: PBBFAC,,, | Performed by: PSYCHIATRY & NEUROLOGY

## 2022-02-28 RX ORDER — LORAZEPAM 1 MG/1
1 TABLET ORAL 2 TIMES DAILY PRN
Qty: 60 TABLET | Refills: 5 | Status: SHIPPED | OUTPATIENT
Start: 2022-02-28 | End: 2022-05-27 | Stop reason: SDUPTHER

## 2022-02-28 RX ORDER — DULOXETIN HYDROCHLORIDE 60 MG/1
60 CAPSULE, DELAYED RELEASE ORAL DAILY
Qty: 90 CAPSULE | Refills: 1 | Status: SHIPPED | OUTPATIENT
Start: 2022-02-28 | End: 2022-05-27 | Stop reason: SDUPTHER

## 2022-02-28 NOTE — PROGRESS NOTES
"Outpatient Psychiatry Follow-Up Visit (MD/NP)    2/28/2022    Clinical Status of Patient:  Outpatient (Ambulatory)    Chief Complaint:  Anthony Joseph Rockweiler is a 66 y.o. male who presents today for follow-up of depression and anxiety.  Met with patient.      Interval History and Content of Current Session:  Interim Events/Subjective Report/Content of Current Session:  Patient Anthony Joseph Rockweiler presents for follow-up.   What is scheduled as a 30 min appointment actually takes 50 min of time due to counseling and education.  Again very long-winded and almost uninterruptible in conversation.  He starts to walk into the wrong room, although he has been here many times in the past.  He also starts to sit in the wrong chair.  He continues to have many grandiose thoughts and is very talkative.  States that he has a developed a 6th sense of reading people.  Also says that most women that look at him, they do so in a sexual manner.  He does mention that a lady didn't want to get on the elevator with him today and he is not sure why, because he is "good looking".  When he talks about women, he always starts out describing their looks and if they are good looking.  He feels that his anxiety and depression are stable.  He continues to take his medications and uses lorazepam twice daily.  Asking to continue his medications.  No side effects noted or endorsed.     Psychotherapy:  · Target symptoms: depression, anxiety   · Why chosen therapy is appropriate versus another modality: evidence based practice  · Outcome monitoring methods: self-report, observation  · Therapeutic intervention type: supportive psychotherapy  · Topics discussed/themes: relationships difficulties, stress related to medical comorbidities, building skills sets for symptom management, symptom recognition  · The patient's response to the intervention is accepting. The patient's progress toward treatment goals is poor.   · Duration of intervention: " "30 minutes.    Review of Systems   · PSYCHIATRIC: Pertinant items are noted in the narrative.  · CONSTITUTIONAL: No weight gain or loss.   · MUSCULOSKELETAL: Positive for pain.  · NEUROLOGIC: No weakness, sensory changes, seizures, confusion, memory loss, tremor or other abnormal movements.  · RESPIRATORY: No shortness of breath.  · CARDIOVASCULAR: No tachycardia or chest pain.  · GASTROINTESTINAL: No nausea, vomiting, pain, constipation or diarrhea.   · Tinnitus - chronic  · Headache today    Past Medical, Family and Social History: The patient's past medical, family and social history have been reviewed and updated as appropriate within the electronic medical record - see encounter notes.    Compliance: yes    Side effects: None    Risk Parameters:  Patient reports no suicidal ideation  Patient reports no homicidal ideation  Patient reports no self-injurious behavior  Patient reports no violent behavior    Exam (detailed: at least 9 elements; comprehensive: all 15 elements)   Constitutional  Vitals:  Most recent vital signs, dated less than 90 days prior to this appointment, were reviewed.   Vitals:    02/28/22 0754   BP: 138/78   Pulse: 61   SpO2: 97%   Weight: 83.1 kg (183 lb 3.2 oz)   Height: 5' 8" (1.727 m)        General:  unremarkable, age appropriate     Musculoskeletal  Muscle Strength/Tone:  no tremor, no tic   Gait & Station:  non-ataxic     Psychiatric  Speech:  no latency; no press   Mood & Affect:  anxious, dysthymic  expansive   Thought Process:  perseverative   Associations:  tangential   Thought Content:  normal, no suicidality, no homicidality, delusions, or paranoia   Insight:  limited awareness of illness   Judgement: limited   Orientation:  person, place, situation, time/date   Memory: intact for content of interview   Language: able to name, able to repeat   Attention Span & Concentration:  distracted   Fund of Knowledge:  intact and appropriate to age and level of education     Assessment and " Diagnosis   Status/Progress: Based on the examination today, the patient's problem(s) is/are adequately but not ideally controlled.  New problems have been presented today.   Co-morbidities are complicating management of the primary condition.  There are no active rule-out diagnoses for this patient at this time.     General Impression: We will continue pharmacological intervention and adjunctive therapy.       ICD-10-CM ICD-9-CM   1. Mood disorder due to a general medical condition  F06.30 293.83   2. Traumatic brain injury with loss of consciousness, sequela  S06.9X9S 907.0   3. Generalized anxiety disorder  F41.1 300.02   4. 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 The risks and benefits of medication were discussed with the patient.  · Counseling provided with patient as follows: importance of compliance with chosen treatment options was emphasized, risks and benefits of treatment options, including medications, were discussed with the patient, risk factor reduction, prognosis, patient education, instructions for  management, treatment and follow-up were reviewed  1.  Continue Depakote as per neurologist 250 mg in the morning and 500 mg nightly targeting seizures.  This is also an help with his mood stabilization.  2.  Educated patient about lorazepam taper.  Agree with temporary use of lorazepam 1 mg twice daily as needed for anxiety.  We will do this for the next couple of months then decrease again with a taper off of the medicine.  Warned of risk of oversedation, falls, and not to drink or drive until the effects of the medication are known.  Warned of risk of addictive and withdrawal potential.  Warned patient to keep medications in pill bottle and not to carry loosely.  3.  Educated patient that he needs intensive individual therapy as well  as couples therapy but he is not interested in either at this time.  4.   Will need to watch patient because he has addictive and manipulative behaviors.  He has potential for impulsive actions and drug overuse because of the traumatic brain injury.  5.  May consider augmentation with lamotrigine or topiramate in the future to help as headache prevention medicine and mood stabilizer but he is hesitant to medication changes.  6.  Educated patient that his heavy use of Excedrin and ibuprofen is likely causing rebound headaches and tinnitus but he disagrees.    7.  This is a difficult case.    8.  Continue Cymbalta 60 mg daily targeting depression, anxiety, chronic pains.  Warned of risk of indio, suicidality, serotonin syndrome.  This may off label help with some weight loss.  9.  A lot of attention seeking in grandiose characteristics which is highly indicative of a personality disorder, likely due to the head injury.  He is very narcissistic.  This makes him a very difficult person to interact with on a personal level.  He needs intense therapy better understanding, insight, and guidance but is not open to going to an individual therapist.  Very little insight into how the head injury has likely changed his way of thinking.    Return to Clinic: 3 months, as needed

## 2022-02-28 NOTE — PATIENT INSTRUCTIONS
"        You have been provided with a certain amount of medication with a specified number of refills.  Please follow up within an adequate time before you run out of medications.    REFILLS FOR CONTROLLED SUBSTANCES WILL NOT BE GIVEN WITHOUT AN APPOINTMENT.  I will not honor or fill automated refill requests from pharmacies.  You must come in for an appointment to get refills.        Please book your next appointment for myself or therapist by phone by calling our office at [phone].        Note that follow up appointments are 10-20 minutes long.  It is important that we focus on medication management.  Should you need therapy, please get set up with our therapist or call your insurance company to find out which therapists are available in your area.      PLEASE BE AT LEAST 15 MINUTES EARLY FOR YOUR NEXT APPOINTMENT.  Late arrivals WILL BE TURNED AWAY AND ASKED TO RESCHEDULE.  YOU MUST COME EARLY TO ALLOW TIME FOR CHECK-IN AS WELL AS GET YOUR VITAL SIGNS AND GO OVER YOUR MEDICATIONS.  [facility] is not fair to the patients who present after you and are on time for their appointments.  It causes a delay in the appointments for patients and staff.  YOU MAY ALSO BE DISCHARGED FROM CLINIC with multiple late arrivals, late cancellations, or "No Show" appointments.       -----------------------------------------------------------------------------------------------------------------  IF YOU FEEL SUICIDAL OR HAVING THOUGHTS OR PLANS TO HURT YOURSELF OR OTHERS, CALL 911 OR REPORT TO THE NEAREST EMERGENCY ROOM.  YOU CAN ALSO ACCESS THE FOLLOWING HOTLINE:    National Suicide Prevention Hotline Number 1-083-397-TALK (1742)                  "

## 2022-04-29 ENCOUNTER — OFFICE VISIT (OUTPATIENT)
Dept: NEUROLOGY | Facility: CLINIC | Age: 67
End: 2022-04-29
Payer: MEDICARE

## 2022-04-29 VITALS
DIASTOLIC BLOOD PRESSURE: 85 MMHG | SYSTOLIC BLOOD PRESSURE: 141 MMHG | WEIGHT: 183.19 LBS | BODY MASS INDEX: 27.76 KG/M2 | HEART RATE: 77 BPM | HEIGHT: 68 IN

## 2022-04-29 DIAGNOSIS — R51.9 NONINTRACTABLE EPISODIC HEADACHE, UNSPECIFIED HEADACHE TYPE: ICD-10-CM

## 2022-04-29 DIAGNOSIS — Z87.898 HISTORY OF SEIZURES: Primary | ICD-10-CM

## 2022-04-29 PROCEDURE — 99214 OFFICE O/P EST MOD 30 MIN: CPT | Mod: S$PBB,,, | Performed by: NEUROLOGICAL SURGERY

## 2022-04-29 PROCEDURE — 99999 PR PBB SHADOW E&M-EST. PATIENT-LVL III: ICD-10-PCS | Mod: PBBFAC,,, | Performed by: NEUROLOGICAL SURGERY

## 2022-04-29 PROCEDURE — 99214 PR OFFICE/OUTPT VISIT, EST, LEVL IV, 30-39 MIN: ICD-10-PCS | Mod: S$PBB,,, | Performed by: NEUROLOGICAL SURGERY

## 2022-04-29 PROCEDURE — 99213 OFFICE O/P EST LOW 20 MIN: CPT | Mod: PBBFAC | Performed by: NEUROLOGICAL SURGERY

## 2022-04-29 PROCEDURE — 99999 PR PBB SHADOW E&M-EST. PATIENT-LVL III: CPT | Mod: PBBFAC,,, | Performed by: NEUROLOGICAL SURGERY

## 2022-04-29 RX ORDER — IBUPROFEN 800 MG/1
TABLET ORAL
Qty: 90 TABLET | Refills: 2 | Status: SHIPPED | OUTPATIENT
Start: 2022-04-29 | End: 2022-10-19

## 2022-04-29 NOTE — PROGRESS NOTES
Chief Complaint   Patient presents with    Seizures        Anthony Joseph Rockweiler is a 67 y.o. male with a history of multiple medical diagnoses as listed below that presents to establish care for his history of a head injury. 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 He had multiple bone fractures and a head injury. He lost his sense of smell and taste and was unable to talk for quite some time. He says that he has been placed on seizure medication after the head injury, but he has been seizure free for quite some time. 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 He has been taking thyroid supplementation, but he feels that his medications have been needing some adjustments. Overall he feels that he has been much better but he still thinks there is some room for improvement in his quality of life overall.    Interval History  01/28/2022  He continues to try to keep himself physically active at the gym and to keep herself socially active as well in order to maintain his health.  He feels that he has been doing very well since he was last seen in clinic he has not had any new problems nor any new complaints.  Medications have been taking as directed without any complaints of side effects.    04/29/2022  He overall has been doing well and continues to try to focus on eating well and keeping himself physically active to keep himself well. He has been increasingly considering travelling abroad, but is worried about maintaining his communication with his providers during that time.     PAST MEDICAL HISTORY:  Past Medical History:   Diagnosis Date    Headache     migraines    Hx of psychiatric care     Hypertension     Psychiatric problem     Seizures     Therapy        PAST SURGICAL HISTORY:  No past surgical history on  file.    SOCIAL HISTORY:  Social History     Socioeconomic History    Marital status:     Number of children: 2   Tobacco Use    Smoking status: Never Smoker    Smokeless tobacco: Never Used   Substance and Sexual Activity    Alcohol use: No    Drug use: No    Sexual activity: Yes     Partners: Female     Birth control/protection: Condom       FAMILY HISTORY:  Family History   Problem Relation Age of Onset    Suicide Other     Dementia Neg Hx        ALLERGIES AND MEDICATIONS: updated and reviewed.  Review of patient's allergies indicates:   Allergen Reactions    Paroxetine      Urinary problems     Current Outpatient Medications   Medication Sig Dispense Refill    amLODIPine (NORVASC) 5 MG tablet Take 5 mg by mouth 2 (two) times daily.  3    carvedilol (COREG) 12.5 MG tablet Take 12.5 mg by mouth 2 (two) times daily with meals.      DULoxetine (CYMBALTA) 60 MG capsule Take 1 capsule (60 mg total) by mouth once daily. 90 capsule 1    ibuprofen (ADVIL,MOTRIN) 800 MG tablet TAKE 1 TABLET BY MOUTH THREE TIMES DAILY AS NEEDED WITH FOOD 90 tablet 2    levothyroxine (SYNTHROID) 150 MCG tablet Take 150 mcg by mouth once daily.      LORazepam (ATIVAN) 1 MG tablet Take 1 tablet (1 mg total) by mouth 2 (two) times daily as needed for Anxiety. 60 tablet 5    losartan (COZAAR) 100 MG tablet Take 100 mg by mouth once daily.      pravastatin (PRAVACHOL) 20 MG tablet Take 20 mg by mouth once daily.      testosterone cypionate (DEPOTESTOTERONE CYPIONATE) 200 mg/mL injection INJECT 0.5 ML (CC) INTRAMUSCULARLY EVERY TWO WEEKS  5    valproic acid (DEPAKENE) 250 mg capsule Take 1 capsule (250 mg total) by mouth 4 (four) times daily. 120 capsule 5     No current facility-administered medications for this visit.       Review of Systems   Constitutional: Positive for unexpected weight change. Negative for activity change and fatigue.   HENT: Negative for trouble swallowing and voice change.    Eyes: Negative for  photophobia, pain and visual disturbance.   Respiratory: Negative for apnea and shortness of breath.    Cardiovascular: Negative for chest pain and palpitations.   Gastrointestinal: Negative for constipation, nausea and vomiting.   Genitourinary: Negative for difficulty urinating.   Musculoskeletal: Negative for arthralgias, back pain, gait problem, myalgias and neck pain.   Skin: Negative for color change and rash.   Neurological: Positive for headaches. Negative for dizziness, seizures, syncope, speech difficulty, weakness, light-headedness and numbness.   Psychiatric/Behavioral: Negative for agitation, behavioral problems and confusion.       Neurologic Exam     Mental Status   Oriented to person, place, and time.   Registration: recalls 3 of 3 objects.   Attention: normal. Concentration: normal.   Speech: speech is normal   Level of consciousness: alert  Knowledge: good.     Cranial Nerves     CN II   Right visual field deficit: none  Left visual field deficit: none     CN III, IV, VI   Pupils are equal, round, and reactive to light.  Extraocular motions are normal.   Right pupil: Size: 3 mm. Shape: regular.   Left pupil: Size: 3 mm. Shape: regular.   CN III: no CN III palsy  CN VI: no CN VI palsy  Nystagmus: none   Diplopia: none  Ophthalmoparesis: none  Upgaze: normal  Downgaze: normal  Conjugate gaze: present    CN VII   Facial expression full, symmetric.   Right facial weakness: none  Left facial weakness: none    CN VIII   CN VIII normal.     CN XI   CN XI normal.     CN XII   CN XII normal.   Tongue deviation: none    Motor Exam   Muscle bulk: normal  Overall muscle tone: normal  Right arm tone: normal  Left arm tone: normal  Right leg tone: normal  Left leg tone: normal    Gait, Coordination, and Reflexes     Gait  Gait: normal    Coordination   Finger to nose coordination: normal    Tremor   Resting tremor: absent      Physical Exam  Constitutional:       Appearance: He is well-developed.   HENT:       "Head: Normocephalic and atraumatic.   Eyes:      Extraocular Movements: EOM normal.      Pupils: Pupils are equal, round, and reactive to light.   Pulmonary:      Effort: Pulmonary effort is normal. No respiratory distress.   Musculoskeletal:         General: Normal range of motion.   Neurological:      Mental Status: He is alert and oriented to person, place, and time.      Coordination: Finger-Nose-Finger Test normal.      Gait: Gait is intact.   Psychiatric:         Speech: Speech normal.         Behavior: Behavior normal.         Vitals:    04/29/22 0908   BP: (!) 141/85   Pulse: 77   Weight: 83.1 kg (183 lb 3.2 oz)   Height: 5' 8" (1.727 m)       Assessment & Plan:    Problem List Items Addressed This Visit     History of seizures - Primary    Overview     Likely provoked seizures from his history of traumatic head injury.  Patient was likely always in the antiepileptic medications to reduce the risk of recurrent seizures.  Discuss transitioning from valproic acid to topiramate to reduce the potential for drug drug interactions and undesired side effects including but not limited to weight gain.           Headache    Overview     Posttraumatic headaches.  Currently helped with his current dose of valproic acid.  Discussed transitioning to topiramate           Relevant Medications    ibuprofen (ADVIL,MOTRIN) 800 MG tablet          Follow-up: No follow-ups on file.    This note was done with the assistance of voice recognition software. Some errors may be present after proofreading.            "

## 2022-05-27 ENCOUNTER — OFFICE VISIT (OUTPATIENT)
Dept: PSYCHIATRY | Facility: CLINIC | Age: 67
End: 2022-05-27
Payer: MEDICARE

## 2022-05-27 VITALS
BODY MASS INDEX: 27.85 KG/M2 | OXYGEN SATURATION: 97 % | SYSTOLIC BLOOD PRESSURE: 143 MMHG | HEART RATE: 72 BPM | HEIGHT: 68 IN | DIASTOLIC BLOOD PRESSURE: 93 MMHG | WEIGHT: 183.75 LBS

## 2022-05-27 DIAGNOSIS — F07.9 PERSONALITY AND BEHAVIORAL DISORDERS DUE TO BRAIN DISEASE, DAMAGE, AND DYSFUNCTION: ICD-10-CM

## 2022-05-27 DIAGNOSIS — G93.9 PERSONALITY AND BEHAVIORAL DISORDERS DUE TO BRAIN DISEASE, DAMAGE, AND DYSFUNCTION: ICD-10-CM

## 2022-05-27 DIAGNOSIS — S06.9X9S TRAUMATIC BRAIN INJURY WITH LOSS OF CONSCIOUSNESS, SEQUELA: ICD-10-CM

## 2022-05-27 DIAGNOSIS — F41.1 GENERALIZED ANXIETY DISORDER: ICD-10-CM

## 2022-05-27 DIAGNOSIS — F13.20 MODERATE BENZODIAZEPINE USE DISORDER: ICD-10-CM

## 2022-05-27 DIAGNOSIS — F06.30 MOOD DISORDER DUE TO A GENERAL MEDICAL CONDITION: Primary | ICD-10-CM

## 2022-05-27 PROCEDURE — 99999 PR PBB SHADOW E&M-EST. PATIENT-LVL III: CPT | Mod: PBBFAC,,, | Performed by: PSYCHIATRY & NEUROLOGY

## 2022-05-27 PROCEDURE — 99213 OFFICE O/P EST LOW 20 MIN: CPT | Mod: PBBFAC | Performed by: PSYCHIATRY & NEUROLOGY

## 2022-05-27 PROCEDURE — 99214 PR OFFICE/OUTPT VISIT, EST, LEVL IV, 30-39 MIN: ICD-10-PCS | Mod: S$PBB,,, | Performed by: PSYCHIATRY & NEUROLOGY

## 2022-05-27 PROCEDURE — 90833 PSYTX W PT W E/M 30 MIN: CPT | Mod: S$PBB,,, | Performed by: PSYCHIATRY & NEUROLOGY

## 2022-05-27 PROCEDURE — 99214 OFFICE O/P EST MOD 30 MIN: CPT | Mod: S$PBB,,, | Performed by: PSYCHIATRY & NEUROLOGY

## 2022-05-27 PROCEDURE — 99999 PR PBB SHADOW E&M-EST. PATIENT-LVL III: ICD-10-PCS | Mod: PBBFAC,,, | Performed by: PSYCHIATRY & NEUROLOGY

## 2022-05-27 PROCEDURE — 90833 PR PSYCHOTHERAPY W/PATIENT W/E&M, 30 MIN (ADD ON): ICD-10-PCS | Mod: S$PBB,,, | Performed by: PSYCHIATRY & NEUROLOGY

## 2022-05-27 RX ORDER — LORAZEPAM 1 MG/1
1 TABLET ORAL 2 TIMES DAILY PRN
Qty: 60 TABLET | Refills: 5 | Status: SHIPPED | OUTPATIENT
Start: 2022-05-27 | End: 2022-11-23 | Stop reason: SDUPTHER

## 2022-05-27 RX ORDER — DULOXETIN HYDROCHLORIDE 60 MG/1
60 CAPSULE, DELAYED RELEASE ORAL DAILY
Qty: 90 CAPSULE | Refills: 1 | Status: SHIPPED | OUTPATIENT
Start: 2022-05-27 | End: 2022-11-23 | Stop reason: SDUPTHER

## 2022-05-27 RX ORDER — ONDANSETRON 4 MG/1
4 TABLET, ORALLY DISINTEGRATING ORAL EVERY 12 HOURS PRN
Qty: 30 TABLET | Refills: 1 | Status: SHIPPED | OUTPATIENT
Start: 2022-05-27 | End: 2023-12-05 | Stop reason: CLARIF

## 2022-05-27 NOTE — PROGRESS NOTES
"Outpatient Psychiatry Follow-Up Visit (MD/NP)    2022    Clinical Status of Patient:  Outpatient (Ambulatory)    Chief Complaint:  Anthony Joseph Rockweiler is a 67 y.o. male who presents today for follow-up of depression and anxiety.  Met with patient.      Interval History and Content of Current Session:  Interim Events/Subjective Report/Content of Current Session:  Patient Anthony Joseph Rockweiler presents for follow-up.   What is scheduled as a 30 min appointment actually takes 50 min of time due to counseling and education.  Again very long-winded and almost uninterruptible in conversation.  He spends a lot of time talking about the death of an actor who is about his age.  Says that he doesn't feel old and wonders why the actor , so that he can prevent any harm to himself.  Again mentions that people come to him for advice because they see that he knows "a lot more than the average person about things".  Says that he and wife are arguing over bills.  He is holding off of paying some of the the home loans so that he can get some sort of govt assistance but wife does not feel that it is a good idea.  He also talks a lot about women and says that the mortgage officer had a nice voice so he asked her her age and weight to make sure that she was "taking care of herself".  Not sleeping well and having daily headaches.  Chronic ringing in his ears.  Says that he takes ibuprofen 800 mg daily and doesn't want to stop.  Went to a doctor for calf pain and "laughed" when he was prescribed ibuprofen 600 mg.  Again mentions that felt his best when he took higher doses of testosterone and thyroid medications and wants to do this again.  Taking duloxetine without complaint.  Says that he takes the lorazepam twice daily and reluctant to decrease/stop.  Says that he has gone down enough from 6 mg daily and any lower dose may increase his tinnitus.  Not very open to change and meets with reluctance.  Asks for a " "prescription of Zofran if he is to cut back on meds.    Psychotherapy:  · Target symptoms: depression, anxiety   · Why chosen therapy is appropriate versus another modality: evidence based practice  · Outcome monitoring methods: self-report, observation  · Therapeutic intervention type: supportive psychotherapy  · Topics discussed/themes: relationships difficulties, stress related to medical comorbidities, building skills sets for symptom management, symptom recognition  · The patient's response to the intervention is reluctant. The patient's progress toward treatment goals is poor.   · Duration of intervention: 30 minutes.    Review of Systems   · PSYCHIATRIC: Pertinant items are noted in the narrative.  · CONSTITUTIONAL: No weight gain or loss.   · MUSCULOSKELETAL: Positive for pain.  · NEUROLOGIC: No weakness, sensory changes, seizures, confusion, memory loss, tremor or other abnormal movements.  · RESPIRATORY: No shortness of breath.  · CARDIOVASCULAR: No tachycardia or chest pain.  · GASTROINTESTINAL: No nausea, vomiting, pain, constipation or diarrhea.   · Tinnitus - chronic  · Headache today    Past Medical, Family and Social History: The patient's past medical, family and social history have been reviewed and updated as appropriate within the electronic medical record - see encounter notes.    Compliance: yes    Side effects: None    Risk Parameters:  Patient reports no suicidal ideation  Patient reports no homicidal ideation  Patient reports no self-injurious behavior  Patient reports no violent behavior    Exam (detailed: at least 9 elements; comprehensive: all 15 elements)   Constitutional  Vitals:  Most recent vital signs, dated less than 90 days prior to this appointment, were reviewed.   Vitals:    05/27/22 0835   BP: (!) 143/93   Pulse: 72   SpO2: 97%   Weight: 83.3 kg (183 lb 12.1 oz)   Height: 5' 8" (1.727 m)        General:  unremarkable, age appropriate     Musculoskeletal  Muscle Strength/Tone:  " no tremor, no tic   Gait & Station:  non-ataxic     Psychiatric  Speech:  no latency; no press   Mood & Affect:  anxious, dysthymic  expansive   Thought Process:  perseverative   Associations:  tangential   Thought Content:  normal, no suicidality, no homicidality, delusions, or paranoia   Insight:  limited awareness of illness   Judgement: limited   Orientation:  person, place, situation, time/date   Memory: intact for content of interview   Language: able to name, able to repeat   Attention Span & Concentration:  distracted   Fund of Knowledge:  intact and appropriate to age and level of education     Assessment and Diagnosis   Status/Progress: Based on the examination today, the patient's problem(s) is/are adequately but not ideally controlled.  New problems have been presented today.   Co-morbidities are complicating management of the primary condition.  There are no active rule-out diagnoses for this patient at this time.     General Impression: We will continue pharmacological intervention and adjunctive therapy.       ICD-10-CM ICD-9-CM   1. Mood disorder due to a general medical condition  F06.30 293.83   2. Traumatic brain injury with loss of consciousness, sequela  S06.9X9S 907.0   3. Generalized anxiety disorder  F41.1 300.02   4. 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 Moderate benzodiazepine use disorder  F13.20 305.40       Intervention/Counseling/Treatment Plan   · Medication Management: Continue current medications. The risks and benefits of medication were discussed with the patient.  · Counseling provided with patient as follows: importance of compliance with chosen treatment options was emphasized, risks and benefits of treatment options, including medications, were discussed with the patient, risk factor reduction, prognosis, patient education, instructions for  management, treatment and follow-up were reviewed  1.  Continue  Depakote as per neurologist 250 mg in the morning and 500 mg nightly targeting seizures.  This is also an help with his mood stabilization.  2.  Educated patient about lorazepam taper.  Agree with temporary use of lorazepam 1 mg twice daily as needed for anxiety.  We will do this for the next couple of months then decrease again with a taper off of the medicine.  Warned of risk of oversedation, falls, and not to drink or drive until the effects of the medication are known.  Warned of risk of addictive and withdrawal potential.  Warned patient to keep medications in pill bottle and not to carry loosely.  3.  Educated patient that he needs intensive individual therapy as well as couples therapy but he is not interested in either at this time.  4.   Will need to watch patient because he has addictive and manipulative behaviors.  He has potential for impulsive actions and drug overuse because of the traumatic brain injury.  5.  May consider augmentation with lamotrigine or topiramate in the future to help as headache prevention medicine and mood stabilizer but he is hesitant to medication changes.  6.  Educated patient that his heavy use of Excedrin and ibuprofen is likely causing rebound headaches and tinnitus but he disagrees.    7.  This is a difficult case.    8.  Continue Cymbalta 60 mg daily targeting depression, anxiety, chronic pains.  Warned of risk of indio, suicidality, serotonin syndrome.  This may off label help with some weight loss.  9.  A lot of attention seeking in grandiose characteristics which is highly indicative of a personality disorder, likely due to the head injury.  He is very narcissistic.  This makes him a very difficult person to interact with on a personal level.  He needs intense therapy better understanding, insight, and guidance but is not open to going to an individual therapist.  Very little insight into how the head injury has likely changed his way of thinking.  I read his book which  has a lot of narcissistic traits within.    Return to Clinic: 3 months, as needed

## 2022-05-27 NOTE — PATIENT INSTRUCTIONS
"        You have been provided with a certain amount of medication with a specified number of refills.  Please follow up within an adequate time before you run out of medications.    REFILLS FOR CONTROLLED SUBSTANCES WILL NOT BE GIVEN WITHOUT AN APPOINTMENT.  I will not honor or fill automated refill requests from pharmacies.  You must come in for an appointment to get refills.        Please book your next appointment for myself or therapist by phone by calling our office at [phone].        Note that follow up appointments are 10-20 minutes long.  It is important that we focus on medication management.  Should you need therapy, please get set up with our therapist or call your insurance company to find out which therapists are available in your area.      PLEASE BE AT LEAST 15 MINUTES EARLY FOR YOUR NEXT APPOINTMENT.  Late arrivals WILL BE TURNED AWAY AND ASKED TO RESCHEDULE.  YOU MUST COME EARLY TO ALLOW TIME FOR CHECK-IN AS WELL AS GET YOUR VITAL SIGNS AND GO OVER YOUR MEDICATIONS.  [facility] is not fair to the patients who present after you and are on time for their appointments.  It causes a delay in the appointments for patients and staff.  YOU MAY ALSO BE DISCHARGED FROM CLINIC with multiple late arrivals, late cancellations, or "No Show" appointments.       -----------------------------------------------------------------------------------------------------------------  IF YOU FEEL SUICIDAL OR HAVING THOUGHTS OR PLANS TO HURT YOURSELF OR OTHERS, CALL 911 OR REPORT TO THE NEAREST EMERGENCY ROOM.  YOU CAN ALSO ACCESS THE FOLLOWING HOTLINE:    National Suicide Prevention Hotline Number 8-801-097-TALK (3713)                  "

## 2022-07-07 ENCOUNTER — TELEPHONE (OUTPATIENT)
Dept: PSYCHIATRY | Facility: CLINIC | Age: 67
End: 2022-07-07
Payer: MEDICARE

## 2022-07-07 NOTE — TELEPHONE ENCOUNTER
Called and left a vm letting him know he has a 6 month recall a will not need to make appointment at this time.

## 2022-08-09 ENCOUNTER — OFFICE VISIT (OUTPATIENT)
Dept: NEUROLOGY | Facility: CLINIC | Age: 67
End: 2022-08-09
Payer: MEDICARE

## 2022-08-09 VITALS
SYSTOLIC BLOOD PRESSURE: 127 MMHG | WEIGHT: 180.56 LBS | BODY MASS INDEX: 27.36 KG/M2 | HEART RATE: 69 BPM | DIASTOLIC BLOOD PRESSURE: 81 MMHG | HEIGHT: 68 IN

## 2022-08-09 DIAGNOSIS — Z87.898 HISTORY OF SEIZURES: ICD-10-CM

## 2022-08-09 DIAGNOSIS — R51.9 NONINTRACTABLE EPISODIC HEADACHE, UNSPECIFIED HEADACHE TYPE: ICD-10-CM

## 2022-08-09 DIAGNOSIS — R53.83 FATIGUE, UNSPECIFIED TYPE: Primary | ICD-10-CM

## 2022-08-09 PROCEDURE — 99499 UNLISTED E&M SERVICE: CPT | Mod: S$PBB,,, | Performed by: NEUROLOGICAL SURGERY

## 2022-08-09 PROCEDURE — 99999 PR PBB SHADOW E&M-EST. PATIENT-LVL III: CPT | Mod: PBBFAC,,, | Performed by: NEUROLOGICAL SURGERY

## 2022-08-09 PROCEDURE — 99999 PR PBB SHADOW E&M-EST. PATIENT-LVL III: ICD-10-PCS | Mod: PBBFAC,,, | Performed by: NEUROLOGICAL SURGERY

## 2022-08-09 PROCEDURE — 99214 PR OFFICE/OUTPT VISIT, EST, LEVL IV, 30-39 MIN: ICD-10-PCS | Mod: S$PBB,,, | Performed by: NEUROLOGICAL SURGERY

## 2022-08-09 PROCEDURE — 99214 OFFICE O/P EST MOD 30 MIN: CPT | Mod: S$PBB,,, | Performed by: NEUROLOGICAL SURGERY

## 2022-08-09 PROCEDURE — 99499 RISK ADDL DX/OHS AUDIT: ICD-10-PCS | Mod: S$PBB,,, | Performed by: NEUROLOGICAL SURGERY

## 2022-08-09 PROCEDURE — 99213 OFFICE O/P EST LOW 20 MIN: CPT | Mod: PBBFAC | Performed by: NEUROLOGICAL SURGERY

## 2022-08-09 NOTE — PROGRESS NOTES
Chief Complaint   Patient presents with    Seizures        Anthony Joseph Rockweiler is a 67 y.o. male with a history of multiple medical diagnoses as listed below that presents to establish care for his history of a head injury. 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 He had multiple bone fractures and a head injury. He lost his sense of smell and taste and was unable to talk for quite some time. He says that he has been placed on seizure medication after the head injury, but he has been seizure free for quite some time. 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 He has been taking thyroid supplementation, but he feels that his medications have been needing some adjustments. Overall he feels that he has been much better but he still thinks there is some room for improvement in his quality of life overall.    Interval History  01/28/2022  He continues to try to keep himself physically active at the gym and to keep herself socially active as well in order to maintain his health.  He feels that he has been doing very well since he was last seen in clinic he has not had any new problems nor any new complaints.  Medications have been taking as directed without any complaints of side effects.    04/29/2022  He overall has been doing well and continues to try to focus on eating well and keeping himself physically active to keep himself well. He has been increasingly considering travelling abroad, but is worried about maintaining his communication with his providers during that time.    08/09/2022  He endorses a significant amount of psychosocial stressors since his last visit.  He said that he and his wife have had some disagreements about the care his wife's healing parents.  He says that he has been having some difficulty with  sleeping.  He was sleep in only be able to stay asleep for about an hour sometimes.  Other times he says that he sleeps and seems to drift off very quickly as he will start to have dreams which will wake him up from his sleep spontaneously.  He has not had any difficulty with initiating sleep only with maintenance.  He says that he does doze off frequently throughout day at unexpected and unwanted time..     PAST MEDICAL HISTORY:  Past Medical History:   Diagnosis Date    Headache     migraines    Hx of psychiatric care     Hypertension     Psychiatric problem     Seizures     Therapy        PAST SURGICAL HISTORY:  No past surgical history on file.    SOCIAL HISTORY:  Social History     Socioeconomic History    Marital status:     Number of children: 2   Tobacco Use    Smoking status: Never Smoker    Smokeless tobacco: Never Used   Substance and Sexual Activity    Alcohol use: No    Drug use: No    Sexual activity: Yes     Partners: Female     Birth control/protection: Condom       FAMILY HISTORY:  Family History   Problem Relation Age of Onset    Suicide Other     Dementia Neg Hx        ALLERGIES AND MEDICATIONS: updated and reviewed.  Review of patient's allergies indicates:   Allergen Reactions    Paroxetine      Urinary problems     Current Outpatient Medications   Medication Sig Dispense Refill    amLODIPine (NORVASC) 5 MG tablet Take 5 mg by mouth 2 (two) times daily.  3    carvedilol (COREG) 12.5 MG tablet Take 12.5 mg by mouth 2 (two) times daily with meals.      DULoxetine (CYMBALTA) 60 MG capsule Take 1 capsule (60 mg total) by mouth once daily. 90 capsule 1    ibuprofen (ADVIL,MOTRIN) 800 MG tablet TAKE 1 TABLET BY MOUTH THREE TIMES DAILY AS NEEDED WITH FOOD 90 tablet 2    levothyroxine (SYNTHROID) 150 MCG tablet Take 150 mcg by mouth once daily.      LORazepam (ATIVAN) 1 MG tablet Take 1 tablet (1 mg total) by mouth 2 (two) times daily as needed for Anxiety. 60 tablet 5     losartan (COZAAR) 100 MG tablet Take 100 mg by mouth once daily.      ondansetron (ZOFRAN-ODT) 4 MG TbDL Take 1 tablet (4 mg total) by mouth every 12 (twelve) hours as needed (nausea). 30 tablet 1    pravastatin (PRAVACHOL) 20 MG tablet Take 20 mg by mouth once daily.      testosterone cypionate (DEPOTESTOTERONE CYPIONATE) 200 mg/mL injection INJECT 0.5 ML (CC) INTRAMUSCULARLY EVERY TWO WEEKS  5    valproic acid (DEPAKENE) 250 mg capsule Take 1 capsule by mouth 4 times daily 120 capsule 11     No current facility-administered medications for this visit.       Review of Systems   Constitutional: Positive for unexpected weight change. Negative for activity change and fatigue.   HENT: Negative for trouble swallowing and voice change.    Eyes: Negative for photophobia, pain and visual disturbance.   Respiratory: Negative for apnea and shortness of breath.    Cardiovascular: Negative for chest pain and palpitations.   Gastrointestinal: Negative for constipation, nausea and vomiting.   Genitourinary: Negative for difficulty urinating.   Musculoskeletal: Negative for arthralgias, back pain, gait problem, myalgias and neck pain.   Skin: Negative for color change and rash.   Neurological: Positive for headaches. Negative for dizziness, seizures, syncope, speech difficulty, weakness, light-headedness and numbness.   Psychiatric/Behavioral: Negative for agitation, behavioral problems and confusion.       Neurologic Exam     Mental Status   Oriented to person, place, and time.   Registration: recalls 3 of 3 objects.   Attention: normal. Concentration: normal.   Speech: speech is normal   Level of consciousness: alert  Knowledge: good.     Cranial Nerves     CN II   Right visual field deficit: none  Left visual field deficit: none     CN III, IV, VI   Pupils are equal, round, and reactive to light.  Extraocular motions are normal.   Right pupil: Size: 3 mm. Shape: regular.   Left pupil: Size: 3 mm. Shape: regular.   CN III:  "no CN III palsy  CN VI: no CN VI palsy  Nystagmus: none   Diplopia: none  Ophthalmoparesis: none  Upgaze: normal  Downgaze: normal  Conjugate gaze: present    CN VII   Facial expression full, symmetric.   Right facial weakness: none  Left facial weakness: none    CN VIII   CN VIII normal.     CN XI   CN XI normal.     CN XII   CN XII normal.   Tongue deviation: none    Motor Exam   Muscle bulk: normal  Overall muscle tone: normal  Right arm tone: normal  Left arm tone: normal  Right leg tone: normal  Left leg tone: normal    Gait, Coordination, and Reflexes     Gait  Gait: normal    Coordination   Finger to nose coordination: normal    Tremor   Resting tremor: absent      Physical Exam  Constitutional:       Appearance: He is well-developed.   HENT:      Head: Normocephalic and atraumatic.   Eyes:      Extraocular Movements: EOM normal.      Pupils: Pupils are equal, round, and reactive to light.   Pulmonary:      Effort: Pulmonary effort is normal. No respiratory distress.   Musculoskeletal:         General: Normal range of motion.   Neurological:      Mental Status: He is alert and oriented to person, place, and time.      Coordination: Finger-Nose-Finger Test normal.      Gait: Gait is intact.   Psychiatric:         Speech: Speech normal.         Behavior: Behavior normal.         Vitals:    08/09/22 0927   BP: 127/81   Pulse: 69   Weight: 81.9 kg (180 lb 8.9 oz)   Height: 5' 8" (1.727 m)       Assessment & Plan:    Problem List Items Addressed This Visit     History of seizures    Overview     Likely provoked seizures from his history of traumatic head injury.  Patient was likely always in the antiepileptic medications to reduce the risk of recurrent seizures.  Discuss transitioning from valproic acid to topiramate to reduce the potential for drug drug interactions and undesired side effects including but not limited to weight gain.           Headache    Overview     Posttraumatic headaches.  Currently helped " with his current dose of valproic acid.  Discussed transitioning to topiramate             Other Visit Diagnoses     Fatigue, unspecified type    -  Primary    Relevant Orders    Ambulatory referral/consult to Sleep Disorders          Follow-up: No follow-ups on file.    This note was done with the assistance of voice recognition software. Some errors may be present after proofreading.

## 2022-10-20 ENCOUNTER — OFFICE VISIT (OUTPATIENT)
Dept: PULMONOLOGY | Facility: CLINIC | Age: 67
End: 2022-10-20
Payer: MEDICARE

## 2022-10-20 ENCOUNTER — TELEPHONE (OUTPATIENT)
Dept: SLEEP MEDICINE | Facility: HOSPITAL | Age: 67
End: 2022-10-20
Payer: MEDICARE

## 2022-10-20 VITALS
OXYGEN SATURATION: 96 % | HEIGHT: 68 IN | HEART RATE: 73 BPM | SYSTOLIC BLOOD PRESSURE: 153 MMHG | WEIGHT: 183.63 LBS | DIASTOLIC BLOOD PRESSURE: 92 MMHG | BODY MASS INDEX: 27.83 KG/M2

## 2022-10-20 DIAGNOSIS — R53.83 FATIGUE, UNSPECIFIED TYPE: ICD-10-CM

## 2022-10-20 DIAGNOSIS — G47.19 EXCESSIVE DAYTIME SLEEPINESS: ICD-10-CM

## 2022-10-20 DIAGNOSIS — Z87.898 HISTORY OF SEIZURES: ICD-10-CM

## 2022-10-20 DIAGNOSIS — R06.83 SNORING: Primary | ICD-10-CM

## 2022-10-20 PROCEDURE — 99204 OFFICE O/P NEW MOD 45 MIN: CPT | Mod: S$PBB,,, | Performed by: INTERNAL MEDICINE

## 2022-10-20 PROCEDURE — 99214 OFFICE O/P EST MOD 30 MIN: CPT | Mod: PBBFAC | Performed by: INTERNAL MEDICINE

## 2022-10-20 PROCEDURE — 99999 PR PBB SHADOW E&M-EST. PATIENT-LVL IV: CPT | Mod: PBBFAC,,, | Performed by: INTERNAL MEDICINE

## 2022-10-20 PROCEDURE — 99999 PR PBB SHADOW E&M-EST. PATIENT-LVL IV: ICD-10-PCS | Mod: PBBFAC,,, | Performed by: INTERNAL MEDICINE

## 2022-10-20 PROCEDURE — 99204 PR OFFICE/OUTPT VISIT, NEW, LEVL IV, 45-59 MIN: ICD-10-PCS | Mod: S$PBB,,, | Performed by: INTERNAL MEDICINE

## 2022-10-20 NOTE — PROGRESS NOTES
Subjective:       Patient ID: Anthony Joseph Rockweiler is a 67 y.o. male.    Chief Complaint: Fatigue    HPI  Anthony Joseph Rockweiler is a 67 y.o. male who was referred by Dr. Mert iGbbons for a sleep evaluation for possible ZOFIA. Relevant medical history includes head trauma, seizure, hypertension, headache and CAD.    Reports poor sleep maintenance. No difficulty with initiation of sleep. Daytime fatigue is present, with tendency to doze off regularly. Tosses and turns frequently.    The patient reports witnessed snoring noticed by others. There is witnessed apneas. He has awoken from a snore and has had gasping for air. When he wakes up from sleep, he does  feel un refreshed and has headaches.    Symptoms of restless legs syndrome are not reported.    Symptoms began several years ago.    Prior sleep evaluation: none    Sleep medication taken: none.    Other sleep remedies: none    Sleep summary :  Bedtime: 10 PM  Sleep Latency: 5 min  # Awakenings: 5  WASO (wakefulness after sleep onset) a few min  Risetime: 6 AM    Bed partner is not present. Wife sleeps in another bedroom.    Snoring is usually of moderate intensity.    Naps are not taken frequently.    There is no evidence of choking awakening.   Apneas while asleep have not been reported by others.    Headaches are  frequent.    Bruxism is not reported at this time.    Currently, vivid dreams are not reported, nightmares are not reported, sleep paralysis is not reported, cataplexy is not reported, hypnagogic hallucinations are not reported and hypnopompic hallucinations are not reported.    There are no reports of sleep talking no sleep walking.    There is no evidence of violent movements while asleep that injury bed partners.    Patient does  drink  caffeinated beverages daily.    Alcoholic beverages are not ingested on a regular basis.    The bedroom environment is  adequate and  comfortable.            Patient provided ESS:    [place] Sleepiness  Scale TOTAL   (validated sleepiness questionnaire with a higher score indicating greater sleepiness; range 0-24)  [place] SLEEPINESS SCALE 10/20/2022   Sitting and reading 1   Watching TV 1   Sitting, inactive in a public place (e.g. a theatre or a meeting) 0   As a passenger in a car for an hour without a break 0   Lying down to rest in the afternoon when circumstances permit 2   Sitting and talking to someone 1   Sitting quietly after a lunch without alcohol 1   In a car, while stopped for a few minutes in traffic 0   Total score 6         STOP BANG questionnaire:    Snoring present: y  Tiredness present:y  Obstruction (apneas/choking episodes): y  Pressure (HTN): y    BMI greater than 35 kg/m2: n  Age greater than 50 years old: y  Neck circumference > than 17 inches if male or > than 16 inches if female : y  Gender being male: y    Total STOP BANG score = 7/8  Low risk ZOFIA: 0-2, Intermediate risk ZOFIA: 3-4, High risk ZOFIA: 5+      Most Recent Vital Signs:    The patient's body mass index is 27.92 kg/m².    Wt Readings from Last 5 Encounters:   10/20/22 83.3 kg (183 lb 10.3 oz)   08/09/22 81.9 kg (180 lb 8.9 oz)   04/29/22 83.1 kg (183 lb 3.2 oz)   01/28/22 83.2 kg (183 lb 6.8 oz)   12/15/21 85.8 kg (189 lb 2.5 oz)     BMI Readings from Last 5 Encounters:   10/20/22 27.92 kg/m²   08/09/22 27.45 kg/m²   04/29/22 27.86 kg/m²   01/28/22 27.89 kg/m²   12/15/21 28.76 kg/m²     Pulse Readings from Last 3 Encounters:   10/20/22 73   08/09/22 69   04/29/22 77         Current Outpatient Medications:     amLODIPine (NORVASC) 5 MG tablet, Take 5 mg by mouth 2 (two) times daily., Disp: , Rfl: 3    carvedilol (COREG) 12.5 MG tablet, Take 12.5 mg by mouth 2 (two) times daily with meals., Disp: , Rfl:     DULoxetine (CYMBALTA) 60 MG capsule, Take 1 capsule (60 mg total) by mouth once daily., Disp: 90 capsule, Rfl: 1    ibuprofen (ADVIL,MOTRIN) 800 MG tablet, TAKE 1 TABLET BY MOUTH THREE TIMES DAILY AS NEEDED WITH FOOD, Disp: 90  tablet, Rfl: 0    levothyroxine (SYNTHROID) 150 MCG tablet, Take 150 mcg by mouth once daily., Disp: , Rfl:     LORazepam (ATIVAN) 1 MG tablet, Take 1 tablet (1 mg total) by mouth 2 (two) times daily as needed for Anxiety., Disp: 60 tablet, Rfl: 5    losartan (COZAAR) 100 MG tablet, Take 100 mg by mouth once daily., Disp: , Rfl:     ondansetron (ZOFRAN-ODT) 4 MG TbDL, Take 1 tablet (4 mg total) by mouth every 12 (twelve) hours as needed (nausea)., Disp: 30 tablet, Rfl: 1    pravastatin (PRAVACHOL) 20 MG tablet, Take 20 mg by mouth once daily., Disp: , Rfl:     testosterone cypionate (DEPOTESTOTERONE CYPIONATE) 200 mg/mL injection, INJECT 0.5 ML (CC) INTRAMUSCULARLY EVERY TWO WEEKS, Disp: , Rfl: 5    valproic acid (DEPAKENE) 250 mg capsule, Take 1 capsule by mouth 4 times daily, Disp: 120 capsule, Rfl: 11      Objective:      Physical Exam   Constitutional: He is oriented to person, place, and time. He appears well-developed. He is not obese.   HENT:   Head: Normocephalic.   Nose: No mucosal edema.   Mouth/Throat: Oropharynx is clear and moist.   Additional erickson-pharyngeal and neck physical exam:    Uvula is visualized  Paez scale: II/IV  Tongue is  enlarged and without scalloped ridges  Nasal opening is normal and appears symmetric  Nasal allae is stable  Nasal septum is not deviated.  Nasal airway mucosa is normal  Soft pallate is enlarged and with medial crowding  Neck circumference of 17 inches.  Retrognathia is  present   Cardiovascular: Normal rate, regular rhythm and normal heart sounds.   No murmur heard.  Pulmonary/Chest: Normal expansion, symmetric chest wall expansion, effort normal and breath sounds normal. No stridor. No respiratory distress. He has no wheezes. He has no rales.   Abdominal: He exhibits no distension.   Musculoskeletal:         General: No edema.      Cervical back: Normal range of motion and neck supple.   Neurological: He is alert and oriented to person, place, and time.   Skin: Skin  is warm and dry.   Psychiatric: He has a normal mood and affect. His behavior is normal. Thought content normal.         Assessment:       1. Snoring    2. Fatigue, unspecified type    3. Excessive daytime sleepiness    4. History of seizures        Outpatient Encounter Medications as of 10/20/2022   Medication Sig Dispense Refill    amLODIPine (NORVASC) 5 MG tablet Take 5 mg by mouth 2 (two) times daily.  3    carvedilol (COREG) 12.5 MG tablet Take 12.5 mg by mouth 2 (two) times daily with meals.      DULoxetine (CYMBALTA) 60 MG capsule Take 1 capsule (60 mg total) by mouth once daily. 90 capsule 1    ibuprofen (ADVIL,MOTRIN) 800 MG tablet TAKE 1 TABLET BY MOUTH THREE TIMES DAILY AS NEEDED WITH FOOD 90 tablet 0    levothyroxine (SYNTHROID) 150 MCG tablet Take 150 mcg by mouth once daily.      LORazepam (ATIVAN) 1 MG tablet Take 1 tablet (1 mg total) by mouth 2 (two) times daily as needed for Anxiety. 60 tablet 5    losartan (COZAAR) 100 MG tablet Take 100 mg by mouth once daily.      ondansetron (ZOFRAN-ODT) 4 MG TbDL Take 1 tablet (4 mg total) by mouth every 12 (twelve) hours as needed (nausea). 30 tablet 1    pravastatin (PRAVACHOL) 20 MG tablet Take 20 mg by mouth once daily.      testosterone cypionate (DEPOTESTOTERONE CYPIONATE) 200 mg/mL injection INJECT 0.5 ML (CC) INTRAMUSCULARLY EVERY TWO WEEKS  5    valproic acid (DEPAKENE) 250 mg capsule Take 1 capsule by mouth 4 times daily 120 capsule 11    [DISCONTINUED] ibuprofen (ADVIL,MOTRIN) 800 MG tablet TAKE 1 TABLET BY MOUTH THREE TIMES DAILY AS NEEDED WITH FOOD 90 tablet 2     No facility-administered encounter medications on file as of 10/20/2022.     No orders of the defined types were placed in this encounter.      Plan:       # Snoring  # Daytime hypersomnia and fatigue  # Comorbid hypertension and hx of traumatic brain injury    Most likely secondary to sleep disordered breathing of obstructive or central origin.     Patient has been informed about the  pathophysiology and prognosis associated with ZOFIA and CSA and has been advised to undergo a baseline Polysomnography (PSG) study for further evaluation of his sleep disorder breathing.    Patient understood and agreed to undergo for a PSG study. In case that severe respiratory events are noted during the study, will proceed to convert to a split study for further Positive Airway Pressure titration. If persistent desaturations are seen during the study, will consider a trial of bi-level PAP therapy. If persistent desaturations are seen despite therapeutic pressures, patient would be a candidate for oxygen supplementation during the PAP titration.     Today's office encounter included review of salient clinical notes from others involved in the care of the patient, discrete laboratory elements and, as available, prior and present intervention for the disturbance of sleep.  The information gleaned was used in establishing the diagnosis and in stratification of disease risk.    The patient has a disturbance of sleep with clinically relevant potential for adverse behavioral, cardiovascular, and metabolic outcomes.  Untreated, the disturbance of sleep can have significant effect on mental health, clinical health and survival.     All patient's questions and concerns regarding Sleep Disorder Breathing were addressed during this visit.    I will follow up the patient closely. Will follow-up patient with results of PSG

## 2022-10-21 ENCOUNTER — TELEPHONE (OUTPATIENT)
Dept: SLEEP MEDICINE | Facility: HOSPITAL | Age: 67
End: 2022-10-21
Payer: MEDICARE

## 2022-11-23 ENCOUNTER — OFFICE VISIT (OUTPATIENT)
Dept: PSYCHIATRY | Facility: CLINIC | Age: 67
End: 2022-11-23
Payer: MEDICARE

## 2022-11-23 VITALS
BODY MASS INDEX: 27.83 KG/M2 | HEIGHT: 68 IN | WEIGHT: 183.63 LBS | SYSTOLIC BLOOD PRESSURE: 146 MMHG | DIASTOLIC BLOOD PRESSURE: 97 MMHG | HEART RATE: 76 BPM

## 2022-11-23 DIAGNOSIS — S06.9X9S TRAUMATIC BRAIN INJURY WITH LOSS OF CONSCIOUSNESS, SEQUELA: ICD-10-CM

## 2022-11-23 DIAGNOSIS — F07.9 PERSONALITY AND BEHAVIORAL DISORDERS DUE TO BRAIN DISEASE, DAMAGE, AND DYSFUNCTION: ICD-10-CM

## 2022-11-23 DIAGNOSIS — G93.9 PERSONALITY AND BEHAVIORAL DISORDERS DUE TO BRAIN DISEASE, DAMAGE, AND DYSFUNCTION: ICD-10-CM

## 2022-11-23 DIAGNOSIS — F06.30 MOOD DISORDER DUE TO A GENERAL MEDICAL CONDITION: Primary | ICD-10-CM

## 2022-11-23 DIAGNOSIS — F41.1 GENERALIZED ANXIETY DISORDER: ICD-10-CM

## 2022-11-23 PROCEDURE — 99213 OFFICE O/P EST LOW 20 MIN: CPT | Mod: S$PBB,,, | Performed by: PSYCHIATRY & NEUROLOGY

## 2022-11-23 PROCEDURE — 90833 PR PSYCHOTHERAPY W/PATIENT W/E&M, 30 MIN (ADD ON): ICD-10-PCS | Mod: ,,, | Performed by: PSYCHIATRY & NEUROLOGY

## 2022-11-23 PROCEDURE — 99213 PR OFFICE/OUTPT VISIT, EST, LEVL III, 20-29 MIN: ICD-10-PCS | Mod: S$PBB,,, | Performed by: PSYCHIATRY & NEUROLOGY

## 2022-11-23 PROCEDURE — 99213 OFFICE O/P EST LOW 20 MIN: CPT | Mod: PBBFAC | Performed by: PSYCHIATRY & NEUROLOGY

## 2022-11-23 PROCEDURE — 99999 PR PBB SHADOW E&M-EST. PATIENT-LVL III: CPT | Mod: PBBFAC,,, | Performed by: PSYCHIATRY & NEUROLOGY

## 2022-11-23 PROCEDURE — 90833 PSYTX W PT W E/M 30 MIN: CPT | Mod: ,,, | Performed by: PSYCHIATRY & NEUROLOGY

## 2022-11-23 PROCEDURE — 99499 UNLISTED E&M SERVICE: CPT | Mod: S$PBB,,, | Performed by: PSYCHIATRY & NEUROLOGY

## 2022-11-23 PROCEDURE — 99499 RISK ADDL DX/OHS AUDIT: ICD-10-PCS | Mod: S$PBB,,, | Performed by: PSYCHIATRY & NEUROLOGY

## 2022-11-23 PROCEDURE — 99999 PR PBB SHADOW E&M-EST. PATIENT-LVL III: ICD-10-PCS | Mod: PBBFAC,,, | Performed by: PSYCHIATRY & NEUROLOGY

## 2022-11-23 RX ORDER — DULOXETIN HYDROCHLORIDE 60 MG/1
60 CAPSULE, DELAYED RELEASE ORAL DAILY
Qty: 90 CAPSULE | Refills: 1 | Status: SHIPPED | OUTPATIENT
Start: 2022-11-23 | End: 2023-07-03

## 2022-11-23 RX ORDER — LORAZEPAM 1 MG/1
1 TABLET ORAL 2 TIMES DAILY PRN
Qty: 60 TABLET | Refills: 5 | Status: SHIPPED | OUTPATIENT
Start: 2022-11-23 | End: 2023-03-20 | Stop reason: SDUPTHER

## 2022-11-23 NOTE — PROGRESS NOTES
Outpatient Psychiatry Follow-Up Visit (MD/NP)    11/23/2022    Clinical Status of Patient:  Outpatient (Ambulatory)    Chief Complaint:  Anthony Joseph Rockweiler is a 67 y.o. male who presents today for follow-up of depression and anxiety.  Met with patient.      Interval History and Content of Current Session:  Interim Events/Subjective Report/Content of Current Session:  Patient Anthony Joseph Rockweiler presents for follow-up.   What is scheduled as a 30 min appointment actually takes 50 min of time due to counseling and education.  Again very long-winded and almost uninterruptible in conversation.  Still grandiose in some of his statements, about how women all want to look at him and ask about his clothes.  Says that strangers want to know why he looks so good at his age.  Didn't take lorazepam this morning and says that this is the reason that his blood pressure is elevated.  Says that his personal life is not good at home but does not elaborate.  Says that his son is very smart but doesn't treat him with respect that he deserves.  Now getting Depakote from neurology.  Not depressed or anxious.  Continues to work out.  Asking for refills of medications.  Trying to use less lorazepam but not successful.  Asking to continue his medications.    Psychotherapy:  Target symptoms: depression, anxiety   Why chosen therapy is appropriate versus another modality: evidence based practice  Outcome monitoring methods: self-report, observation  Therapeutic intervention type: supportive psychotherapy  Topics discussed/themes: relationships difficulties, stress related to medical comorbidities, building skills sets for symptom management, symptom recognition  The patient's response to the intervention is reluctant. The patient's progress toward treatment goals is poor.   Duration of intervention: 30 minutes.    Review of Systems   PSYCHIATRIC: Pertinant items are noted in the narrative.  CONSTITUTIONAL: No weight gain or  "loss.   MUSCULOSKELETAL: Positive for pain.  NEUROLOGIC: No weakness, sensory changes, seizures, confusion, memory loss, tremor or other abnormal movements.  RESPIRATORY: No shortness of breath.  CARDIOVASCULAR: No tachycardia or chest pain.  GASTROINTESTINAL: No nausea, vomiting, pain, constipation or diarrhea.   Tinnitus - chronic  Headache today    Past Medical, Family and Social History: The patient's past medical, family and social history have been reviewed and updated as appropriate within the electronic medical record - see encounter notes.    Compliance: yes    Side effects: None    Risk Parameters:  Patient reports no suicidal ideation  Patient reports no homicidal ideation  Patient reports no self-injurious behavior  Patient reports no violent behavior    Exam (detailed: at least 9 elements; comprehensive: all 15 elements)   Constitutional  Vitals:  Most recent vital signs, dated less than 90 days prior to this appointment, were reviewed.   Vitals:    11/23/22 0858   BP: (!) 146/97   Pulse: 76   Weight: 83.3 kg (183 lb 10.3 oz)   Height: 5' 8" (1.727 m)          General:  unremarkable, age appropriate     Musculoskeletal  Muscle Strength/Tone:  no tremor, no tic   Gait & Station:  non-ataxic     Psychiatric  Speech:  no latency; no press   Mood & Affect:  anxious, dysthymic  expansive   Thought Process:  perseverative   Associations:  tangential   Thought Content:  normal, no suicidality, no homicidality, delusions, or paranoia   Insight:  limited awareness of illness   Judgement: limited   Orientation:  person, place, situation, time/date   Memory: intact for content of interview   Language: able to name, able to repeat   Attention Span & Concentration:  distracted   Fund of Knowledge:  intact and appropriate to age and level of education     Assessment and Diagnosis   Status/Progress: Based on the examination today, the patient's problem(s) is/are adequately but not ideally controlled.  New problems " have been presented today.   Co-morbidities are complicating management of the primary condition.  There are no active rule-out diagnoses for this patient at this time.     General Impression: We will continue pharmacological intervention and adjunctive therapy.       ICD-10-CM ICD-9-CM   1. Mood disorder due to a general medical condition  F06.30 293.83   2. Traumatic brain injury with loss of consciousness, sequela  S06.9X9S 907.0   3. Generalized anxiety disorder  F41.1 300.02   4. 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 The risks and benefits of medication were discussed with the patient.  Counseling provided with patient as follows: importance of compliance with chosen treatment options was emphasized, risks and benefits of treatment options, including medications, were discussed with the patient, risk factor reduction, prognosis, patient education, instructions for  management, treatment and follow-up were reviewed  1.  Continue Depakote as per neurologist 250 mg in the morning and 500 mg nightly targeting seizures.  This is also an help with his mood stabilization.  2.  Educated patient about lorazepam taper.  Agree with temporary use of lorazepam 1 mg twice daily as needed for anxiety.  We will do this for the next couple of months then decrease again with a taper off of the medicine.  Warned of risk of oversedation, falls, and not to drink or drive until the effects of the medication are known.  Warned of risk of addictive and withdrawal potential.  Warned patient to keep medications in pill bottle and not to carry loosely.  3.  Educated patient that he needs intensive individual therapy as well as couples therapy but he is not interested in either at this time.  4.   Will need to watch patient because he has addictive and manipulative  behaviors.  He has potential for impulsive actions and prescription drug overuse because of the traumatic brain injury.  Maintain boundaries.  5.  May consider augmentation with lamotrigine or topiramate in the future to help as headache prevention medicine and mood stabilizer but he is hesitant to medication changes.  6.  Educated patient that his heavy use of Excedrin and ibuprofen is likely causing rebound headaches and tinnitus but he disagrees.    7.  This is a difficult case.    8.  Continue Cymbalta 60 mg daily targeting depression, anxiety, chronic pains.  Warned of risk of indio, suicidality, serotonin syndrome.  This may off label help with some weight loss.  9.  A lot of attention seeking in grandiose characteristics which is highly indicative of a personality disorder, likely due to the head injury.  He is very narcissistic.  This makes him a very difficult person to interact with on a personal level.  He needs intense therapy better understanding, insight, and guidance but is not open to going to an individual therapist.  Very little insight into how the head injury has likely changed his way of thinking.  I read his book which has a lot of narcissistic traits within.    Return to Clinic: 3 months, as needed

## 2022-11-23 NOTE — PATIENT INSTRUCTIONS

## 2022-11-30 ENCOUNTER — TELEPHONE (OUTPATIENT)
Dept: SLEEP MEDICINE | Facility: HOSPITAL | Age: 67
End: 2022-11-30
Payer: MEDICARE

## 2023-01-09 ENCOUNTER — TELEPHONE (OUTPATIENT)
Dept: OPHTHALMOLOGY | Facility: CLINIC | Age: 68
End: 2023-01-09

## 2023-01-09 ENCOUNTER — TELEPHONE (OUTPATIENT)
Dept: FAMILY MEDICINE | Facility: CLINIC | Age: 68
End: 2023-01-09

## 2023-01-09 ENCOUNTER — OFFICE VISIT (OUTPATIENT)
Dept: OPTOMETRY | Facility: CLINIC | Age: 68
End: 2023-01-09
Payer: MEDICARE

## 2023-01-09 DIAGNOSIS — H25.13 NUCLEAR SCLEROSIS OF BOTH EYES: ICD-10-CM

## 2023-01-09 DIAGNOSIS — H53.9 VISUAL DISTURBANCES: Primary | ICD-10-CM

## 2023-01-09 DIAGNOSIS — H04.123 DRY EYE SYNDROME, BILATERAL: ICD-10-CM

## 2023-01-09 PROCEDURE — 99212 OFFICE O/P EST SF 10 MIN: CPT | Mod: PBBFAC,PO | Performed by: OPTOMETRIST

## 2023-01-09 PROCEDURE — 92004 PR EYE EXAM, NEW PATIENT,COMPREHESV: ICD-10-PCS | Mod: HCWC,S$GLB,, | Performed by: OPTOMETRIST

## 2023-01-09 PROCEDURE — 92004 COMPRE OPH EXAM NEW PT 1/>: CPT | Mod: HCWC,S$GLB,, | Performed by: OPTOMETRIST

## 2023-01-09 PROCEDURE — 99999 PR PBB SHADOW E&M-EST. PATIENT-LVL II: ICD-10-PCS | Mod: PBBFAC,HCWC,, | Performed by: OPTOMETRIST

## 2023-01-09 PROCEDURE — 99999 PR PBB SHADOW E&M-EST. PATIENT-LVL II: CPT | Mod: PBBFAC,HCWC,, | Performed by: OPTOMETRIST

## 2023-01-09 NOTE — TELEPHONE ENCOUNTER
----- Message from Petey Hickman sent at 1/9/2023 10:35 AM CST -----  Contact: # [phone]  Pt is calling because he feels like he has something in his eye and he is getting flashes of color. Please call back to further assist.

## 2023-01-09 NOTE — PROGRESS NOTES
Subjective:       Patient ID: Anthony Joseph Rockweiler is a 67 y.o. male      Chief Complaint   Patient presents with    Eye Problem     History of Present Illness  Dls: 10 + yrs     66 y/o male presents today with c/o x 5 days ago fbs od feels very dry od x 3 days notice flashing colorful lights od off/on no changes in vision pt always has ha's no pain     No episodes of colors in vision today.   Pt states yrs ago h/o vision loss     Eye meds  Gentamicin OD Q 4 hrs x 3 days did not used any today       Assessment/Plan:     1. Visual disturbances  Discussed causes of flashes and floaters with the patient and described the warnings of a possible retinal detachment. Advised patient to call if there is an increase in flashes or floaters.    2. Dry eye syndrome, bilateral  AT BID - QID PRN.    3. Nuclear sclerosis of both eyes  NVS. Monitor.     Follow up if symptoms worsen or fail to improve.

## 2023-01-09 NOTE — TELEPHONE ENCOUNTER
----- Message from Yari Amaya sent at 1/9/2023 10:00 AM CST -----  Regarding: Requesting advice  .Type:  Needs Medical Advice/Symptom-based Call    Who Called: Self    Symptoms (please be specific): dry eyes, far right side of eyes colors appear, getting worse, no relief     How long has patient had these symptoms: 5 days    Would the patient rather a call back or a response via My [facility]?     Best Call Back Number: [phone]      Additional Information: is this PCP problem or if patient should see Opthalmology

## 2023-01-17 ENCOUNTER — TELEPHONE (OUTPATIENT)
Dept: PSYCHIATRY | Facility: CLINIC | Age: 68
End: 2023-01-17

## 2023-01-17 NOTE — TELEPHONE ENCOUNTER
Pt called, lvm requesting call back when we have received his [facility] paperwork. Returned call, notified we received paperwork. No concerns voiced.

## 2023-01-24 RX ORDER — MECLIZINE HYDROCHLORIDE 25 MG/1
TABLET ORAL
COMMUNITY
Start: 2023-01-11 | End: 2023-07-03

## 2023-01-24 RX ORDER — LEVOTHYROXINE SODIUM 150 UG/1
150 TABLET ORAL DAILY
OUTPATIENT
Start: 2023-01-24

## 2023-01-24 RX ORDER — CYCLOBENZAPRINE HCL 10 MG
10 TABLET ORAL NIGHTLY
COMMUNITY
Start: 2023-01-16 | End: 2023-07-03

## 2023-01-31 ENCOUNTER — OFFICE VISIT (OUTPATIENT)
Dept: FAMILY MEDICINE | Facility: CLINIC | Age: 68
End: 2023-01-31
Payer: MEDICARE

## 2023-01-31 VITALS
HEART RATE: 68 BPM | WEIGHT: 175.94 LBS | DIASTOLIC BLOOD PRESSURE: 88 MMHG | TEMPERATURE: 98 F | BODY MASS INDEX: 26.66 KG/M2 | HEIGHT: 68 IN | OXYGEN SATURATION: 97 % | SYSTOLIC BLOOD PRESSURE: 124 MMHG

## 2023-01-31 DIAGNOSIS — E29.1 HYPOGONADISM IN MALE: ICD-10-CM

## 2023-01-31 DIAGNOSIS — I10 ESSENTIAL HYPERTENSION: ICD-10-CM

## 2023-01-31 DIAGNOSIS — Z00.00 ENCOUNTER FOR MEDICAL EXAMINATION TO ESTABLISH CARE: Primary | ICD-10-CM

## 2023-01-31 DIAGNOSIS — F13.20 MODERATE BENZODIAZEPINE USE DISORDER: ICD-10-CM

## 2023-01-31 DIAGNOSIS — F41.1 GAD (GENERALIZED ANXIETY DISORDER): ICD-10-CM

## 2023-01-31 DIAGNOSIS — E03.9 HYPOTHYROIDISM, UNSPECIFIED TYPE: ICD-10-CM

## 2023-01-31 DIAGNOSIS — E78.5 HYPERLIPIDEMIA, UNSPECIFIED HYPERLIPIDEMIA TYPE: ICD-10-CM

## 2023-01-31 DIAGNOSIS — Z87.820 HISTORY OF TRAUMATIC BRAIN INJURY: ICD-10-CM

## 2023-01-31 DIAGNOSIS — Z00.00 ANNUAL PHYSICAL EXAM: ICD-10-CM

## 2023-01-31 DIAGNOSIS — Z12.5 PROSTATE CANCER SCREENING: ICD-10-CM

## 2023-01-31 PROCEDURE — 99214 PR OFFICE/OUTPT VISIT, EST, LEVL IV, 30-39 MIN: ICD-10-PCS | Mod: S$GLB,,, | Performed by: FAMILY MEDICINE

## 2023-01-31 PROCEDURE — 99999 PR PBB SHADOW E&M-EST. PATIENT-LVL IV: ICD-10-PCS | Mod: PBBFAC,,, | Performed by: FAMILY MEDICINE

## 2023-01-31 PROCEDURE — 99999 PR PBB SHADOW E&M-EST. PATIENT-LVL IV: CPT | Mod: PBBFAC,,, | Performed by: FAMILY MEDICINE

## 2023-01-31 PROCEDURE — 99214 OFFICE O/P EST MOD 30 MIN: CPT | Mod: PBBFAC,PO | Performed by: FAMILY MEDICINE

## 2023-01-31 PROCEDURE — 99214 OFFICE O/P EST MOD 30 MIN: CPT | Mod: S$GLB,,, | Performed by: FAMILY MEDICINE

## 2023-01-31 RX ORDER — LEVOTHYROXINE SODIUM 150 UG/1
150 TABLET ORAL DAILY
Qty: 30 TABLET | Refills: 11 | Status: SHIPPED | OUTPATIENT
Start: 2023-01-31 | End: 2023-08-07 | Stop reason: SDUPTHER

## 2023-01-31 NOTE — PROGRESS NOTES
Assessment & Plan  Encounter for medical examination to establish care    Annual physical exam  -     Comprehensive Metabolic Panel; Future; Expected date: 01/31/2023  -     CBC Auto Differential; Future; Expected date: 01/31/2023  -     Hemoglobin A1C; Future; Expected date: 01/31/2023  -     Lipid Panel; Future; Expected date: 01/31/2023  -     TSH; Future; Expected date: 01/31/2023  -     T4, Free; Future; Expected date: 01/31/2023  -     T3; Future; Expected date: 01/31/2023  -     PSA, Screening; Future; Expected date: 01/31/2023  -     HIV 1/2 Ag/Ab (4th Gen); Future; Expected date: 01/31/2023  -     Hepatitis C Antibody; Future; Expected date: 01/31/2023  -     TESTOSTERONE PANEL; Future; Expected date: 01/31/2023    Routine fasting labs to be scheduled.     Essential hypertension  -     Comprehensive Metabolic Panel; Future; Expected date: 01/31/2023  -     CBC Auto Differential; Future; Expected date: 01/31/2023  -     Hemoglobin A1C; Future; Expected date: 01/31/2023  -     Lipid Panel; Future; Expected date: 01/31/2023    Controlled. Continue current therapy.     Hyperlipidemia, unspecified hyperlipidemia type  -     Hemoglobin A1C; Future; Expected date: 01/31/2023  -     Lipid Panel; Future; Expected date: 01/31/2023    Hypothyroidism, unspecified type  -     levothyroxine (SYNTHROID) 150 MCG tablet; Take 1 tablet (150 mcg total) by mouth once daily.  Dispense: 30 tablet; Refill: 11  -     TSH; Future; Expected date: 01/31/2023  -     T4, Free; Future; Expected date: 01/31/2023  -     T3; Future; Expected date: 01/31/2023    Synthroid refilled.     Hypogonadism in male  -     TESTOSTERONE PANEL; Future; Expected date: 01/31/2023    RONDA (generalized anxiety disorder)  Moderate benzodiazepine use disorder  Controlled. Continue current therapy plan per Psychiatry.     History of traumatic brain injury  Controlled. Continue current therapy plan her Neurology.     Prostate cancer screening  -     PSA,  Screening; Future; Expected date: 01/31/2023    Declines all vaccines today.    Follow-up: Follow up in about 6 months (around 7/31/2023).  ______________________________________________________________________    Chief Complaint  Chief Complaint   Patient presents with    Annual Exam    Establish Care       HPI  Anthony Joseph Rockweiler is a 67 y.o. male with medical diagnoses as listed in the medical history and problem list that presents to the office to establish care and for his annual exam. He is in his usual state of health today.     Health Maintenance         Date Due Completion Date    Hepatitis C Screening Never done ---    HIV Screening Never done ---    Shingles Vaccine (1 of 2) Never done ---    TETANUS VACCINE 09/09/2015 9/9/2005    COVID-19 Vaccine (4 - Booster for Pfizer series) 03/25/2022 1/28/2022    Pneumococcal Vaccines (Age 65+) (1 - PCV) Never done ---    Influenza Vaccine (1) Never done ---    Lipid Panel 06/28/2027 6/28/2022    Colorectal Cancer Screening 10/02/2027 10/2/2017              PAST MEDICAL HISTORY:  Past Medical History:   Diagnosis Date    Headache     migraines    Hx of psychiatric care     Hypertension     Psychiatric problem     Seizures     Therapy        PAST SURGICAL HISTORY:  History reviewed. No pertinent surgical history.    SOCIAL HISTORY:  Social History     Socioeconomic History    Marital status:     Number of children: 2   Tobacco Use    Smoking status: Never    Smokeless tobacco: Never   Substance and Sexual Activity    Alcohol use: No    Drug use: No    Sexual activity: Yes     Partners: Female     Birth control/protection: Condom       FAMILY HISTORY:  Family History   Problem Relation Age of Onset    No Known Problems Mother     No Known Problems Father     No Known Problems Sister     No Known Problems Brother     No Known Problems Maternal Aunt     No Known Problems Maternal Uncle     No Known Problems Paternal Aunt     No Known Problems Paternal  Uncle     No Known Problems Maternal Grandmother     No Known Problems Maternal Grandfather     No Known Problems Paternal Grandmother     No Known Problems Paternal Grandfather     Suicide Other     Dementia Neg Hx        ALLERGIES AND MEDICATIONS: updated and reviewed.  Review of patient's allergies indicates:   Allergen Reactions    Paroxetine      Urinary problems     Current Outpatient Medications   Medication Sig Dispense Refill    amLODIPine (NORVASC) 5 MG tablet Take 5 mg by mouth 2 (two) times daily.  3    carvedilol (COREG) 12.5 MG tablet Take 12.5 mg by mouth 2 (two) times daily with meals.      cyclobenzaprine (FLEXERIL) 10 MG tablet Take 10 mg by mouth every evening.      DULoxetine (CYMBALTA) 60 MG capsule Take 1 capsule (60 mg total) by mouth once daily. 90 capsule 1    ibuprofen (ADVIL,MOTRIN) 800 MG tablet TAKE 1 TABLET BY MOUTH THREE TIMES DAILY AS NEEDED WITH FOOD 90 tablet 0    LORazepam (ATIVAN) 1 MG tablet Take 1 tablet (1 mg total) by mouth 2 (two) times daily as needed for Anxiety. 60 tablet 5    losartan (COZAAR) 100 MG tablet Take 100 mg by mouth once daily.      meclizine (ANTIVERT) 25 mg tablet SMARTSI Tablet(s) By Mouth Every 12 Hours PRN      ondansetron (ZOFRAN-ODT) 4 MG TbDL Take 1 tablet (4 mg total) by mouth every 12 (twelve) hours as needed (nausea). 30 tablet 1    pravastatin (PRAVACHOL) 20 MG tablet Take 20 mg by mouth once daily.      testosterone cypionate (DEPOTESTOTERONE CYPIONATE) 200 mg/mL injection INJECT 0.5 ML (CC) INTRAMUSCULARLY EVERY TWO WEEKS  5    valproic acid (DEPAKENE) 250 mg capsule Take 1 capsule by mouth 4 times daily 120 capsule 11    levothyroxine (SYNTHROID) 150 MCG tablet Take 1 tablet (150 mcg total) by mouth once daily. 30 tablet 11     No current facility-administered medications for this visit.         ROS  Review of Systems   Constitutional:  Negative for activity change, fever and unexpected weight change.   HENT:  Negative for congestion and sore  "throat.    Eyes:  Negative for photophobia and visual disturbance.   Respiratory:  Negative for cough and shortness of breath.    Cardiovascular:  Negative for chest pain and leg swelling.   Gastrointestinal:  Negative for abdominal pain, constipation, diarrhea, nausea and vomiting.   Endocrine: Negative for polydipsia, polyphagia and polyuria.   Genitourinary:  Negative for dysuria and urgency.   Musculoskeletal:  Negative for arthralgias and gait problem.   Skin:  Negative for color change.   Neurological:  Negative for dizziness, weakness and headaches.   Psychiatric/Behavioral:  Negative for dysphoric mood and sleep disturbance. The patient is not nervous/anxious.          Physical Exam  Vitals:    01/31/23 0923 01/31/23 0959   BP: (!) 126/108 124/88   BP Location: Right arm    Patient Position: Sitting    BP Method: Medium (Manual)    Pulse: 68    Temp: 98 °F (36.7 °C)    TempSrc: Oral    SpO2: 97%    Weight: 79.8 kg (175 lb 14.8 oz)    Height: 5' 8" (1.727 m)     Body mass index is 26.75 kg/m².  Weight: 79.8 kg (175 lb 14.8 oz)   Height: 5' 8" (172.7 cm)   Physical Exam  Constitutional:       General: He is not in acute distress.     Appearance: Normal appearance.   HENT:      Head: Normocephalic and atraumatic.   Neck:      Thyroid: No thyromegaly.      Vascular: No carotid bruit.   Cardiovascular:      Rate and Rhythm: Normal rate and regular rhythm.      Pulses: Normal pulses.      Heart sounds: Normal heart sounds.   Pulmonary:      Effort: Pulmonary effort is normal. No respiratory distress.      Breath sounds: Normal breath sounds.   Musculoskeletal:      Cervical back: Neck supple.      Right lower leg: No edema.      Left lower leg: No edema.   Lymphadenopathy:      Cervical: No cervical adenopathy.   Skin:     General: Skin is warm and dry.      Findings: No rash.   Neurological:      General: No focal deficit present.      Mental Status: He is alert and oriented to person, place, and time. "   Psychiatric:         Mood and Affect: Mood normal.         Behavior: Behavior normal.         Thought Content: Thought content normal.

## 2023-02-01 ENCOUNTER — LAB VISIT (OUTPATIENT)
Dept: LAB | Facility: HOSPITAL | Age: 68
End: 2023-02-01
Attending: FAMILY MEDICINE
Payer: MEDICARE

## 2023-02-01 DIAGNOSIS — I10 ESSENTIAL HYPERTENSION: ICD-10-CM

## 2023-02-01 DIAGNOSIS — Z00.00 ANNUAL PHYSICAL EXAM: ICD-10-CM

## 2023-02-01 DIAGNOSIS — Z12.5 PROSTATE CANCER SCREENING: ICD-10-CM

## 2023-02-01 DIAGNOSIS — E78.5 HYPERLIPIDEMIA, UNSPECIFIED HYPERLIPIDEMIA TYPE: ICD-10-CM

## 2023-02-01 DIAGNOSIS — E03.9 HYPOTHYROIDISM, UNSPECIFIED TYPE: ICD-10-CM

## 2023-02-01 DIAGNOSIS — E29.1 HYPOGONADISM IN MALE: ICD-10-CM

## 2023-02-01 LAB
ALBUMIN SERPL BCP-MCNC: 4.1 G/DL (ref 3.5–5.2)
ALP SERPL-CCNC: 72 U/L (ref 55–135)
ALT SERPL W/O P-5'-P-CCNC: 47 U/L (ref 10–44)
ANION GAP SERPL CALC-SCNC: 8 MMOL/L (ref 8–16)
AST SERPL-CCNC: 30 U/L (ref 10–40)
BASOPHILS # BLD AUTO: 0.09 K/UL (ref 0–0.2)
BASOPHILS NFR BLD: 1.2 % (ref 0–1.9)
BILIRUB SERPL-MCNC: 0.8 MG/DL (ref 0.1–1)
BUN SERPL-MCNC: 16 MG/DL (ref 8–23)
CALCIUM SERPL-MCNC: 10.2 MG/DL (ref 8.7–10.5)
CHLORIDE SERPL-SCNC: 106 MMOL/L (ref 95–110)
CHOLEST SERPL-MCNC: 175 MG/DL (ref 120–199)
CHOLEST/HDLC SERPL: 4.5 {RATIO} (ref 2–5)
CO2 SERPL-SCNC: 24 MMOL/L (ref 23–29)
COMPLEXED PSA SERPL-MCNC: 29.5 NG/ML (ref 0–4)
CREAT SERPL-MCNC: 1 MG/DL (ref 0.5–1.4)
DIFFERENTIAL METHOD: ABNORMAL
EOSINOPHIL # BLD AUTO: 0.1 K/UL (ref 0–0.5)
EOSINOPHIL NFR BLD: 1.7 % (ref 0–8)
ERYTHROCYTE [DISTWIDTH] IN BLOOD BY AUTOMATED COUNT: 17.9 % (ref 11.5–14.5)
EST. GFR  (NO RACE VARIABLE): >60 ML/MIN/1.73 M^2
ESTIMATED AVG GLUCOSE: 120 MG/DL (ref 68–131)
GLUCOSE SERPL-MCNC: 107 MG/DL (ref 70–110)
HBA1C MFR BLD: 5.8 % (ref 4–5.6)
HCT VFR BLD AUTO: 55.3 % (ref 40–54)
HDLC SERPL-MCNC: 39 MG/DL (ref 40–75)
HDLC SERPL: 22.3 % (ref 20–50)
HGB BLD-MCNC: 18.2 G/DL (ref 14–18)
IMM GRANULOCYTES # BLD AUTO: 0.04 K/UL (ref 0–0.04)
IMM GRANULOCYTES NFR BLD AUTO: 0.6 % (ref 0–0.5)
LDLC SERPL CALC-MCNC: 95.8 MG/DL (ref 63–159)
LYMPHOCYTES # BLD AUTO: 2.1 K/UL (ref 1–4.8)
LYMPHOCYTES NFR BLD: 29.3 % (ref 18–48)
MCH RBC QN AUTO: 27.5 PG (ref 27–31)
MCHC RBC AUTO-ENTMCNC: 32.9 G/DL (ref 32–36)
MCV RBC AUTO: 83 FL (ref 82–98)
MONOCYTES # BLD AUTO: 0.9 K/UL (ref 0.3–1)
MONOCYTES NFR BLD: 12.8 % (ref 4–15)
NEUTROPHILS # BLD AUTO: 3.9 K/UL (ref 1.8–7.7)
NEUTROPHILS NFR BLD: 54.4 % (ref 38–73)
NONHDLC SERPL-MCNC: 136 MG/DL
NRBC BLD-RTO: 0 /100 WBC
PLATELET # BLD AUTO: 275 K/UL (ref 150–450)
PMV BLD AUTO: 10.3 FL (ref 9.2–12.9)
POTASSIUM SERPL-SCNC: 4.4 MMOL/L (ref 3.5–5.1)
PROT SERPL-MCNC: 7.9 G/DL (ref 6–8.4)
RBC # BLD AUTO: 6.63 M/UL (ref 4.6–6.2)
SODIUM SERPL-SCNC: 138 MMOL/L (ref 136–145)
T4 FREE SERPL-MCNC: 1.21 NG/DL (ref 0.71–1.51)
TRIGL SERPL-MCNC: 201 MG/DL (ref 30–150)
TSH SERPL DL<=0.005 MIU/L-ACNC: 0.12 UIU/ML (ref 0.4–4)
WBC # BLD AUTO: 7.21 K/UL (ref 3.9–12.7)

## 2023-02-01 PROCEDURE — 85025 COMPLETE CBC W/AUTO DIFF WBC: CPT | Performed by: FAMILY MEDICINE

## 2023-02-01 PROCEDURE — 84403 ASSAY OF TOTAL TESTOSTERONE: CPT | Performed by: FAMILY MEDICINE

## 2023-02-01 PROCEDURE — 83036 HEMOGLOBIN GLYCOSYLATED A1C: CPT | Performed by: FAMILY MEDICINE

## 2023-02-01 PROCEDURE — 80053 COMPREHEN METABOLIC PANEL: CPT | Performed by: FAMILY MEDICINE

## 2023-02-01 PROCEDURE — 36415 COLL VENOUS BLD VENIPUNCTURE: CPT | Mod: PO | Performed by: FAMILY MEDICINE

## 2023-02-01 PROCEDURE — 80061 LIPID PANEL: CPT | Performed by: FAMILY MEDICINE

## 2023-02-01 PROCEDURE — 84443 ASSAY THYROID STIM HORMONE: CPT | Performed by: FAMILY MEDICINE

## 2023-02-01 PROCEDURE — 84153 ASSAY OF PSA TOTAL: CPT | Performed by: FAMILY MEDICINE

## 2023-02-01 PROCEDURE — 86803 HEPATITIS C AB TEST: CPT | Performed by: FAMILY MEDICINE

## 2023-02-01 PROCEDURE — 84270 ASSAY OF SEX HORMONE GLOBUL: CPT | Performed by: FAMILY MEDICINE

## 2023-02-01 PROCEDURE — 84439 ASSAY OF FREE THYROXINE: CPT | Performed by: FAMILY MEDICINE

## 2023-02-01 PROCEDURE — 87389 HIV-1 AG W/HIV-1&-2 AB AG IA: CPT | Performed by: FAMILY MEDICINE

## 2023-02-01 PROCEDURE — 84480 ASSAY TRIIODOTHYRONINE (T3): CPT | Performed by: FAMILY MEDICINE

## 2023-02-02 LAB
HCV AB SERPL QL IA: NORMAL
HIV 1+2 AB+HIV1 P24 AG SERPL QL IA: NORMAL
T3 SERPL-MCNC: 91 NG/DL (ref 60–180)

## 2023-02-07 DIAGNOSIS — Z00.00 ENCOUNTER FOR MEDICARE ANNUAL WELLNESS EXAM: ICD-10-CM

## 2023-02-08 LAB
ALBUMIN SERPL-MCNC: 4.5 G/DL (ref 3.6–5.1)
SHBG SERPL-SCNC: 13 NMOL/L (ref 22–77)
TESTOST FREE SERPL-MCNC: 20.5 PG/ML (ref 46–224)
TESTOST SERPL-MCNC: 92 NG/DL (ref 250–1100)
TESTOSTERONE.FREE+WB SERPL-MCNC: 42.2 NG/DL (ref 110–575)

## 2023-02-09 ENCOUNTER — PATIENT MESSAGE (OUTPATIENT)
Dept: FAMILY MEDICINE | Facility: CLINIC | Age: 68
End: 2023-02-09

## 2023-02-09 DIAGNOSIS — Z00.00 ENCOUNTER FOR MEDICARE ANNUAL WELLNESS EXAM: ICD-10-CM

## 2023-02-09 DIAGNOSIS — E29.1 HYPOGONADISM IN MALE: ICD-10-CM

## 2023-02-09 DIAGNOSIS — R97.20 ELEVATED PSA: Primary | ICD-10-CM

## 2023-02-09 NOTE — TELEPHONE ENCOUNTER
Please contact patient and let him know that his PSA (prostate test) is severely elevated and A1c is showing pre-diabetes. His thyroid studies are borderline. I have put in a referral to Urology for further evaluation of his prostate, but in the meantime I would like to schedule a virtual visit to discuss his result in further detail. Please OR virtual appointment for Monday or Tuesday next week.

## 2023-02-09 NOTE — TELEPHONE ENCOUNTER
Pt notified of doctor's instructions below. Pt verbalized understanding and confirmed VV for Monday.

## 2023-02-09 NOTE — TELEPHONE ENCOUNTER
Attempted to contact pt to give results in below message. No answer, left VM instructing pt to return call to clinic. VV scheduled for Monday to hold appointment for pt. If pt returns call appointment will be verified with pt.

## 2023-02-23 ENCOUNTER — TELEPHONE (OUTPATIENT)
Dept: FAMILY MEDICINE | Facility: CLINIC | Age: 68
End: 2023-02-23

## 2023-02-23 ENCOUNTER — PATIENT MESSAGE (OUTPATIENT)
Dept: PSYCHIATRY | Facility: CLINIC | Age: 68
End: 2023-02-23

## 2023-02-23 NOTE — TELEPHONE ENCOUNTER
Tried calling patient to let him know he has refills at his pharmacy. Patient didn't answer the phone but [email or web address].

## 2023-02-23 NOTE — TELEPHONE ENCOUNTER
----- Message from Jean Pierre Delgadillo sent at 2/23/2023  8:20 AM CST -----  Regarding: Self [phone]  Type: RX Refill Request    Who Called:  Self     Have you contacted your pharmacy: no     Refill    RX Name and Strength: levothyroxine (SYNTHROID) 150 MCG tablet    Preferred Pharmacy with phone number:   [facility]  [place] - [address]  [address]  [place] [address]  Phone: [phone] Fax: [phone]    Local or Mail Order: local     Would the patient rather a call back or a response via My [facility]? Call back     Best Call Back Number: [phone]     Additional Information:     Thank you.

## 2023-02-28 ENCOUNTER — OFFICE VISIT (OUTPATIENT)
Dept: FAMILY MEDICINE | Facility: CLINIC | Age: 68
End: 2023-02-28
Payer: MEDICARE

## 2023-02-28 DIAGNOSIS — D75.1 POLYCYTHEMIA: ICD-10-CM

## 2023-02-28 DIAGNOSIS — E05.90 SUBCLINICAL HYPERTHYROIDISM: ICD-10-CM

## 2023-02-28 DIAGNOSIS — E78.1 HYPERTRIGLYCERIDEMIA: ICD-10-CM

## 2023-02-28 DIAGNOSIS — R73.03 PRE-DIABETES: ICD-10-CM

## 2023-02-28 DIAGNOSIS — E29.1 HYPOGONADISM IN MALE: ICD-10-CM

## 2023-02-28 DIAGNOSIS — R97.20 ELEVATED PSA: Primary | ICD-10-CM

## 2023-02-28 PROCEDURE — 99214 PR OFFICE/OUTPT VISIT, EST, LEVL IV, 30-39 MIN: ICD-10-PCS | Mod: HCWC,95,, | Performed by: FAMILY MEDICINE

## 2023-02-28 PROCEDURE — 99214 OFFICE O/P EST MOD 30 MIN: CPT | Mod: HCWC,95,, | Performed by: FAMILY MEDICINE

## 2023-02-28 NOTE — PROGRESS NOTES
Assessment & Plan  Elevated PSA  Hypogonadism in male  Discussed clinical significant of elevated PSA. Encouraged patient to keep f/u w/Urology in April. Will refrain from testosterone for now given risk of prostate cancer.     Subclinical hyperthyroidism  -     TSH; Future; Expected date: 02/28/2023  -     T3; Future; Expected date: 02/28/2023  -     T4, Free; Future; Expected date: 02/28/2023    Labs reviewed with patient. Continue current Synthroid dose since patient generally feels well on it.    Polycythemia  -     CBC Auto Differential; Future; Expected date: 02/28/2023    H&H mildly elevated. Repeat with other labs in 3 mo.    Pre-diabetes  -     Hemoglobin A1C; Future; Expected date: 02/28/2023    Hypertriglyceridemia  -     Lipid Panel; Future; Expected date: 02/28/2023    Discussed appropriate portioning of carbs, limitation of processed sugars and high fat foods. Patient was not fasting before his blood work, which could explain the elevation of trig. Repeat fasting labs in 3 mo.       The patient location is:  Patient Home   The chief complaint leading to consultation is: noted below  Visit type: Virtual visit with synchronous audio and video  Total time spent with patient: 20 minutes  Each patient to whom he or she provides medical services by telemedicine is:  (1) informed of the relationship between the physician and patient and the respective role of any other health care provider with respect to management of the patient; and (2) notified that he or she may decline to receive medical services by telemedicine and may withdraw from such care at any time.    Follow-up: already has f/u in August    ______________________________________________________________________    Chief Complaint  Chief Complaint   Patient presents with    Discuss labs       HPI  Anthony Joseph Rockweiler is a 67 y.o. male with multiple medical diagnoses as listed in the medical history and problem list that presents in a virtual  visit to discuss his most recent blood work, significant for an elevated PSA, low testosterone, subclinical hyperthyroidism, elevated triglycerides, pre-diabetes, polycythemia, and a mildly elevated ALT. Patient states that his prostate level has always been high since he had his thyroid removed for a mass on the thyroid. Patient has been off testosterone recently. He states that the testosterone has helped his migraines and chronic back pain significantly and therefore his quality of life is significantly affected by taking testosterone. He generally feels well on his current dose of Synthroid.       PAST MEDICAL HISTORY:  Past Medical History:   Diagnosis Date    Headache     migraines    Hx of psychiatric care     Hypertension     Psychiatric problem     Seizures     Therapy        PAST SURGICAL HISTORY:  History reviewed. No pertinent surgical history.    SOCIAL HISTORY:  Social History     Socioeconomic History    Marital status:     Number of children: 2   Tobacco Use    Smoking status: Never    Smokeless tobacco: Never   Substance and Sexual Activity    Alcohol use: No    Drug use: No    Sexual activity: Yes     Partners: Female     Birth control/protection: Condom       FAMILY HISTORY:  Family History   Problem Relation Age of Onset    No Known Problems Mother     No Known Problems Father     No Known Problems Sister     No Known Problems Brother     No Known Problems Maternal Aunt     No Known Problems Maternal Uncle     No Known Problems Paternal Aunt     No Known Problems Paternal Uncle     No Known Problems Maternal Grandmother     No Known Problems Maternal Grandfather     No Known Problems Paternal Grandmother     No Known Problems Paternal Grandfather     Suicide Other     Dementia Neg Hx        ALLERGIES AND MEDICATIONS: updated and reviewed.  Review of patient's allergies indicates:   Allergen Reactions    Paroxetine      Urinary problems     Current Outpatient Medications   Medication Sig  Dispense Refill    amLODIPine (NORVASC) 5 MG tablet Take 5 mg by mouth 2 (two) times daily.  3    carvedilol (COREG) 12.5 MG tablet Take 12.5 mg by mouth 2 (two) times daily with meals.      cyclobenzaprine (FLEXERIL) 10 MG tablet Take 10 mg by mouth every evening.      DULoxetine (CYMBALTA) 60 MG capsule Take 1 capsule (60 mg total) by mouth once daily. 90 capsule 1    ibuprofen (ADVIL,MOTRIN) 800 MG tablet TAKE 1 TABLET BY MOUTH THREE TIMES DAILY AS NEEDED WITH FOOD 90 tablet 0    levothyroxine (SYNTHROID) 150 MCG tablet Take 1 tablet (150 mcg total) by mouth once daily. 30 tablet 11    LORazepam (ATIVAN) 1 MG tablet Take 1 tablet (1 mg total) by mouth 2 (two) times daily as needed for Anxiety. 60 tablet 5    losartan (COZAAR) 100 MG tablet Take 100 mg by mouth once daily.      meclizine (ANTIVERT) 25 mg tablet SMARTSI Tablet(s) By Mouth Every 12 Hours PRN      ondansetron (ZOFRAN-ODT) 4 MG TbDL Take 1 tablet (4 mg total) by mouth every 12 (twelve) hours as needed (nausea). 30 tablet 1    pravastatin (PRAVACHOL) 20 MG tablet Take 20 mg by mouth once daily.      testosterone cypionate (DEPOTESTOTERONE CYPIONATE) 200 mg/mL injection INJECT 0.5 ML (CC) INTRAMUSCULARLY EVERY TWO WEEKS  5    valproic acid (DEPAKENE) 250 mg capsule Take 1 capsule by mouth 4 times daily 120 capsule 11     No current facility-administered medications for this visit.         ROS  Review of Systems   Constitutional:  Negative for activity change and unexpected weight change.   HENT:  Negative for hearing loss, rhinorrhea and trouble swallowing.    Eyes:  Negative for discharge and visual disturbance.   Respiratory:  Negative for chest tightness and wheezing.    Cardiovascular:  Negative for chest pain and palpitations.   Gastrointestinal:  Negative for blood in stool, constipation, diarrhea and vomiting.   Endocrine: Negative for polydipsia and polyuria.   Genitourinary:  Negative for difficulty urinating, hematuria and urgency.    Musculoskeletal:  Negative for arthralgias, joint swelling and neck pain.   Neurological:  Negative for weakness and headaches.   Psychiatric/Behavioral:  Negative for confusion and dysphoric mood.          Physical Exam  Physical Exam  Constitutional:       General: He is not in acute distress.  HENT:      Head: Normocephalic and atraumatic.   Neurological:      Mental Status: He is alert. Mental status is at baseline.   Psychiatric:         Mood and Affect: Mood normal.         Behavior: Behavior normal.       *Physical exam limited by virtual visit.    Health Maintenance         Date Due Completion Date    Shingles Vaccine (1 of 2) Never done ---    TETANUS VACCINE 09/09/2015 9/9/2005    COVID-19 Vaccine (4 - Booster for Pfizer series) 03/25/2022 1/28/2022    Pneumococcal Vaccines (Age 65+) (1 - PCV) Never done ---    Influenza Vaccine (1) Never done ---    Hemoglobin A1c (Diabetic Prevention Screening) 02/01/2026 2/1/2023    Colorectal Cancer Screening 10/02/2027 10/2/2017    Lipid Panel 02/01/2028 2/1/2023

## 2023-03-20 ENCOUNTER — OFFICE VISIT (OUTPATIENT)
Dept: NEUROLOGY | Facility: CLINIC | Age: 68
End: 2023-03-20
Payer: MEDICARE

## 2023-03-20 VITALS
SYSTOLIC BLOOD PRESSURE: 129 MMHG | HEIGHT: 68 IN | HEART RATE: 61 BPM | BODY MASS INDEX: 26.43 KG/M2 | DIASTOLIC BLOOD PRESSURE: 81 MMHG | WEIGHT: 174.38 LBS

## 2023-03-20 DIAGNOSIS — Z87.898 HISTORY OF SEIZURES: ICD-10-CM

## 2023-03-20 DIAGNOSIS — R51.9 NONINTRACTABLE EPISODIC HEADACHE, UNSPECIFIED HEADACHE TYPE: ICD-10-CM

## 2023-03-20 PROCEDURE — 1159F PR MEDICATION LIST DOCUMENTED IN MEDICAL RECORD: ICD-10-PCS | Mod: HCWC,CPTII,S$GLB, | Performed by: NEUROLOGICAL SURGERY

## 2023-03-20 PROCEDURE — 1159F MED LIST DOCD IN RCRD: CPT | Mod: HCWC,CPTII,S$GLB, | Performed by: NEUROLOGICAL SURGERY

## 2023-03-20 PROCEDURE — 3008F BODY MASS INDEX DOCD: CPT | Mod: HCWC,CPTII,S$GLB, | Performed by: NEUROLOGICAL SURGERY

## 2023-03-20 PROCEDURE — 99213 OFFICE O/P EST LOW 20 MIN: CPT | Mod: HCWC,S$GLB,, | Performed by: NEUROLOGICAL SURGERY

## 2023-03-20 PROCEDURE — 3079F PR MOST RECENT DIASTOLIC BLOOD PRESSURE 80-89 MM HG: ICD-10-PCS | Mod: HCWC,CPTII,S$GLB, | Performed by: NEUROLOGICAL SURGERY

## 2023-03-20 PROCEDURE — 99999 PR PBB SHADOW E&M-EST. PATIENT-LVL III: ICD-10-PCS | Mod: PBBFAC,HCWC,, | Performed by: NEUROLOGICAL SURGERY

## 2023-03-20 PROCEDURE — 4010F ACE/ARB THERAPY RXD/TAKEN: CPT | Mod: HCWC,CPTII,S$GLB, | Performed by: NEUROLOGICAL SURGERY

## 2023-03-20 PROCEDURE — 1126F AMNT PAIN NOTED NONE PRSNT: CPT | Mod: HCWC,CPTII,S$GLB, | Performed by: NEUROLOGICAL SURGERY

## 2023-03-20 PROCEDURE — 4010F PR ACE/ARB THEARPY RXD/TAKEN: ICD-10-PCS | Mod: HCWC,CPTII,S$GLB, | Performed by: NEUROLOGICAL SURGERY

## 2023-03-20 PROCEDURE — 3079F DIAST BP 80-89 MM HG: CPT | Mod: HCWC,CPTII,S$GLB, | Performed by: NEUROLOGICAL SURGERY

## 2023-03-20 PROCEDURE — 99999 PR PBB SHADOW E&M-EST. PATIENT-LVL III: CPT | Mod: PBBFAC,HCWC,, | Performed by: NEUROLOGICAL SURGERY

## 2023-03-20 PROCEDURE — 3074F SYST BP LT 130 MM HG: CPT | Mod: HCWC,CPTII,S$GLB, | Performed by: NEUROLOGICAL SURGERY

## 2023-03-20 PROCEDURE — 3044F PR MOST RECENT HEMOGLOBIN A1C LEVEL <7.0%: ICD-10-PCS | Mod: HCWC,CPTII,S$GLB, | Performed by: NEUROLOGICAL SURGERY

## 2023-03-20 PROCEDURE — 3074F PR MOST RECENT SYSTOLIC BLOOD PRESSURE < 130 MM HG: ICD-10-PCS | Mod: HCWC,CPTII,S$GLB, | Performed by: NEUROLOGICAL SURGERY

## 2023-03-20 PROCEDURE — 3044F HG A1C LEVEL LT 7.0%: CPT | Mod: HCWC,CPTII,S$GLB, | Performed by: NEUROLOGICAL SURGERY

## 2023-03-20 PROCEDURE — 1126F PR PAIN SEVERITY QUANTIFIED, NO PAIN PRESENT: ICD-10-PCS | Mod: HCWC,CPTII,S$GLB, | Performed by: NEUROLOGICAL SURGERY

## 2023-03-20 PROCEDURE — 3008F PR BODY MASS INDEX (BMI) DOCUMENTED: ICD-10-PCS | Mod: HCWC,CPTII,S$GLB, | Performed by: NEUROLOGICAL SURGERY

## 2023-03-20 PROCEDURE — 99213 PR OFFICE/OUTPT VISIT, EST, LEVL III, 20-29 MIN: ICD-10-PCS | Mod: HCWC,S$GLB,, | Performed by: NEUROLOGICAL SURGERY

## 2023-03-20 RX ORDER — VALPROIC ACID 250 MG/1
250 CAPSULE, LIQUID FILLED ORAL 4 TIMES DAILY
Qty: 120 CAPSULE | Refills: 11 | Status: SHIPPED | OUTPATIENT
Start: 2023-03-20

## 2023-03-20 RX ORDER — IBUPROFEN 800 MG/1
TABLET ORAL
Qty: 90 TABLET | Refills: 11 | Status: SHIPPED | OUTPATIENT
Start: 2023-03-20 | End: 2023-12-18

## 2023-03-20 RX ORDER — LORAZEPAM 1 MG/1
1 TABLET ORAL 2 TIMES DAILY PRN
Qty: 60 TABLET | Refills: 2 | Status: SHIPPED | OUTPATIENT
Start: 2023-03-20

## 2023-03-20 RX ORDER — IBUPROFEN 800 MG/1
TABLET ORAL
Qty: 90 TABLET | Refills: 0 | Status: SHIPPED | OUTPATIENT
Start: 2023-03-20 | End: 2023-03-20 | Stop reason: SDUPTHER

## 2023-03-20 NOTE — PROGRESS NOTES
Chief Complaint   Patient presents with    Head Injury        Anthony Joseph Rockweiler is a 67 y.o. male with a history of multiple medical diagnoses as listed below that presents to establish care for his history of a head injury. 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 He had multiple bone fractures and a head injury. He lost his sense of smell and taste and was unable to talk for quite some time. He says that he has been placed on seizure medication after the head injury, but he has been seizure free for quite some time. 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 He has been taking thyroid supplementation, but he feels that his medications have been needing some adjustments. Overall he feels that he has been much better but he still thinks there is some room for improvement in his quality of life overall.    Interval History  01/28/2022  He continues to try to keep himself physically active at the gym and to keep herself socially active as well in order to maintain his health.  He feels that he has been doing very well since he was last seen in clinic he has not had any new problems nor any new complaints.  Medications have been taking as directed without any complaints of side effects.    04/29/2022  He overall has been doing well and continues to try to focus on eating well and keeping himself physically active to keep himself well. He has been increasingly considering travelling abroad, but is worried about maintaining his communication with his providers during that time.    08/09/2022  He endorses a significant amount of psychosocial stressors since his last visit.  He said that he and his wife have had some disagreements about the care his wife's healing parents.  He says that he has been having some difficulty with  sleeping.  He was sleep in only be able to stay asleep for about an hour sometimes.  Other times he says that he sleeps and seems to drift off very quickly as he will start to have dreams which will wake him up from his sleep spontaneously.  He has not had any difficulty with initiating sleep only with maintenance.  He says that he does doze off frequently throughout day at unexpected and unwanted time.    03/20/2023  Overall he feels that he has been doing well since he was last seen in clinic.  He has been upset because his psychiatrist is no longer available to him.  He is working on trying to reestablish care.     PAST MEDICAL HISTORY:  Past Medical History:   Diagnosis Date    Headache     migraines    Hx of psychiatric care     Hypertension     Psychiatric problem     Seizures     Therapy        PAST SURGICAL HISTORY:  No past surgical history on file.    SOCIAL HISTORY:  Social History     Socioeconomic History    Marital status:     Number of children: 2   Tobacco Use    Smoking status: Never    Smokeless tobacco: Never   Substance and Sexual Activity    Alcohol use: No    Drug use: No    Sexual activity: Yes     Partners: Female     Birth control/protection: Condom       FAMILY HISTORY:  Family History   Problem Relation Age of Onset    No Known Problems Mother     No Known Problems Father     No Known Problems Sister     No Known Problems Brother     No Known Problems Maternal Aunt     No Known Problems Maternal Uncle     No Known Problems Paternal Aunt     No Known Problems Paternal Uncle     No Known Problems Maternal Grandmother     No Known Problems Maternal Grandfather     No Known Problems Paternal Grandmother     No Known Problems Paternal Grandfather     Suicide Other     Dementia Neg Hx        ALLERGIES AND MEDICATIONS: updated and reviewed.  Review of patient's allergies indicates:   Allergen Reactions    Paroxetine      Urinary problems     Current Outpatient  Medications   Medication Sig Dispense Refill    amLODIPine (NORVASC) 5 MG tablet Take 5 mg by mouth 2 (two) times daily.  3    carvedilol (COREG) 12.5 MG tablet Take 12.5 mg by mouth 2 (two) times daily with meals.      cyclobenzaprine (FLEXERIL) 10 MG tablet Take 10 mg by mouth every evening.      DULoxetine (CYMBALTA) 60 MG capsule Take 1 capsule (60 mg total) by mouth once daily. 90 capsule 1    ibuprofen (ADVIL,MOTRIN) 800 MG tablet TAKE 1 TABLET BY MOUTH THREE TIMES DAILY AS NEEDED WITH FOOD 90 tablet 11    levothyroxine (SYNTHROID) 150 MCG tablet Take 1 tablet (150 mcg total) by mouth once daily. 30 tablet 11    LORazepam (ATIVAN) 1 MG tablet Take 1 tablet (1 mg total) by mouth 2 (two) times daily as needed for Anxiety. 60 tablet 2    losartan (COZAAR) 100 MG tablet Take 100 mg by mouth once daily.      meclizine (ANTIVERT) 25 mg tablet SMARTSI Tablet(s) By Mouth Every 12 Hours PRN      ondansetron (ZOFRAN-ODT) 4 MG TbDL Take 1 tablet (4 mg total) by mouth every 12 (twelve) hours as needed (nausea). 30 tablet 1    pravastatin (PRAVACHOL) 20 MG tablet Take 20 mg by mouth once daily.      testosterone cypionate (DEPOTESTOTERONE CYPIONATE) 200 mg/mL injection INJECT 0.5 ML (CC) INTRAMUSCULARLY EVERY TWO WEEKS  5    valproic acid (DEPAKENE) 250 mg capsule Take 1 capsule (250 mg total) by mouth 4 (four) times daily. 120 capsule 11     No current facility-administered medications for this visit.       Review of Systems   Constitutional:  Positive for unexpected weight change. Negative for activity change and fatigue.   HENT:  Negative for trouble swallowing and voice change.    Eyes:  Negative for photophobia, pain and visual disturbance.   Respiratory:  Negative for apnea and shortness of breath.    Cardiovascular:  Negative for chest pain and palpitations.   Gastrointestinal:  Negative for constipation, nausea and vomiting.   Genitourinary:  Negative for difficulty urinating.   Musculoskeletal:   Negative for arthralgias, back pain, gait problem, myalgias and neck pain.   Skin:  Negative for color change and rash.   Neurological:  Positive for headaches. Negative for dizziness, seizures, syncope, speech difficulty, weakness, light-headedness and numbness.   Psychiatric/Behavioral:  Negative for agitation, behavioral problems and confusion.      Neurologic Exam     Mental Status   Oriented to person, place, and time.   Registration: recalls 3 of 3 objects.   Attention: normal. Concentration: normal.   Speech: speech is normal   Level of consciousness: alert  Knowledge: good.     Cranial Nerves     CN II   Right visual field deficit: none  Left visual field deficit: none     CN III, IV, VI   Pupils are equal, round, and reactive to light.  Extraocular motions are normal.   Right pupil: Size: 3 mm. Shape: regular.   Left pupil: Size: 3 mm. Shape: regular.   CN III: no CN III palsy  CN VI: no CN VI palsy  Nystagmus: none   Diplopia: none  Ophthalmoparesis: none  Upgaze: normal  Downgaze: normal  Conjugate gaze: present    CN VII   Facial expression full, symmetric.   Right facial weakness: none  Left facial weakness: none    CN VIII   CN VIII normal.     CN XI   CN XI normal.     CN XII   CN XII normal.   Tongue deviation: none    Motor Exam   Muscle bulk: normal  Overall muscle tone: normal  Right arm tone: normal  Left arm tone: normal  Right leg tone: normal  Left leg tone: normal    Gait, Coordination, and Reflexes     Gait  Gait: normal    Coordination   Finger to nose coordination: normal    Tremor   Resting tremor: absent    Physical Exam  Constitutional:       Appearance: He is well-developed.   HENT:      Head: Normocephalic and atraumatic.   Eyes:      Extraocular Movements: EOM normal.      Pupils: Pupils are equal, round, and reactive to light.   Pulmonary:      Effort: Pulmonary effort is normal. No respiratory distress.   Musculoskeletal:         General: Normal range of motion.   Neurological:      " Mental Status: He is alert and oriented to person, place, and time.      Coordination: Finger-Nose-Finger Test normal.      Gait: Gait is intact.   Psychiatric:         Speech: Speech normal.         Behavior: Behavior normal.       Vitals:    03/20/23 0927   BP: 129/81   Pulse: 61   Weight: 79.1 kg (174 lb 6.1 oz)   Height: 5' 8" (1.727 m)       Assessment & Plan:    Problem List Items Addressed This Visit       History of seizures    Overview     Likely provoked seizures from his history of traumatic head injury.  Patient was likely always in the antiepileptic medications to reduce the risk of recurrent seizures.  Discuss transitioning from valproic acid to topiramate to reduce the potential for drug drug interactions and undesired side effects including but not limited to weight gain.         Relevant Medications    LORazepam (ATIVAN) 1 MG tablet    valproic acid (DEPAKENE) 250 mg capsule    Headache    Overview     Posttraumatic headaches.  Currently helped with his current dose of valproic acid.  Discussed transitioning to topiramate         Relevant Medications    ibuprofen (ADVIL,MOTRIN) 800 MG tablet         Follow-up: No follow-ups on file.    This note was done with the assistance of voice recognition software. Some errors may be present after proofreading.                "

## 2023-04-11 ENCOUNTER — OFFICE VISIT (OUTPATIENT)
Dept: UROLOGY | Facility: CLINIC | Age: 68
End: 2023-04-11
Payer: MEDICARE

## 2023-04-11 VITALS — BODY MASS INDEX: 25.73 KG/M2 | WEIGHT: 169.19 LBS

## 2023-04-11 DIAGNOSIS — E29.1 HYPOGONADISM IN MALE: ICD-10-CM

## 2023-04-11 DIAGNOSIS — R97.20 ELEVATED PSA: Primary | ICD-10-CM

## 2023-04-11 PROCEDURE — 1126F AMNT PAIN NOTED NONE PRSNT: CPT | Mod: HCWC,CPTII,S$GLB, | Performed by: UROLOGY

## 2023-04-11 PROCEDURE — 1126F PR PAIN SEVERITY QUANTIFIED, NO PAIN PRESENT: ICD-10-PCS | Mod: HCWC,CPTII,S$GLB, | Performed by: UROLOGY

## 2023-04-11 PROCEDURE — 99204 PR OFFICE/OUTPT VISIT, NEW, LEVL IV, 45-59 MIN: ICD-10-PCS | Mod: HCWC,S$GLB,, | Performed by: UROLOGY

## 2023-04-11 PROCEDURE — 1160F RVW MEDS BY RX/DR IN RCRD: CPT | Mod: HCWC,CPTII,S$GLB, | Performed by: UROLOGY

## 2023-04-11 PROCEDURE — 1159F MED LIST DOCD IN RCRD: CPT | Mod: HCWC,CPTII,S$GLB, | Performed by: UROLOGY

## 2023-04-11 PROCEDURE — 99204 OFFICE O/P NEW MOD 45 MIN: CPT | Mod: HCWC,S$GLB,, | Performed by: UROLOGY

## 2023-04-11 PROCEDURE — 99999 PR PBB SHADOW E&M-EST. PATIENT-LVL III: CPT | Mod: PBBFAC,HCWC,, | Performed by: UROLOGY

## 2023-04-11 PROCEDURE — 3044F HG A1C LEVEL LT 7.0%: CPT | Mod: HCWC,CPTII,S$GLB, | Performed by: UROLOGY

## 2023-04-11 PROCEDURE — 3044F PR MOST RECENT HEMOGLOBIN A1C LEVEL <7.0%: ICD-10-PCS | Mod: HCWC,CPTII,S$GLB, | Performed by: UROLOGY

## 2023-04-11 PROCEDURE — 1101F PT FALLS ASSESS-DOCD LE1/YR: CPT | Mod: HCWC,CPTII,S$GLB, | Performed by: UROLOGY

## 2023-04-11 PROCEDURE — 4010F ACE/ARB THERAPY RXD/TAKEN: CPT | Mod: HCWC,CPTII,S$GLB, | Performed by: UROLOGY

## 2023-04-11 PROCEDURE — 3288F FALL RISK ASSESSMENT DOCD: CPT | Mod: HCWC,CPTII,S$GLB, | Performed by: UROLOGY

## 2023-04-11 PROCEDURE — 1160F PR REVIEW ALL MEDS BY PRESCRIBER/CLIN PHARMACIST DOCUMENTED: ICD-10-PCS | Mod: HCWC,CPTII,S$GLB, | Performed by: UROLOGY

## 2023-04-11 PROCEDURE — 1101F PR PT FALLS ASSESS DOC 0-1 FALLS W/OUT INJ PAST YR: ICD-10-PCS | Mod: HCWC,CPTII,S$GLB, | Performed by: UROLOGY

## 2023-04-11 PROCEDURE — 3008F BODY MASS INDEX DOCD: CPT | Mod: HCWC,CPTII,S$GLB, | Performed by: UROLOGY

## 2023-04-11 PROCEDURE — 99999 PR PBB SHADOW E&M-EST. PATIENT-LVL III: ICD-10-PCS | Mod: PBBFAC,HCWC,, | Performed by: UROLOGY

## 2023-04-11 PROCEDURE — 3008F PR BODY MASS INDEX (BMI) DOCUMENTED: ICD-10-PCS | Mod: HCWC,CPTII,S$GLB, | Performed by: UROLOGY

## 2023-04-11 PROCEDURE — 4010F PR ACE/ARB THEARPY RXD/TAKEN: ICD-10-PCS | Mod: HCWC,CPTII,S$GLB, | Performed by: UROLOGY

## 2023-04-11 PROCEDURE — 3288F PR FALLS RISK ASSESSMENT DOCUMENTED: ICD-10-PCS | Mod: HCWC,CPTII,S$GLB, | Performed by: UROLOGY

## 2023-04-11 PROCEDURE — 1159F PR MEDICATION LIST DOCUMENTED IN MEDICAL RECORD: ICD-10-PCS | Mod: HCWC,CPTII,S$GLB, | Performed by: UROLOGY

## 2023-04-11 RX ORDER — CIPROFLOXACIN 500 MG/1
500 TABLET ORAL 2 TIMES DAILY
Qty: 6 TABLET | Refills: 0 | Status: SHIPPED | OUTPATIENT
Start: 2023-04-11 | End: 2023-04-14

## 2023-04-11 NOTE — PROGRESS NOTES
Subjective:       Patient ID: Anthony Joseph Rockweiler is a 68 y.o. male who was referred by Myla Bryson DO    Chief Complaint:   Chief Complaint   Patient presents with    Elevated PSA       Elevated PSA  Patient is here with an elevated PSA. He has no personal history and no family history of prostate cancer. His AUA Symptom Score is 0/0, manifested as none . He has a prior genitourinary history of hypogonadism.  Previous PSA values are :  Lab Results   Component Value Date    PSA 29.5 (H) 02/01/2023     [facility]    Question 4/8/2023  8:09 PM CDT - Filed by Patient   Over the past months, have you had:     Incomplete emptying: How often have you had the sensation of not emptying your bladder completely after you finished urinating?  0-None   Frquency: How often have you had to urinate again less than two hours after you finished urinating? 0-Not at all   Intermittency: How often have you found you stopped and started again several times when you urinated? 0-Not at all   Urgency: How often do you find it difficult to postpone urination? 0-Not at all   Weak stream: How often have you had a weak urinary stream? 0-Not at all   Straining: How often have you had to push or strain to begin urination? 0-Not at all   Sleeping: How many times did you most typically get up to urinate from the time you went to bed at night until the time you got up in the morning?  0 - None   Quality of life due to urinary symptoms: If you were to spend the rest of your life with your urinary condition the way it is now, how would you feel about that?  1-Pleased   IPSS Score  (range: 0 - 35) 0 (Mild symptoms)     He takes testosterone cypionate 200 mg every week.  He has a history of a traumatic brain injury from many years ago.  He has been on testosterone replacement for several years.  He tells me he receives the testosterone from a  friend.  He feels as though testosterone replacement helps with his chronic  migraines as well as backaches as well as ankle pains.  He feels his testosterone replacement is an important part of his quality of life and is very hesitant to stop.        ACTIVE MEDICAL ISSUES:  Patient Active Problem List   Diagnosis    History of seizures    Headache    Nuclear sclerosis of both eyes    Moderate benzodiazepine use disorder       PAST MEDICAL HISTORY  Past Medical History:   Diagnosis Date    Headache     migraines    Hx of psychiatric care     Hypertension     Psychiatric problem     Seizures     Therapy        PAST SURGICAL HISTORY:  History reviewed. No pertinent surgical history.    SOCIAL HISTORY:  Social History     Tobacco Use    Smoking status: Never    Smokeless tobacco: Never   Substance Use Topics    Alcohol use: No    Drug use: No       FAMILY HISTORY:  Family History   Problem Relation Age of Onset    No Known Problems Mother     No Known Problems Father     No Known Problems Sister     No Known Problems Brother     No Known Problems Maternal Aunt     No Known Problems Maternal Uncle     No Known Problems Paternal Aunt     No Known Problems Paternal Uncle     No Known Problems Maternal Grandmother     No Known Problems Maternal Grandfather     No Known Problems Paternal Grandmother     No Known Problems Paternal Grandfather     Suicide Other     Dementia Neg Hx        ALLERGIES AND MEDICATIONS: updated and reviewed.  Review of patient's allergies indicates:   Allergen Reactions    Paroxetine      Urinary problems     Current Outpatient Medications   Medication Sig    amLODIPine (NORVASC) 5 MG tablet Take 5 mg by mouth 2 (two) times daily.    carvedilol (COREG) 12.5 MG tablet Take 12.5 mg by mouth 2 (two) times daily with meals.    DULoxetine (CYMBALTA) 60 MG capsule Take 1 capsule (60 mg total) by mouth once daily.    ibuprofen (ADVIL,MOTRIN) 800 MG tablet TAKE 1 TABLET BY MOUTH THREE TIMES DAILY AS NEEDED WITH FOOD    levothyroxine (SYNTHROID) 150 MCG tablet Take 1 tablet (150 mcg  total) by mouth once daily.    LORazepam (ATIVAN) 1 MG tablet Take 1 tablet (1 mg total) by mouth 2 (two) times daily as needed for Anxiety.    losartan (COZAAR) 100 MG tablet Take 100 mg by mouth once daily.    ondansetron (ZOFRAN-ODT) 4 MG TbDL Take 1 tablet (4 mg total) by mouth every 12 (twelve) hours as needed (nausea).    pravastatin (PRAVACHOL) 20 MG tablet Take 20 mg by mouth once daily.    testosterone cypionate (DEPOTESTOTERONE CYPIONATE) 200 mg/mL injection INJECT 0.5 ML (CC) INTRAMUSCULARLY EVERY TWO WEEKS    valproic acid (DEPAKENE) 250 mg capsule Take 1 capsule (250 mg total) by mouth 4 (four) times daily.    ciprofloxacin HCl (CIPRO) 500 MG tablet Take 1 tablet (500 mg total) by mouth 2 (two) times daily. for 6 doses    cyclobenzaprine (FLEXERIL) 10 MG tablet Take 10 mg by mouth every evening.    meclizine (ANTIVERT) 25 mg tablet SMARTSI Tablet(s) By Mouth Every 12 Hours PRN     No current facility-administered medications for this visit.       Review of Systems   Constitutional:  Negative for chills and fever.   HENT:  Negative for congestion.    Respiratory:  Negative for chest tightness and shortness of breath.    Cardiovascular:  Negative for chest pain and palpitations.   Gastrointestinal:  Negative for abdominal pain, constipation, diarrhea, nausea and vomiting.   Genitourinary:  Negative for difficulty urinating, dysuria, flank pain, hematuria and urgency.   Musculoskeletal:  Negative for arthralgias.   Neurological:  Negative for dizziness.   Psychiatric/Behavioral:  Negative for confusion.      Objective:      Vitals:    23 1135   Weight: 76.8 kg (169 lb 3.3 oz)     Physical Exam  Vitals and nursing note reviewed.   Constitutional:       Appearance: He is well-developed.   HENT:      Head: Normocephalic.   Eyes:      Conjunctiva/sclera: Conjunctivae normal.   Neck:      Thyroid: No thyromegaly.      Trachea: No tracheal deviation.   Cardiovascular:      Rate and Rhythm: Normal rate.       Heart sounds: Normal heart sounds.   Pulmonary:      Effort: Pulmonary effort is normal. No respiratory distress.      Breath sounds: Normal breath sounds. No wheezing.   Abdominal:      General: Bowel sounds are normal.      Palpations: Abdomen is soft.      Tenderness: There is no abdominal tenderness. There is no rebound.      Hernia: No hernia is present.   Genitourinary:     Prostate: Enlarged. Not tender.      Rectum: Normal.      Comments: 50 gm right base firm  Musculoskeletal:         General: No tenderness. Normal range of motion.      Cervical back: Normal range of motion and neck supple.   Lymphadenopathy:      Cervical: No cervical adenopathy.   Skin:     General: Skin is warm and dry.      Findings: No erythema or rash.   Neurological:      Mental Status: He is alert and oriented to person, place, and time.   Psychiatric:         Behavior: Behavior normal.         Thought Content: Thought content normal.         Judgment: Judgment normal.       Urine dipstick shows not done.  Micro exam: not done.    Component Ref Range & Units 2 mo ago   PSA, Screen 0.00 - 4.00 ng/mL 29.5 High     Comment: The testing method is a chemiluminescent microparticle immunoassay   manufactured by [employer] and performed on the [employer]   or   Spectral Image system. Values obtained with different assay manufacturers   for   methods may be different and cannot be used interchangeably.   PSA Expected levels:   Hormonal Therapy: <0.05 ng/ml   Prostatectomy: <0.01 ng/ml   Radiation Therapy: <1.00 ng/ml    Resulting Agency  [facility]           Narrative  Performed by: [facility]  Release to patient->Immediate      Specimen Collected: 02/01/23 13:05 Last Resulted: 02/01/23 19:53           Component Ref Range & Units 2 mo ago   Testosterone 250 - 1100 ng/dL 92 Low     Comment: Men with clinically significant hypogonadal symptoms   and testosterone values repeatedly in the range of the   200-300 ng/dL or less, may benefit from  testosterone   treatment after adequate risk and benefits counseling.     For additional information, please refer to   http://education.Kudo.7fgame/faq/TotalTestosteroneLCMSMS   (This link is being provided for informational/   educational purposes only.)     This test was developed and its analytical performance   characteristics have been determined by [employer]. It has not been cleared or approved by the   FDA. This assay has been validated pursuant to the CLIA   regulations and is used for clinical purposes.     TEST PERFORMED AT:   [facility]   [address] [place] [address]   SHAHBAZ REED MD    Testosterone, Free 46.0 - 224.0 pg/mL 20.5 Low     Testosterone, Bioavailable 110.0 - 575.0 ng/dL 42.2 Low     Sex Hormone Binding Globulin 22 - 77 nmol/L 13 Low     Albumin 3.6 - 5.1 g/dL 4.5    Resulting Agency  [facility]              Specimen Collected: 02/01/23 13:05 Last Resulted: 02/08/23 15:54             Assessment:       1. Elevated PSA    2. Hypogonadism in male          Plan:       1. Elevated PSA  He understands that a prostate biopsy is indicated for definitive diagnosis of prostate cancer. Risks, benefits, and alternative of TRUS PBx were discussed thoroughly. Risks include, but are not limited to, pain, bleeding, infection, and sepsis. His pre-procedure regimen would require enema the morning of PBx and appropriate PO antibiotics for 3 days starting the day prior to procedure. He will also receive IM injection of antibiotics immediately before the procedure. He understands even after a prostate biopsy, prostate cancer can be missed and close follow up is necessary, with possible further imaging and/or repeat biopsy in the future.     - Ambulatory referral/consult to Urology  - ciprofloxacin HCl (CIPRO) 500 MG tablet; Take 1 tablet (500 mg total) by mouth 2 (two) times daily. for 6 doses  Dispense: 6 tablet; Refill: 0  - US Biopsy Prostate Singl Multi  Specimens (xpd); Future    2. Hypogonadism in male  I recommend he stop testosterone     - Ambulatory referral/consult to Urology            Follow up for Prostate Biopsy.

## 2023-04-28 ENCOUNTER — TELEPHONE (OUTPATIENT)
Dept: UROLOGY | Facility: CLINIC | Age: 68
End: 2023-04-28

## 2023-04-28 NOTE — TELEPHONE ENCOUNTER
I spoke with pt and re-iterated instructions as far as prep for a prostate biopsy and how to take medications. He verbalized understanding.

## 2023-05-02 ENCOUNTER — PROCEDURE VISIT (OUTPATIENT)
Dept: UROLOGY | Facility: CLINIC | Age: 68
End: 2023-05-02
Attending: UROLOGY
Payer: MEDICARE

## 2023-05-02 VITALS — WEIGHT: 171.31 LBS | BODY MASS INDEX: 26.05 KG/M2

## 2023-05-02 DIAGNOSIS — R97.20 ELEVATED PSA: Primary | ICD-10-CM

## 2023-05-02 PROCEDURE — G0416 PROSTATE BIOPSY, ANY MTHD: ICD-10-PCS | Mod: 26,HCWC,, | Performed by: PATHOLOGY

## 2023-05-02 PROCEDURE — 76872 TRUS: ICD-10-PCS | Mod: 26,HCWC,S$GLB, | Performed by: UROLOGY

## 2023-05-02 PROCEDURE — G0416 PROSTATE BIOPSY, ANY MTHD: HCPCS | Mod: 26,HCWC,, | Performed by: PATHOLOGY

## 2023-05-02 PROCEDURE — 96372 PR INJECTION,THERAP/PROPH/DIAG2ST, IM OR SUBCUT: ICD-10-PCS | Mod: HCWC,59,S$GLB, | Performed by: UROLOGY

## 2023-05-02 PROCEDURE — 76872 US TRANSRECTAL: CPT | Mod: 26,HCWC,S$GLB, | Performed by: UROLOGY

## 2023-05-02 PROCEDURE — 55700 TRUS: CPT | Mod: HCWC,S$GLB,, | Performed by: UROLOGY

## 2023-05-02 PROCEDURE — 88305 TISSUE EXAM BY PATHOLOGIST: CPT | Mod: HCWC | Performed by: PATHOLOGY

## 2023-05-02 PROCEDURE — 96372 THER/PROPH/DIAG INJ SC/IM: CPT | Mod: HCWC,59,S$GLB, | Performed by: UROLOGY

## 2023-05-02 PROCEDURE — 55700 TRUS: ICD-10-PCS | Mod: HCWC,S$GLB,, | Performed by: UROLOGY

## 2023-05-02 RX ORDER — GENTAMICIN SULFATE 40 MG/ML
80 INJECTION, SOLUTION INTRAMUSCULAR; INTRAVENOUS
Status: COMPLETED | OUTPATIENT
Start: 2023-05-02 | End: 2023-05-02

## 2023-05-02 RX ADMIN — GENTAMICIN SULFATE 80 MG: 40 INJECTION, SOLUTION INTRAMUSCULAR; INTRAVENOUS at 12:05

## 2023-05-02 NOTE — PROCEDURES
"TRUS    Date/Time: 5/2/2023 11:00 AM  Performed by: José Luis Foley MD  Authorized by: José Luis Foley MD     Consent Done?:  Yes (Written)  Time out: Immediately prior to procedure a "time out" was called to verify the correct patient, procedure, equipment, support staff and site/side marked as required.    Indications: Elevated PSA    Preparation: Patient was prepped and draped in usual sterile fashion    Position:  Left lateral  Anesthesia:  10cc's 1% Lidocaine and Lidocaine jelly  Patient sedated: No    Prostate Size:  60.37 cc  Lesions:: No    Left Base Biopsies: 2  Left Mid Biopsies: 2  Left [place] Biopsies: 2  Right Base Biopsies: 2  Right Mid Biopsies: 2  Right [place] Biopsies: 2  Transitional zone: No    Total Biopsies:  12    Patient tolerance:  Patient tolerated the procedure well with no immediate complications     PSA 29.5  PSAD 0.49  "

## 2023-05-05 ENCOUNTER — TELEPHONE (OUTPATIENT)
Dept: UROLOGY | Facility: CLINIC | Age: 68
End: 2023-05-05

## 2023-05-05 LAB
FINAL PATHOLOGIC DIAGNOSIS: NORMAL
GROSS: NORMAL
Lab: NORMAL

## 2023-05-05 NOTE — TELEPHONE ENCOUNTER
The pt called the office with questions concerning his biopsy results. I gave the pt the date and time for which he was scheduled to come in and go over the results. I explained to him that I could not interpret the results for him and he would have to wait until his appointment with Dr. Foley. He verbalized understanding.

## 2023-05-08 ENCOUNTER — TELEPHONE (OUTPATIENT)
Dept: UROLOGY | Facility: CLINIC | Age: 68
End: 2023-05-08

## 2023-05-08 NOTE — TELEPHONE ENCOUNTER
----- Message from Usha Lopez RN sent at 5/8/2023  4:19 PM CDT -----  Regarding: FW: Request for Test Results  Pt called Friday and now today for his bx results.  He has an appt next Tuesday.  Please advise.  ----- Message -----  From: Lilly Peterson  Sent: 5/8/2023  12:32 PM CDT  To: Og Ordonez Staff  Subject: Request for Test Results                         Type:  Test Results    Who Called: Self     Name of Test (Lab/Mammo/Etc): Ultrasound     Date of Test: 5/2/23    Ordering Provider: Dr. Foley     Where the test was performed: [facility]    Would the patient rather a call back or a response via [facility]? Call     Best Call Back Number: [phone]      Additional Information:      For Clinical Team:Has the provider reviewed the results?

## 2023-05-08 NOTE — TELEPHONE ENCOUNTER
I do not review biopsy results over the phone.  I like to have a face to face discussion.  Please see if there is a sooner appointment for him.

## 2023-05-12 ENCOUNTER — OFFICE VISIT (OUTPATIENT)
Dept: UROLOGY | Facility: CLINIC | Age: 68
End: 2023-05-12
Payer: MEDICARE

## 2023-05-12 VITALS — WEIGHT: 170.06 LBS | BODY MASS INDEX: 25.86 KG/M2

## 2023-05-12 DIAGNOSIS — C61 PROSTATE CANCER: Primary | ICD-10-CM

## 2023-05-12 DIAGNOSIS — R35.1 NOCTURIA: ICD-10-CM

## 2023-05-12 DIAGNOSIS — N52.8 OTHER MALE ERECTILE DYSFUNCTION: ICD-10-CM

## 2023-05-12 LAB
BILIRUB SERPL-MCNC: NEGATIVE MG/DL
BLOOD URINE, POC: NORMAL
COLOR, POC UA: YELLOW
GLUCOSE UR QL STRIP: NORMAL
KETONES UR QL STRIP: NEGATIVE
LEUKOCYTE ESTERASE URINE, POC: NEGATIVE
NITRITE, POC UA: NEGATIVE
PH, POC UA: 5
PROTEIN, POC: 30
SPECIFIC GRAVITY, POC UA: 1030
UROBILINOGEN, POC UA: NORMAL

## 2023-05-12 PROCEDURE — 4010F ACE/ARB THERAPY RXD/TAKEN: CPT | Mod: HCWC,CPTII,S$GLB, | Performed by: UROLOGY

## 2023-05-12 PROCEDURE — 3288F FALL RISK ASSESSMENT DOCD: CPT | Mod: HCWC,CPTII,S$GLB, | Performed by: UROLOGY

## 2023-05-12 PROCEDURE — 1126F PR PAIN SEVERITY QUANTIFIED, NO PAIN PRESENT: ICD-10-PCS | Mod: HCWC,CPTII,S$GLB, | Performed by: UROLOGY

## 2023-05-12 PROCEDURE — 3044F PR MOST RECENT HEMOGLOBIN A1C LEVEL <7.0%: ICD-10-PCS | Mod: HCWC,CPTII,S$GLB, | Performed by: UROLOGY

## 2023-05-12 PROCEDURE — 4010F PR ACE/ARB THEARPY RXD/TAKEN: ICD-10-PCS | Mod: HCWC,CPTII,S$GLB, | Performed by: UROLOGY

## 2023-05-12 PROCEDURE — 99214 PR OFFICE/OUTPT VISIT, EST, LEVL IV, 30-39 MIN: ICD-10-PCS | Mod: HCWC,S$GLB,, | Performed by: UROLOGY

## 2023-05-12 PROCEDURE — 3008F BODY MASS INDEX DOCD: CPT | Mod: HCWC,CPTII,S$GLB, | Performed by: UROLOGY

## 2023-05-12 PROCEDURE — 81001 URINALYSIS AUTO W/SCOPE: CPT | Mod: HCWC,S$GLB,, | Performed by: UROLOGY

## 2023-05-12 PROCEDURE — 1159F PR MEDICATION LIST DOCUMENTED IN MEDICAL RECORD: ICD-10-PCS | Mod: HCWC,CPTII,S$GLB, | Performed by: UROLOGY

## 2023-05-12 PROCEDURE — 99999 PR PBB SHADOW E&M-EST. PATIENT-LVL III: CPT | Mod: PBBFAC,HCWC,, | Performed by: UROLOGY

## 2023-05-12 PROCEDURE — 3288F PR FALLS RISK ASSESSMENT DOCUMENTED: ICD-10-PCS | Mod: HCWC,CPTII,S$GLB, | Performed by: UROLOGY

## 2023-05-12 PROCEDURE — 1126F AMNT PAIN NOTED NONE PRSNT: CPT | Mod: HCWC,CPTII,S$GLB, | Performed by: UROLOGY

## 2023-05-12 PROCEDURE — 1160F PR REVIEW ALL MEDS BY PRESCRIBER/CLIN PHARMACIST DOCUMENTED: ICD-10-PCS | Mod: HCWC,CPTII,S$GLB, | Performed by: UROLOGY

## 2023-05-12 PROCEDURE — 81001 POCT URINALYSIS, DIPSTICK OR TABLET REAGENT, AUTOMATED, WITH MICROSCOP: ICD-10-PCS | Mod: HCWC,S$GLB,, | Performed by: UROLOGY

## 2023-05-12 PROCEDURE — 1101F PT FALLS ASSESS-DOCD LE1/YR: CPT | Mod: HCWC,CPTII,S$GLB, | Performed by: UROLOGY

## 2023-05-12 PROCEDURE — 99214 OFFICE O/P EST MOD 30 MIN: CPT | Mod: HCWC,S$GLB,, | Performed by: UROLOGY

## 2023-05-12 PROCEDURE — 99999 PR PBB SHADOW E&M-EST. PATIENT-LVL III: ICD-10-PCS | Mod: PBBFAC,HCWC,, | Performed by: UROLOGY

## 2023-05-12 PROCEDURE — 1101F PR PT FALLS ASSESS DOC 0-1 FALLS W/OUT INJ PAST YR: ICD-10-PCS | Mod: HCWC,CPTII,S$GLB, | Performed by: UROLOGY

## 2023-05-12 PROCEDURE — 1159F MED LIST DOCD IN RCRD: CPT | Mod: HCWC,CPTII,S$GLB, | Performed by: UROLOGY

## 2023-05-12 PROCEDURE — 3008F PR BODY MASS INDEX (BMI) DOCUMENTED: ICD-10-PCS | Mod: HCWC,CPTII,S$GLB, | Performed by: UROLOGY

## 2023-05-12 PROCEDURE — 3044F HG A1C LEVEL LT 7.0%: CPT | Mod: HCWC,CPTII,S$GLB, | Performed by: UROLOGY

## 2023-05-12 PROCEDURE — 1160F RVW MEDS BY RX/DR IN RCRD: CPT | Mod: HCWC,CPTII,S$GLB, | Performed by: UROLOGY

## 2023-05-12 RX ORDER — SILDENAFIL 100 MG/1
100 TABLET, FILM COATED ORAL DAILY PRN
Qty: 30 TABLET | Refills: 11 | Status: SHIPPED | OUTPATIENT
Start: 2023-05-12 | End: 2024-01-25 | Stop reason: SDUPTHER

## 2023-05-12 NOTE — PROGRESS NOTES
Subjective:       Patient ID: Anthony Joseph Rockweiler is a 68 y.o. male who was last seen in this office 5/2/2023    Chief Complaint:   Chief Complaint   Patient presents with    Follow-up       Follow Up Prostate Biopsy    He underwent a prostate needle biopsy on 5/2/2023.  His biopsy was indicated due to: Elevated PSA.  Afterwards he experienced: Gross Hematuria.  These symptoms have resolved.  His PSA prior to biopsy was 29.5.  PSA Density 0.45. His prostate size was 60 grams.  The ultrasound did not show a median lobe.  He currently does have erectile dysfunction.  His pathology showed: Prostate Cancer.     ACTIVE MEDICAL ISSUES:  Patient Active Problem List   Diagnosis    History of seizures    Headache    Nuclear sclerosis of both eyes    Moderate benzodiazepine use disorder       ALLERGIES AND MEDICATIONS: updated and reviewed.  Review of patient's allergies indicates:   Allergen Reactions    Paroxetine      Urinary problems     Current Outpatient Medications   Medication Sig    amLODIPine (NORVASC) 5 MG tablet Take 5 mg by mouth 2 (two) times daily.    carvedilol (COREG) 12.5 MG tablet Take 12.5 mg by mouth 2 (two) times daily with meals.    ibuprofen (ADVIL,MOTRIN) 800 MG tablet TAKE 1 TABLET BY MOUTH THREE TIMES DAILY AS NEEDED WITH FOOD    levothyroxine (SYNTHROID) 150 MCG tablet Take 1 tablet (150 mcg total) by mouth once daily.    LORazepam (ATIVAN) 1 MG tablet Take 1 tablet (1 mg total) by mouth 2 (two) times daily as needed for Anxiety.    losartan (COZAAR) 100 MG tablet Take 100 mg by mouth once daily.    ondansetron (ZOFRAN-ODT) 4 MG TbDL Take 1 tablet (4 mg total) by mouth every 12 (twelve) hours as needed (nausea).    pravastatin (PRAVACHOL) 20 MG tablet Take 20 mg by mouth once daily.    valproic acid (DEPAKENE) 250 mg capsule Take 1 capsule (250 mg total) by mouth 4 (four) times daily.    cyclobenzaprine (FLEXERIL) 10 MG tablet Take 10 mg by mouth every evening.    DULoxetine (CYMBALTA) 60 MG  capsule Take 1 capsule (60 mg total) by mouth once daily.    meclizine (ANTIVERT) 25 mg tablet SMARTSI Tablet(s) By Mouth Every 12 Hours PRN    sildenafiL (VIAGRA) 100 MG tablet Take 1 tablet (100 mg total) by mouth daily as needed.     No current facility-administered medications for this visit.       Review of Systems    Objective:      Vitals:    23 1431   Weight: 77.2 kg (170 lb 1.4 oz)     Physical Exam    Urine dipstick shows negative for all components.  Micro exam: negative for WBC's or RBC's.    Collected: 23 1119   Result status: Final   Resulting lab: [facility]   Value: 1. Prostate, left base lateral, biopsy:   Benign prostatic tissue     2. Prostate, left base medial, biopsy:   Benign prostatic tissue     3. Prostate, left mid lateral, biopsy:   Benign prostatic tissue     4. Prostate, left mid medial, biopsy:   Benign prostatic tissue     5. Prostate, left apex lateral, biopsy:   Atypical small acinar proliferation (ASAP)     6. Prostate, left apex medial, biopsy:   Benign prostatic tissue     7. Prostate, right base lateral, biopsy:   Benign fibrous tissue     8. Prostate, right base medial, biopsy:   Benign prostatic tissue     9. Prostate, right mid lateral, biopsy:   Benign prostatic tissue     10. Prostate, right mid medial, biopsy:   Prostatic adenocarcinoma, Grade Group 1, Citlali score (3+3)=6   The tumor is present in 1/1 cores   The tumor measures 1 mm in length, approximately 11% of total prostatic tissue   No perineural invasion identified     11. Prostate, right apex lateral, biopsy:   Benign prostatic tissue     12. Prostate, right apex medial, biopsy:   Benign prostatic tissue    Comment: Interp By Jessica Granados D.O., Signed on 2023 at 13:34       Assessment:       1. Prostate cancer    2. Nocturia    3. Other male erectile dysfunction          Plan:       1. Prostate cancer  He is high risk    - MRI Prostate W W/O Contrast;  Future  - NM PET CT F 18 PYL PSMA, [facility] to [facility]; Future  - Prostate Specific Antigen, Diagnostic; Future    2. Nocturia    - POCT urinalysis, dipstick or tablet reag    3. Other male erectile dysfunction  Viagra            Follow up in about 6 weeks (around 6/23/2023).

## 2023-05-30 ENCOUNTER — PES CALL (OUTPATIENT)
Dept: ADMINISTRATIVE | Facility: CLINIC | Age: 68
End: 2023-05-30

## 2023-06-19 ENCOUNTER — HOSPITAL ENCOUNTER (OUTPATIENT)
Dept: RADIOLOGY | Facility: HOSPITAL | Age: 68
Discharge: HOME | End: 2023-06-19
Attending: UROLOGY
Payer: MEDICARE

## 2023-06-19 DIAGNOSIS — C61 PROSTATE CANCER: ICD-10-CM

## 2023-06-19 PROCEDURE — A9585 GADOBUTROL INJECTION: HCPCS | Performed by: UROLOGY

## 2023-06-19 PROCEDURE — A9595 NM PET CT F 18 PYL PSMA, MIDTHIGH TO VERTEX: HCPCS | Mod: TB

## 2023-06-19 PROCEDURE — 72197 MRI PELVIS W/O & W/DYE: CPT | Mod: 26,,, | Performed by: STUDENT IN AN ORGANIZED HEALTH CARE EDUCATION/TRAINING PROGRAM

## 2023-06-19 PROCEDURE — 72197 MRI PELVIS W/O & W/DYE: CPT | Mod: TC

## 2023-06-19 PROCEDURE — 72197 MRI PROSTATE W W/O CONTRAST: ICD-10-PCS | Mod: 26,,, | Performed by: STUDENT IN AN ORGANIZED HEALTH CARE EDUCATION/TRAINING PROGRAM

## 2023-06-19 PROCEDURE — 78815 PET IMAGE W/CT SKULL-THIGH: CPT | Mod: 26,PI,, | Performed by: STUDENT IN AN ORGANIZED HEALTH CARE EDUCATION/TRAINING PROGRAM

## 2023-06-19 PROCEDURE — 78815 PET IMAGE W/CT SKULL-THIGH: CPT | Mod: TC,PI

## 2023-06-19 PROCEDURE — 25500020 PHARM REV CODE 255: Performed by: UROLOGY

## 2023-06-19 PROCEDURE — 78815 NM PET CT F 18 PYL PSMA, MIDTHIGH TO VERTEX: ICD-10-PCS | Mod: 26,PI,, | Performed by: STUDENT IN AN ORGANIZED HEALTH CARE EDUCATION/TRAINING PROGRAM

## 2023-06-19 RX ORDER — GADOBUTROL 604.72 MG/ML
10 INJECTION INTRAVENOUS
Status: COMPLETED | OUTPATIENT
Start: 2023-06-19 | End: 2023-06-19

## 2023-06-19 RX ADMIN — GADOBUTROL 10 ML: 604.72 INJECTION INTRAVENOUS at 04:06

## 2023-06-20 ENCOUNTER — LAB VISIT (OUTPATIENT)
Dept: LAB | Facility: HOSPITAL | Age: 68
End: 2023-06-20
Attending: UROLOGY
Payer: MEDICARE

## 2023-06-20 DIAGNOSIS — C61 PROSTATE CANCER: ICD-10-CM

## 2023-06-20 LAB — COMPLEXED PSA SERPL-MCNC: 21 NG/ML (ref 0–4)

## 2023-06-20 PROCEDURE — 36415 COLL VENOUS BLD VENIPUNCTURE: CPT | Mod: PO | Performed by: UROLOGY

## 2023-06-20 PROCEDURE — 84153 ASSAY OF PSA TOTAL: CPT | Performed by: UROLOGY

## 2023-06-22 ENCOUNTER — OFFICE VISIT (OUTPATIENT)
Dept: UROLOGY | Facility: CLINIC | Age: 68
End: 2023-06-22
Payer: MEDICARE

## 2023-06-22 VITALS — WEIGHT: 168.44 LBS | BODY MASS INDEX: 25.61 KG/M2

## 2023-06-22 DIAGNOSIS — R35.1 NOCTURIA: ICD-10-CM

## 2023-06-22 DIAGNOSIS — N52.8 OTHER MALE ERECTILE DYSFUNCTION: ICD-10-CM

## 2023-06-22 DIAGNOSIS — C61 PROSTATE CANCER: Primary | ICD-10-CM

## 2023-06-22 DIAGNOSIS — E29.1 HYPOGONADISM IN MALE: ICD-10-CM

## 2023-06-22 PROCEDURE — 4010F ACE/ARB THERAPY RXD/TAKEN: CPT | Mod: CPTII,S$GLB,, | Performed by: UROLOGY

## 2023-06-22 PROCEDURE — 1160F RVW MEDS BY RX/DR IN RCRD: CPT | Mod: CPTII,S$GLB,, | Performed by: UROLOGY

## 2023-06-22 PROCEDURE — 3008F BODY MASS INDEX DOCD: CPT | Mod: CPTII,S$GLB,, | Performed by: UROLOGY

## 2023-06-22 PROCEDURE — 1125F PR PAIN SEVERITY QUANTIFIED, PAIN PRESENT: ICD-10-PCS | Mod: CPTII,S$GLB,, | Performed by: UROLOGY

## 2023-06-22 PROCEDURE — 4010F PR ACE/ARB THEARPY RXD/TAKEN: ICD-10-PCS | Mod: CPTII,S$GLB,, | Performed by: UROLOGY

## 2023-06-22 PROCEDURE — 99214 PR OFFICE/OUTPT VISIT, EST, LEVL IV, 30-39 MIN: ICD-10-PCS | Mod: S$GLB,,, | Performed by: UROLOGY

## 2023-06-22 PROCEDURE — 99214 OFFICE O/P EST MOD 30 MIN: CPT | Mod: S$GLB,,, | Performed by: UROLOGY

## 2023-06-22 PROCEDURE — 99999 PR PBB SHADOW E&M-EST. PATIENT-LVL IV: ICD-10-PCS | Mod: PBBFAC,,, | Performed by: UROLOGY

## 2023-06-22 PROCEDURE — 3008F PR BODY MASS INDEX (BMI) DOCUMENTED: ICD-10-PCS | Mod: CPTII,S$GLB,, | Performed by: UROLOGY

## 2023-06-22 PROCEDURE — 3044F PR MOST RECENT HEMOGLOBIN A1C LEVEL <7.0%: ICD-10-PCS | Mod: CPTII,S$GLB,, | Performed by: UROLOGY

## 2023-06-22 PROCEDURE — 1159F MED LIST DOCD IN RCRD: CPT | Mod: CPTII,S$GLB,, | Performed by: UROLOGY

## 2023-06-22 PROCEDURE — 3044F HG A1C LEVEL LT 7.0%: CPT | Mod: CPTII,S$GLB,, | Performed by: UROLOGY

## 2023-06-22 PROCEDURE — 1160F PR REVIEW ALL MEDS BY PRESCRIBER/CLIN PHARMACIST DOCUMENTED: ICD-10-PCS | Mod: CPTII,S$GLB,, | Performed by: UROLOGY

## 2023-06-22 PROCEDURE — 99999 PR PBB SHADOW E&M-EST. PATIENT-LVL IV: CPT | Mod: PBBFAC,,, | Performed by: UROLOGY

## 2023-06-22 PROCEDURE — 1159F PR MEDICATION LIST DOCUMENTED IN MEDICAL RECORD: ICD-10-PCS | Mod: CPTII,S$GLB,, | Performed by: UROLOGY

## 2023-06-22 PROCEDURE — 1125F AMNT PAIN NOTED PAIN PRSNT: CPT | Mod: CPTII,S$GLB,, | Performed by: UROLOGY

## 2023-06-22 NOTE — PROGRESS NOTES
Subjective:       Patient ID: Anthony Joseph Rockweiler is a 68 y.o. male The patient's last visit with me was on 5/12/2023.     Chief Complaint:   Chief Complaint   Patient presents with    Other     Follow up w/ pet scan and MRI       Prostate Cancer  He underwent a prostate needle biopsy on 5/2/2023.  His biopsy was indicated due to: Elevated PSA.  Afterwards he experienced: Gross Hematuria.  These symptoms have resolved.  His PSA prior to biopsy was 29.5.  PSA Density 0.45. His prostate size was 60 grams.  The ultrasound did not show a median lobe.  He currently does have erectile dysfunction.  His pathology showed: Prostate Cancer.     06/22/2023  He is back with a new PSA, MRI, and PSMA PET scan.    ACTIVE MEDICAL ISSUES:  Patient Active Problem List   Diagnosis    History of seizures    Headache    Nuclear sclerosis of both eyes    Moderate benzodiazepine use disorder       ALLERGIES AND MEDICATIONS: updated and reviewed.  Review of patient's allergies indicates:   Allergen Reactions    Paroxetine      Urinary problems     Current Outpatient Medications   Medication Sig    amLODIPine (NORVASC) 5 MG tablet Take 5 mg by mouth 2 (two) times daily.    carvedilol (COREG) 12.5 MG tablet Take 12.5 mg by mouth 2 (two) times daily with meals.    ibuprofen (ADVIL,MOTRIN) 800 MG tablet TAKE 1 TABLET BY MOUTH THREE TIMES DAILY AS NEEDED WITH FOOD    levothyroxine (SYNTHROID) 150 MCG tablet Take 1 tablet (150 mcg total) by mouth once daily.    LORazepam (ATIVAN) 1 MG tablet Take 1 tablet (1 mg total) by mouth 2 (two) times daily as needed for Anxiety.    losartan (COZAAR) 100 MG tablet Take 100 mg by mouth once daily.    ondansetron (ZOFRAN-ODT) 4 MG TbDL Take 1 tablet (4 mg total) by mouth every 12 (twelve) hours as needed (nausea).    pravastatin (PRAVACHOL) 20 MG tablet Take 20 mg by mouth once daily.    sildenafiL (VIAGRA) 100 MG tablet Take 1 tablet (100 mg total) by mouth daily as needed.    valproic acid (DEPAKENE)  250 mg capsule Take 1 capsule (250 mg total) by mouth 4 (four) times daily.    cyclobenzaprine (FLEXERIL) 10 MG tablet Take 10 mg by mouth every evening.    DULoxetine (CYMBALTA) 60 MG capsule Take 1 capsule (60 mg total) by mouth once daily.    meclizine (ANTIVERT) 25 mg tablet SMARTSI Tablet(s) By Mouth Every 12 Hours PRN     No current facility-administered medications for this visit.       Review of Systems   Constitutional:  Negative for chills and fever.   HENT:  Negative for congestion.    Respiratory:  Negative for chest tightness and shortness of breath.    Cardiovascular:  Negative for chest pain and palpitations.   Gastrointestinal:  Negative for abdominal pain, constipation, diarrhea, nausea and vomiting.   Genitourinary:  Negative for difficulty urinating, dysuria, flank pain, hematuria and urgency.   Musculoskeletal:  Negative for arthralgias.   Neurological:  Negative for dizziness.   Psychiatric/Behavioral:  Negative for confusion.      Objective:      Vitals:    23 0828   Weight: 76.4 kg (168 lb 6.9 oz)       Physical Exam  Vitals and nursing note reviewed.   Constitutional:       Appearance: He is well-developed.   HENT:      Head: Normocephalic.   Eyes:      Conjunctiva/sclera: Conjunctivae normal.   Neck:      Thyroid: No thyromegaly.      Trachea: No tracheal deviation.   Cardiovascular:      Rate and Rhythm: Normal rate.      Heart sounds: Normal heart sounds.   Pulmonary:      Effort: Pulmonary effort is normal. No respiratory distress.      Breath sounds: Normal breath sounds. No wheezing.   Abdominal:      General: Bowel sounds are normal.      Palpations: Abdomen is soft.      Tenderness: There is no abdominal tenderness. There is no rebound.      Hernia: No hernia is present.   Musculoskeletal:         General: No tenderness. Normal range of motion.      Cervical back: Normal range of motion and neck supple.   Lymphadenopathy:      Cervical: No cervical adenopathy.   Skin:      General: Skin is warm and dry.      Findings: No erythema or rash.   Neurological:      Mental Status: He is alert and oriented to person, place, and time.   Psychiatric:         Behavior: Behavior normal.         Thought Content: Thought content normal.         Judgment: Judgment normal.       Urine dipstick shows negative for all components.  Micro exam: negative for WBC's or RBC's.    Component Ref Range & Units 2 d ago   PSA Diagnostic 0.00 - 4.00 ng/mL 21.0 High     Comment: The testing method is a chemiluminescent microparticle immunoassay   manufactured by [employer] and performed on the [employer]   or   [employer] system. Values obtained with different assay manufacturers   for   methods may be different and cannot be used interchangeably.   PSA Expected levels:   Hormonal Therapy: <0.05 ng/ml   Prostatectomy: <0.01 ng/ml   Radiation Therapy: <1.00 ng/ml    Resulting Agency  [facility]              Specimen Collected: 06/20/23 08:20 Last Resulted: 06/20/23 13:42             MRI Prostate W W/O Contrast  Order: [id number]  Status: Final result     Visible to patient: Yes (seen)     Next appt: Today at 08:30 AM in Urology (José Luis Foley MD)     Dx: Prostate cancer     0 Result Notes  Details    Reading Physician Reading Date Result Priority   Andriy Mathur MD  [phone] 6/20/2023 Routine     Narrative & Impression  EXAMINATION:  MRI PROSTATE W W/O CONTRAST     CLINICAL HISTORY:  Prostate cancer, staging;  Malignant neoplasm of prostate     Additional history: None provided.     TECHNIQUE:  Multiparametric MRI of the prostate/pelvis performed on a 3T scanner with phase pelvic coil. Multiplanar, multisequence images including high resolution, small field-of-view T2-WI; axial diffusion weighted images with multiple B-values and creation of ADC-maps; and dynamic contrast enhanced T1-weighted images through the prostate were obtained before, during, and after the administration of 10 cc intravenous  gadolinium.     COMPARISON:  PET-CT 06/19/2023     FINDINGS:  Previous biopsy: [place] 6 right mid gland, 05/02/2023     PSA: 29.5 ng/mL 02/01/2023     Prior therapy: None     Prostate: 5.0 x 4.1 x 4.7 cm corresponding to a computed volume of 47 cc.     Peripheral zone: Focal lesion, as below.  Additional superimposed diffuse T2 signal hypointensity.     Lesion (MICHELLE) #P-1     Location: Side: Right; Region: mid; Zone: posterior peripheral zone laterally     Greatest dimension: 1.6 cm     T2-WI: Same as 4 but ?1.5 cm in greatest dimension or definite extraprostatic extension/invasive behavior, score 5.     DWI/ADC: Same as 4 but ?1.5 cm in greatest dimension or definite extraprostatic extension/invasive behavior, score 5.     DCE: Positive     Extraprostatic extension: Negative     PI-RADS assessment category: 5     Transitional zone: Focal lesion, as below.  Additional BPH nodules.     Lesion (MICHELLE) #T-1     Location: Side:Midline; Region: Base and mid; Zone: anterior     Greatest dimension: 1.9 cm     T2-WI: Heterogeneous signal intensity withobscured margins, score 3.     DWI/ADC: Focal (discrete and different from the background) hypointense on ADC and/or focal hyperintense on high b-value DWI; may be markedly hypointense on ADC or markedly hyperintense on high b-value DWI, but not both, score 3.     DCE: Positive     Extraprostatic extension: Negative     PI-RADS assessment category: 3     Neurovascular bundle: Normal appearance.     Seminal vesicles: Normal appearance.     Adjacent Organ Involvement: No evidence for urinary bladder or rectal invasion.     Lymphadenopathy: None.     Other Findings: Mild diffuse bladder wall thickening.  Colonic diverticulosis.  Sclerotic focus within the right femur which could represent a bone island, no corresponding radiotracer avidity identified on prior PSMA PET.     Impression:     1. PI-RADS 5 lesion within the right mid posterior peripheral zone laterally.  No evidence of  extracapsular extension.  2. PI-RADS 3 lesion within the midline base and mid anterior transition zone.  No evidence of extracapsular extension.  Overall Assessment: PI-RADS 5 - Very high (clinically significant cancer is highly likely to be present)     Number of targets created for potential MR/US fusion biopsy     Peripheral zone: 1     Transition zone: 1        Electronically signed by: Andriy Mathur  Date:                                            06/20/2023  Time:                                           06:59   NM PET CT F 18 PYL PSMA, Midthigh to Vertex  Order: [id number]  Status: Final result     Visible to patient: Yes (not seen)     Next appt: Today at 08:30 AM in Urology (José Luis Foley MD)     Dx: Prostate cancer     1 Result Note    1 Patient Communication  Details    Reading Physician Reading Date Result Priority   Aman Eckert IV, MD  [phone] 6/19/2023 Routine   Ramiro Sue MD  [phone]  [phone] 6/19/2023      Narrative & Impression  EXAMINATION:  NM PET CT F 18 PYL PSMA, MIDTHIGH TO VERTEX     CLINICAL HISTORY:  Malignant neoplasm of prostate     TECHNIQUE:  Approximately 60 minutes following the intravenous injection of 9.69 mCi F-18 piflufolastat, PET images were acquired from the proximal thighs to the skull vertex. CT images were also acquired for attenuation correction and anatomic localization and performed without oral or IV contrast.     COMPARISON:  Outside report CT head 04/04/2019     FINDINGS:  In the head and neck, there is physiologic uptake in the lacrimal and salivary glands, and there are no tracer avid lesions suspicious for malignancy.     In the chest, there are upper limit of normal sized bilateral axillary lymph nodes with low level tracer uptake, relatively symmetric and nonspecific.  No definite abnormal uptake suspicious for malignancy.     In the abdomen and pelvis, there is physiologic distribution in the liver, spleen, bowel, and  genitourinary tract, and there are no tracer avid lesions suspicious for malignancy.     There is focal increased radiotracer uptake within the central aspect of the prostate with SUV max of 4.9 (image 54), findings may be confounded by tracer in the prostatic urethra.     In the bones, there is physiologic uptake in the bone marrow, and there are no tracer avid lesions suspicious for malignancy.     Additional CT findings: Remote appearing inferior left frontal lobe encephalomalacia, recommend correlation with clinical history.  Thyroid is likely surgically absent.  Bibasilar dependent atelectasis.  Moderate to large hiatal hernia.  Prostate is mildly enlarged with dystrophic calcifications.  Diverticulosis coli.  Small fat containing umbilical hernia.  Mild aortoiliac atherosclerosis.     Impression:     In this patient with biopsy-proven prostate cancer, there are no focal suspicious tracer avid lesions to indicate metastatic disease.     Additional findings as above, including moderate to large sized hiatal hernia.     Electronically signed by resident: Ramiro Sue  Date:                                            06/19/2023  Time:                                           14:43     Electronically signed by: Aman Eckert  Date:                                            06/19/2023  Time:                                           16:53       Collected: 05/02/23 1119   Result status: Final   Resulting lab: [facility]   Value: 1. Prostate, left base lateral, biopsy:   Benign prostatic tissue     2. Prostate, left base medial, biopsy:   Benign prostatic tissue     3. Prostate, left mid lateral, biopsy:   Benign prostatic tissue     4. Prostate, left mid medial, biopsy:   Benign prostatic tissue     5. Prostate, left apex lateral, biopsy:   Atypical small acinar proliferation (ASAP)     6. Prostate, left apex medial, biopsy:   Benign prostatic tissue     7. Prostate, right base  lateral, biopsy:   Benign fibrous tissue     8. Prostate, right base medial, biopsy:   Benign prostatic tissue     9. Prostate, right mid lateral, biopsy:   Benign prostatic tissue     10. Prostate, right mid medial, biopsy:   Prostatic adenocarcinoma, Grade Group 1, Ralston score (3+3)=6   The tumor is present in 1/1 cores   The tumor measures 1 mm in length, approximately 11% of total prostatic tissue   No perineural invasion identified     11. Prostate, right apex lateral, biopsy:   Benign prostatic tissue     12. Prostate, right apex medial, biopsy:   Benign prostatic tissue    Comment: Interp By Jessica Granados D.O., Signed on 05/05/2023 at 13:34       Assessment:       1. Prostate cancer    2. Other male erectile dysfunction    3. Nocturia    4. Hypogonadism in male            Plan:       1. Prostate cancer  We talked about his high risk disease.    I recommend treatment.  He is very active and he wants to pursue treatment that will affect his lifestyle the least.    - Ambulatory referral/consult to Radiation Oncology; Future    2. Other male erectile dysfunction  Viagra when needed    3. Nocturia  stable    4. Hypogonadism in male  He needs to stay off of Testosterone for now.             Follow up in about 3 months (around 9/22/2023) for Follow up Established, Review PSA.

## 2023-07-03 ENCOUNTER — OFFICE VISIT (OUTPATIENT)
Dept: RADIATION ONCOLOGY | Facility: CLINIC | Age: 68
End: 2023-07-03
Attending: UROLOGY
Payer: MEDICARE

## 2023-07-03 VITALS
WEIGHT: 168 LBS | HEART RATE: 71 BPM | HEIGHT: 68 IN | RESPIRATION RATE: 16 BRPM | DIASTOLIC BLOOD PRESSURE: 85 MMHG | BODY MASS INDEX: 25.46 KG/M2 | SYSTOLIC BLOOD PRESSURE: 125 MMHG

## 2023-07-03 DIAGNOSIS — C61 PROSTATE CANCER: ICD-10-CM

## 2023-07-03 PROCEDURE — 99204 OFFICE O/P NEW MOD 45 MIN: CPT | Mod: HCNC,S$GLB,, | Performed by: RADIOLOGY

## 2023-07-03 PROCEDURE — 3288F PR FALLS RISK ASSESSMENT DOCUMENTED: ICD-10-PCS | Mod: HCNC,CPTII,S$GLB, | Performed by: RADIOLOGY

## 2023-07-03 PROCEDURE — 1126F PR PAIN SEVERITY QUANTIFIED, NO PAIN PRESENT: ICD-10-PCS | Mod: HCNC,CPTII,S$GLB, | Performed by: RADIOLOGY

## 2023-07-03 PROCEDURE — 3044F HG A1C LEVEL LT 7.0%: CPT | Mod: HCNC,CPTII,S$GLB, | Performed by: RADIOLOGY

## 2023-07-03 PROCEDURE — 3288F FALL RISK ASSESSMENT DOCD: CPT | Mod: HCNC,CPTII,S$GLB, | Performed by: RADIOLOGY

## 2023-07-03 PROCEDURE — 1101F PR PT FALLS ASSESS DOC 0-1 FALLS W/OUT INJ PAST YR: ICD-10-PCS | Mod: HCNC,CPTII,S$GLB, | Performed by: RADIOLOGY

## 2023-07-03 PROCEDURE — 3079F PR MOST RECENT DIASTOLIC BLOOD PRESSURE 80-89 MM HG: ICD-10-PCS | Mod: HCNC,CPTII,S$GLB, | Performed by: RADIOLOGY

## 2023-07-03 PROCEDURE — 1101F PT FALLS ASSESS-DOCD LE1/YR: CPT | Mod: HCNC,CPTII,S$GLB, | Performed by: RADIOLOGY

## 2023-07-03 PROCEDURE — 1159F PR MEDICATION LIST DOCUMENTED IN MEDICAL RECORD: ICD-10-PCS | Mod: HCNC,CPTII,S$GLB, | Performed by: RADIOLOGY

## 2023-07-03 PROCEDURE — 3008F PR BODY MASS INDEX (BMI) DOCUMENTED: ICD-10-PCS | Mod: HCNC,CPTII,S$GLB, | Performed by: RADIOLOGY

## 2023-07-03 PROCEDURE — 4010F ACE/ARB THERAPY RXD/TAKEN: CPT | Mod: HCNC,CPTII,S$GLB, | Performed by: RADIOLOGY

## 2023-07-03 PROCEDURE — 3008F BODY MASS INDEX DOCD: CPT | Mod: HCNC,CPTII,S$GLB, | Performed by: RADIOLOGY

## 2023-07-03 PROCEDURE — 1159F MED LIST DOCD IN RCRD: CPT | Mod: HCNC,CPTII,S$GLB, | Performed by: RADIOLOGY

## 2023-07-03 PROCEDURE — 1126F AMNT PAIN NOTED NONE PRSNT: CPT | Mod: HCNC,CPTII,S$GLB, | Performed by: RADIOLOGY

## 2023-07-03 PROCEDURE — 3074F PR MOST RECENT SYSTOLIC BLOOD PRESSURE < 130 MM HG: ICD-10-PCS | Mod: HCNC,CPTII,S$GLB, | Performed by: RADIOLOGY

## 2023-07-03 PROCEDURE — 3079F DIAST BP 80-89 MM HG: CPT | Mod: HCNC,CPTII,S$GLB, | Performed by: RADIOLOGY

## 2023-07-03 PROCEDURE — 99999 PR PBB SHADOW E&M-EST. PATIENT-LVL IV: ICD-10-PCS | Mod: PBBFAC,HCNC,, | Performed by: RADIOLOGY

## 2023-07-03 PROCEDURE — 1160F PR REVIEW ALL MEDS BY PRESCRIBER/CLIN PHARMACIST DOCUMENTED: ICD-10-PCS | Mod: HCNC,CPTII,S$GLB, | Performed by: RADIOLOGY

## 2023-07-03 PROCEDURE — 3074F SYST BP LT 130 MM HG: CPT | Mod: HCNC,CPTII,S$GLB, | Performed by: RADIOLOGY

## 2023-07-03 PROCEDURE — 99999 PR PBB SHADOW E&M-EST. PATIENT-LVL IV: CPT | Mod: PBBFAC,HCNC,, | Performed by: RADIOLOGY

## 2023-07-03 PROCEDURE — 1160F RVW MEDS BY RX/DR IN RCRD: CPT | Mod: HCNC,CPTII,S$GLB, | Performed by: RADIOLOGY

## 2023-07-03 PROCEDURE — 99204 PR OFFICE/OUTPT VISIT, NEW, LEVL IV, 45-59 MIN: ICD-10-PCS | Mod: HCNC,S$GLB,, | Performed by: RADIOLOGY

## 2023-07-03 PROCEDURE — 3044F PR MOST RECENT HEMOGLOBIN A1C LEVEL <7.0%: ICD-10-PCS | Mod: HCNC,CPTII,S$GLB, | Performed by: RADIOLOGY

## 2023-07-03 PROCEDURE — 4010F PR ACE/ARB THEARPY RXD/TAKEN: ICD-10-PCS | Mod: HCNC,CPTII,S$GLB, | Performed by: RADIOLOGY

## 2023-07-03 NOTE — PROGRESS NOTES
[facility] - Radiation Oncology     HISTORY OF PRESENT ILLNESS:   This patient presents for discussion of treatment options for a recently diagnosed prostate cancer.     Mr. Rockweiler was recently referred to Dr. Foley after screening PSA in February of 2023 returned at 29.5 ng/ml.  The patient has a history of hypogonadism and admitted to taking weekly testosterone cypionate for several years.  The patient received the testosterone from a friend.  ELIAN revealed some firmness at the base. Biopsies on 5/2/23 revealed [place] 6 (3+3) adenocarcinoma involving 11% of the core from the Rt. medial mid gland.  The remaining 11 cores revealed benign prostatic tissue. Further work up with Pylarify scan on 6/19/23 revealed focal increased radiotracer uptake in the central aspect of the prostate (max SUV 4.9) but no evidence of regional or distant metastatic disease.  MRI scan the same day revealed 47 cc  prostate with a 1.6 cm T2 hypointense lesion in the peripheral Rt. mid gland, PI-RADS 5 with no extraprostatic extension.  There was a 1.9 cm T2 hypointense lesion in the base mid transitional zone, PI-RADS 3 with no extraprostatic extension.  The seminal vesicles and neurovascular bundles were unremarkable.  There was no adenopathy. The patient presents for discussion of treatment options.  Of note, repeat PSA on 6/20/23 returned at 21 ng/ml    REVIEW OF SYSTEMS:   Review of Systems   Constitutional:  Negative for chills, fever, malaise/fatigue and weight loss.   Respiratory:  Negative for cough and shortness of breath.    Cardiovascular:  Negative for chest pain and palpitations.   Gastrointestinal:  Negative for abdominal pain, constipation and diarrhea.   Genitourinary:  Negative for dysuria, frequency, hematuria and urgency.        AUA score 0, 1, 1, 1, 1, 1, 1  = 6 Pleased.   Notes some ED Epic 26 completed.        PAST MEDICAL HISTORY:  Past Medical History:   Diagnosis Date    Headache     migraines    Hx of  psychiatric care     Hypertension     Prostate cancer     Psychiatric problem     Seizures     Therapy        PAST SURGICAL HISTORY:  Past Surgical History:   Procedure Laterality Date    PROSTATE BIOPSY  05/02/2023       ALLERGIES:   Review of patient's allergies indicates:   Allergen Reactions    Paroxetine      Urinary problems       MEDICATIONS:  Current Outpatient Medications   Medication Sig    amLODIPine (NORVASC) 5 MG tablet Take 5 mg by mouth 2 (two) times daily.    carvedilol (COREG) 12.5 MG tablet Take 12.5 mg by mouth 2 (two) times daily with meals.    ibuprofen (ADVIL,MOTRIN) 800 MG tablet TAKE 1 TABLET BY MOUTH THREE TIMES DAILY AS NEEDED WITH FOOD    levothyroxine (SYNTHROID) 150 MCG tablet Take 1 tablet (150 mcg total) by mouth once daily.    LORazepam (ATIVAN) 1 MG tablet Take 1 tablet (1 mg total) by mouth 2 (two) times daily as needed for Anxiety.    losartan (COZAAR) 100 MG tablet Take 100 mg by mouth once daily.    ondansetron (ZOFRAN-ODT) 4 MG TbDL Take 1 tablet (4 mg total) by mouth every 12 (twelve) hours as needed (nausea).    pravastatin (PRAVACHOL) 20 MG tablet Take 20 mg by mouth once daily.    sildenafiL (VIAGRA) 100 MG tablet Take 1 tablet (100 mg total) by mouth daily as needed.    valproic acid (DEPAKENE) 250 mg capsule Take 1 capsule (250 mg total) by mouth 4 (four) times daily.     No current facility-administered medications for this visit.       SOCIAL HISTORY:  Social History     Socioeconomic History    Marital status:     Number of children: 2   Tobacco Use    Smoking status: Never    Smokeless tobacco: Never   Substance and Sexual Activity    Alcohol use: No    Drug use: No    Sexual activity: Yes     Partners: Female     Birth control/protection: Condom       FAMILY HISTORY:  Family History   Problem Relation Age of Onset    No Known Problems Mother     No Known Problems Father     No Known Problems Sister     No Known Problems Brother     No Known Problems Maternal  Aunt     No Known Problems Maternal Uncle     No Known Problems Paternal Aunt     No Known Problems Paternal Uncle     No Known Problems Maternal Grandmother     No Known Problems Maternal Grandfather     No Known Problems Paternal Grandmother     No Known Problems Paternal Grandfather     Suicide Other     Dementia Neg Hx        PHYSICAL EXAMINATION:  Vitals:    23 1446   BP: 125/85   Pulse: 71   Resp: 16     Physical Exam  Constitutional:       General: He is not in acute distress.     Appearance: Normal appearance.   Pulmonary:      Effort: Pulmonary effort is normal. No respiratory distress.   Abdominal:      General: Abdomen is flat. There is no distension.   Neurological:      Mental Status: He is alert and oriented to person, place, and time.   Psychiatric:         Mood and Affect: Mood normal.         Judgment: Judgment normal.       ASSESSMENT/PLAN:  Clinically Stage IIIA (T2a, N0, M0, GG1, PSA > 20) prostate cancer     ECO    I had a very long discussion with the patient and his wife.  Reviewed his presentation.  Explained he is considered to have jenna risk prostate cancer based on his PSA although his pathology, MRI and PSMA scan appear more low risk.  Discussed the possibility of an increased PSA secondary to his testosterone replacement.  Of note his testosterone level in February was 92 ng/dl.  He stopped his PSA  in April.  We discussed a number of management options including active surveillance, RALP with bilateral node dissection  and external beam therapy using IMRT / IGRT techniques and delivering 70 Gy to the prostate along with possible hormonal replacement.  Explained the advantage of surgery would be to determine the true stage of his disease and following surgery, he may at some point, be eligible for testosterone replacement.  Discussed the procedures, risks and benefits of radiotherapy.  Explained the rational for combined modality therapy in high risk disease.  Interesting in  that his PSA decreased some 8 points since April.  The patient would like to proceed with active surveillance for now.  Explained this is reasonable given his MRI, PSMA and pathology and a decreased PSA.  Will plan repeat PSA in 6 weeks.  Follow up planned after results returned. thank you for allowing us to participate in the care of this patient.     Psychosocial Distress screening score of Distress Score: 1 noted and reviewed. No intervention indicated.     I spent approximately 45 minutes reviewing the available records and evaluating the patient, out of which over 50% of the time was spent face to face with the patient in counseling and coordinating this patient's care.

## 2023-07-11 ENCOUNTER — TELEPHONE (OUTPATIENT)
Dept: ADMINISTRATIVE | Facility: CLINIC | Age: 68
End: 2023-07-11
Payer: MEDICARE

## 2023-07-12 ENCOUNTER — OFFICE VISIT (OUTPATIENT)
Dept: FAMILY MEDICINE | Facility: CLINIC | Age: 68
End: 2023-07-12
Payer: MEDICARE

## 2023-07-12 VITALS
SYSTOLIC BLOOD PRESSURE: 130 MMHG | TEMPERATURE: 98 F | DIASTOLIC BLOOD PRESSURE: 90 MMHG | HEART RATE: 75 BPM | OXYGEN SATURATION: 97 % | WEIGHT: 167.44 LBS | BODY MASS INDEX: 25.46 KG/M2

## 2023-07-12 DIAGNOSIS — Z71.89 ADVANCED DIRECTIVES, COUNSELING/DISCUSSION: ICD-10-CM

## 2023-07-12 DIAGNOSIS — R51.9 NONINTRACTABLE EPISODIC HEADACHE, UNSPECIFIED HEADACHE TYPE: ICD-10-CM

## 2023-07-12 DIAGNOSIS — Z00.00 ENCOUNTER FOR MEDICARE ANNUAL WELLNESS EXAM: Primary | ICD-10-CM

## 2023-07-12 DIAGNOSIS — C61 PROSTATE CANCER: ICD-10-CM

## 2023-07-12 DIAGNOSIS — Z00.00 ENCOUNTER FOR PREVENTIVE HEALTH EXAMINATION: ICD-10-CM

## 2023-07-12 DIAGNOSIS — Z87.898 HISTORY OF SEIZURES: ICD-10-CM

## 2023-07-12 DIAGNOSIS — Z23 NEED FOR SHINGLES VACCINE: ICD-10-CM

## 2023-07-12 PROCEDURE — 99999 PR PBB SHADOW E&M-EST. PATIENT-LVL IV: CPT | Mod: PBBFAC,HCNC,, | Performed by: NURSE PRACTITIONER

## 2023-07-12 PROCEDURE — G0439 PPPS, SUBSEQ VISIT: HCPCS | Mod: HCNC,S$GLB,, | Performed by: NURSE PRACTITIONER

## 2023-07-12 PROCEDURE — 3008F BODY MASS INDEX DOCD: CPT | Mod: HCNC,CPTII,S$GLB, | Performed by: NURSE PRACTITIONER

## 2023-07-12 PROCEDURE — 3075F PR MOST RECENT SYSTOLIC BLOOD PRESS GE 130-139MM HG: ICD-10-PCS | Mod: HCNC,CPTII,S$GLB, | Performed by: NURSE PRACTITIONER

## 2023-07-12 PROCEDURE — 1170F PR FUNCTIONAL STATUS ASSESSED: ICD-10-PCS | Mod: HCNC,CPTII,S$GLB, | Performed by: NURSE PRACTITIONER

## 2023-07-12 PROCEDURE — 3288F PR FALLS RISK ASSESSMENT DOCUMENTED: ICD-10-PCS | Mod: HCNC,CPTII,S$GLB, | Performed by: NURSE PRACTITIONER

## 2023-07-12 PROCEDURE — 3044F HG A1C LEVEL LT 7.0%: CPT | Mod: HCNC,CPTII,S$GLB, | Performed by: NURSE PRACTITIONER

## 2023-07-12 PROCEDURE — 1170F FXNL STATUS ASSESSED: CPT | Mod: HCNC,CPTII,S$GLB, | Performed by: NURSE PRACTITIONER

## 2023-07-12 PROCEDURE — 1101F PT FALLS ASSESS-DOCD LE1/YR: CPT | Mod: HCNC,CPTII,S$GLB, | Performed by: NURSE PRACTITIONER

## 2023-07-12 PROCEDURE — 1125F AMNT PAIN NOTED PAIN PRSNT: CPT | Mod: HCNC,CPTII,S$GLB, | Performed by: NURSE PRACTITIONER

## 2023-07-12 PROCEDURE — 3075F SYST BP GE 130 - 139MM HG: CPT | Mod: HCNC,CPTII,S$GLB, | Performed by: NURSE PRACTITIONER

## 2023-07-12 PROCEDURE — 1125F PR PAIN SEVERITY QUANTIFIED, PAIN PRESENT: ICD-10-PCS | Mod: HCNC,CPTII,S$GLB, | Performed by: NURSE PRACTITIONER

## 2023-07-12 PROCEDURE — 4010F PR ACE/ARB THEARPY RXD/TAKEN: ICD-10-PCS | Mod: HCNC,CPTII,S$GLB, | Performed by: NURSE PRACTITIONER

## 2023-07-12 PROCEDURE — 1101F PR PT FALLS ASSESS DOC 0-1 FALLS W/OUT INJ PAST YR: ICD-10-PCS | Mod: HCNC,CPTII,S$GLB, | Performed by: NURSE PRACTITIONER

## 2023-07-12 PROCEDURE — 3080F DIAST BP >= 90 MM HG: CPT | Mod: HCNC,CPTII,S$GLB, | Performed by: NURSE PRACTITIONER

## 2023-07-12 PROCEDURE — G0439 PR MEDICARE ANNUAL WELLNESS SUBSEQUENT VISIT: ICD-10-PCS | Mod: HCNC,S$GLB,, | Performed by: NURSE PRACTITIONER

## 2023-07-12 PROCEDURE — 4010F ACE/ARB THERAPY RXD/TAKEN: CPT | Mod: HCNC,CPTII,S$GLB, | Performed by: NURSE PRACTITIONER

## 2023-07-12 PROCEDURE — 99999 PR PBB SHADOW E&M-EST. PATIENT-LVL IV: ICD-10-PCS | Mod: PBBFAC,HCNC,, | Performed by: NURSE PRACTITIONER

## 2023-07-12 PROCEDURE — 3044F PR MOST RECENT HEMOGLOBIN A1C LEVEL <7.0%: ICD-10-PCS | Mod: HCNC,CPTII,S$GLB, | Performed by: NURSE PRACTITIONER

## 2023-07-12 PROCEDURE — 3288F FALL RISK ASSESSMENT DOCD: CPT | Mod: HCNC,CPTII,S$GLB, | Performed by: NURSE PRACTITIONER

## 2023-07-12 PROCEDURE — 3008F PR BODY MASS INDEX (BMI) DOCUMENTED: ICD-10-PCS | Mod: HCNC,CPTII,S$GLB, | Performed by: NURSE PRACTITIONER

## 2023-07-12 PROCEDURE — 3080F PR MOST RECENT DIASTOLIC BLOOD PRESSURE >= 90 MM HG: ICD-10-PCS | Mod: HCNC,CPTII,S$GLB, | Performed by: NURSE PRACTITIONER

## 2023-07-12 NOTE — PROGRESS NOTES
HPI     Chief Complaint:  AWV    Anthony Joseph Rockweiler is a 68 y.o. male with multiple medical diagnoses as listed in the medical history and problem list that presented for a Medicare AWV and comprehensive Health Risk Assessment today.  Pt is new to me but is known to this clinic with his last appointment being 2/28/2023.      The following components were reviewed and updated:    Medical history  Family History  Social history  Allergies and Current Medications  Health Risk Assessment  Health Maintenance  Care Team           Assessment & Plan   (all problems are new to me)    Diagnoses and health risks identified today and associated recommendations/orders:    Problem List Items Addressed This Visit          Neuro    History of seizures    Reports seizure frequency has decreased. Followed by neurology. Continue valproic acid.     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  Patient was likely always in the antiepileptic medications to reduce the risk of recurrent seizures.  Discuss transitioning from valproic acid to topiramate to reduce the potential for drug drug interactions and undesired side effects including but not limited to weight gain.         Headache    Occurring on average 4 days per week. States headache frequency has increased since he d/c testosterone.         Overview     Posttraumatic headaches.  Currently helped with his current dose of valproic acid.  Discussed transitioning to topiramate            Oncology    Prostate cancer    Followed by urology and Radiation Oncology. Continue current treatment plan.      Other Visit Diagnoses       Encounter for preventive health examination      Counseled on age appropriate medical preventative services including age appropriate cancer screenings, age appropriate eye and dental exams, over all nutritional health, need for a consistent exercise regimen, and an over all push towards maintaining a vigorous and  active lifestyle.  Counseled on age appropriate vaccines and discussed upcoming health care needs based on age/gender. Discussed good sleep hygiene and stress management.      Advanced directives, counseling/discussion       I offered to discuss advanced care planning, including how to pick a person who would make decisions for you if you were unable to make them for yourself, called a health care power of , and what kind of decisions you might make such as use of life sustaining treatments such as ventilators and tube feeding when faced with a life limiting illness recorded on a living will that they will need to know. (How you want to be cared for as you near the end of your natural life)     X Patient is interested in learning more about how to make advanced directives.  I provided them paperwork and offered to discuss this with them.       Need for shingles vaccine        Relevant Medications    varicella-zoster gE-AS01B, PF, (SHINGRIX) 50 mcg/0.5 mL injection    Other Relevant Orders    Ambulatory referral/consult to the [facility]    Encounter for Medicare annual wellness exam  -  Primary       Counseled on age appropriate medical preventative services including age appropriate cancer screenings, age appropriate eye and dental exams, over all nutritional health, need for a consistent exercise regimen, and an over all push towards maintaining a vigorous and active lifestyle.  Counseled on age appropriate vaccines and discussed upcoming health care needs based on age/gender. Discussed good sleep hygiene and stress management.                --------------------------------------------    ** See Completed Assessments for Annual Wellness Visit within the encounter summary.**    The following assessments were completed:  Living Situation  CAGE  Depression Screening  Timed Get Up and Go  Whisper Test  Cognitive Function Screening  Nutrition Screening  ADL Screening  PAQ  Screening      Provided Anthony Joseph Rockweiler  with a 5-10 year written screening schedule and personal prevention plan. Recommendations were developed using the USPSTF age appropriate recommendations. Education, counseling, and referrals were provided as needed. After Visit Summary printed and given to patient which includes a list of additional screenings\tests needed.    Review for Opioid Screening: Pt does not have Rx for Opioids     Review for Substance Use Disorders: Patient does not abuse use substances    Exam     Review of Systems:  (as noted above)  Review of Systems   Constitutional:  Negative for fever.   HENT:  Negative for trouble swallowing.    Eyes:  Negative for visual disturbance.   Respiratory:  Negative for chest tightness and shortness of breath.    Cardiovascular:  Negative for chest pain.   Gastrointestinal:  Negative for blood in stool.   Neurological:  Positive for headaches.     Physical Exam:   Physical Exam  Constitutional:       General: He is not in acute distress.     Appearance: He is not ill-appearing or diaphoretic.   HENT:      Head: Normocephalic and atraumatic.   Eyes:      General: No scleral icterus.  Cardiovascular:      Rate and Rhythm: Normal rate and regular rhythm.      Pulses: Normal pulses.      Heart sounds: No murmur heard.    No friction rub. No gallop.   Pulmonary:      Effort: No respiratory distress.   Chest:      Chest wall: No tenderness.   Musculoskeletal:      Cervical back: No rigidity.   Skin:     Capillary Refill: Capillary refill takes 2 to 3 seconds.   Neurological:      General: No focal deficit present.      Mental Status: He is alert and oriented to person, place, and time.     Vitals:    07/12/23 1456   BP: (!) 130/90   BP Location: Left arm   Patient Position: Sitting   BP Method: Medium (Manual)   Pulse: 75   Temp: 98.1 °F (36.7 °C)   TempSrc: Oral   SpO2: 97%   Weight: 76 kg (167 lb 7 oz)      Body mass index is 25.46  kg/m².    Clock:                Health Maintenance:  Health Maintenance         Date Due Completion Date    Pneumococcal Vaccines (Age 65+) (1 - PCV) Never done ---    Shingles Vaccine (1 of 2) Never done ---    TETANUS VACCINE 09/09/2015 9/9/2005    COVID-19 Vaccine (4 - Pfizer series) 03/25/2022 1/28/2022    Influenza Vaccine (1) 09/01/2023 ---    Hemoglobin A1c (Prediabetes) 02/01/2024 2/1/2023    Colorectal Cancer Screening 10/02/2027 10/2/2017    Lipid Panel 02/01/2028 2/1/2023            Shingles vaccine ordered      Follow Up:  Follow up in about 3 months (around 10/12/2023), or if symptoms worsen or fail to improve.    History     Past Medical History:  Past Medical History:   Diagnosis Date    Headache     migraines    Hx of psychiatric care     Hypertension     Prostate cancer     Psychiatric problem     Seizures     Therapy        Past Surgical History:  Past Surgical History:   Procedure Laterality Date    PROSTATE BIOPSY  05/02/2023       Social History:  Social History     Socioeconomic History    Marital status:     Number of children: 2   Tobacco Use    Smoking status: Never    Smokeless tobacco: Never   Substance and Sexual Activity    Alcohol use: No    Drug use: No    Sexual activity: Yes     Partners: Female     Birth control/protection: Condom     Social Determinants of Health     Financial Resource Strain: Low Risk     Difficulty of Paying Living Expenses: Not hard at all   Food Insecurity: No Food Insecurity    Worried About Running Out of Food in the Last Year: Never true    Ran Out of Food in the Last Year: Never true   Transportation Needs: No Transportation Needs    Lack of Transportation (Medical): No    Lack of Transportation (Non-Medical): No   Physical Activity: Sufficiently Active    Days of Exercise per Week: 6 days    Minutes of Exercise per Session: 50 min   Stress: Stress Concern Present    Feeling of Stress : Very much   Social Connections: Socially Integrated    Frequency  of Communication with Friends and Family: More than three times a week    Frequency of Social Gatherings with Friends and Family: More than three times a week    Attends Worship Services: More than 4 times per year    Active Member of Clubs or Organizations: Yes    Attends Club or Organization Meetings: More than 4 times per year    Marital Status:    Housing Stability: Low Risk     Unable to Pay for Housing in the Last Year: No    Number of Places Lived in the Last Year: 1    Unstable Housing in the Last Year: No       Family History:  Family History   Problem Relation Age of Onset    No Known Problems Mother     No Known Problems Father     No Known Problems Sister     No Known Problems Brother     No Known Problems Maternal Aunt     No Known Problems Maternal Uncle     No Known Problems Paternal Aunt     No Known Problems Paternal Uncle     No Known Problems Maternal Grandmother     No Known Problems Maternal Grandfather     No Known Problems Paternal Grandmother     No Known Problems Paternal Grandfather     Suicide Other     Dementia Neg Hx        Allergies and Medications: (updated and reviewed)  Review of patient's allergies indicates:   Allergen Reactions    Paroxetine      Urinary problems     Current Outpatient Medications   Medication Sig Dispense Refill    amLODIPine (NORVASC) 5 MG tablet Take 5 mg by mouth 2 (two) times daily.  3    carvedilol (COREG) 12.5 MG tablet Take 12.5 mg by mouth 2 (two) times daily with meals.      ibuprofen (ADVIL,MOTRIN) 800 MG tablet TAKE 1 TABLET BY MOUTH THREE TIMES DAILY AS NEEDED WITH FOOD 90 tablet 11    levothyroxine (SYNTHROID) 150 MCG tablet Take 1 tablet (150 mcg total) by mouth once daily. 30 tablet 11    LORazepam (ATIVAN) 1 MG tablet Take 1 tablet (1 mg total) by mouth 2 (two) times daily as needed for Anxiety. 60 tablet 2    losartan (COZAAR) 100 MG tablet Take 100 mg by mouth once daily.      ondansetron (ZOFRAN-ODT) 4 MG TbDL Take 1 tablet (4 mg  total) by mouth every 12 (twelve) hours as needed (nausea). 30 tablet 1    pravastatin (PRAVACHOL) 20 MG tablet Take 20 mg by mouth once daily.      sildenafiL (VIAGRA) 100 MG tablet Take 1 tablet (100 mg total) by mouth daily as needed. 30 tablet 11    valproic acid (DEPAKENE) 250 mg capsule Take 1 capsule (250 mg total) by mouth 4 (four) times daily. 120 capsule 11    varicella-zoster gE-AS01B, PF, (SHINGRIX) 50 mcg/0.5 mL injection Inject 0.5 mLs into the muscle once. for 1 dose 1 each 0     No current facility-administered medications for this visit.           - The patient is given an After Visit Summary that lists all medications with directions, allergies, education, orders placed during this encounter and follow-up instructions.      - I have reviewed the patient's medical information including past medical, family, and social history sections including the medications and allergies.      - We discussed the patient's current medications.     This note was created by combination of typed  and MModal dictation.  Transcription errors may be present.  If there are any questions, please contact me.       Se Jimenez NP

## 2023-07-12 NOTE — PATIENT INSTRUCTIONS
Counseling and Referral of Other Preventative  (Italic type indicates deductible and co-insurance are waived)    Patient Name: Anthony Rockweiler  Today's Date: 7/12/2023    Health Maintenance         Date Due Completion Date    Pneumococcal Vaccines (Age 65+) (1 - PCV) Never done ---    Shingles Vaccine (1 of 2) Never done ---    TETANUS VACCINE 09/09/2015 9/9/2005    COVID-19 Vaccine (4 - Pfizer series) 03/25/2022 1/28/2022    Influenza Vaccine (1) 09/01/2023 ---    Hemoglobin A1c (Prediabetes) 02/01/2024 2/1/2023    Colorectal Cancer Screening 10/02/2027 10/2/2017    Lipid Panel 02/01/2028 2/1/2023          No orders of the defined types were placed in this encounter.      The following information is provided to all patients.  This information is to help you find resources for any of the problems found today that may be affecting your health:                Living healthy guide: www.[facility].[place].[facility]      Understanding Diabetes: www.diabetes.org      Eating healthy: www.cdc.gov/healthyweight      [facility] home safety checklist: www.cdc.gov/steadi/patient.html      Agency on Aging: www.goea.[place].[facility]      Alcoholics anonymous (AA): www.aa.org      Physical Activity: www.vivi.nih.gov/el3ngxi      Tobacco use: www.quitwithusla.org         Patient Education        Yearly Physical for Adults   About this topic   Most people do not want to be sick. Having a checkup each year with your doctor is one way to help you stay healthy. You may need to see your doctor more or less often. How often you need to go to the doctor depends on your age. Your family and medical history also play a role in how often you need to go to the doctor. Going to see your doctor on a routine basis can help you find problems early or even before they start. This may make it easier to treat or cure your problem.  General   Your doctor will talk about many things during your checkup. Your doctor may ask about:  Your medical and family history.  All  the drugs you are taking. Be sure to include all prescription, over the counter, and herbal supplements. Tell the doctor if you have any drug allergy. Bring a list of drugs you take with you.  How you are feeling and if you are having any problems.  Risky behaviors like smoking, drinking alcohol, using illegal drugs, not wearing seatbelts, having unprotected sex, etc.  Your doctor will do a physical exam and may check your:  Height and weight  Blood pressure  Reflexes  Memory  Vision  Hearing  Your doctor may order:  Lab tests  ECG to check your heart rhythm  X-rays  Tests or treatments based on your exam  What lifestyle changes are needed?   Your doctor may suggest you make changes to your lifestyle at this visit. The doctor may talk with you about being more active or lowering stress levels. Ask your doctor what you need to do.  What drugs may be needed?   Your doctor may order drugs or vaccines to protect you from illnesses.  What changes to diet are needed?   Talk to your doctor to see if any changes are needed to your diet.  When do I need to call the doctor?   Call your doctor if you need to learn about any test results. Together you can make a plan for more care.  Helpful tips   Make a list of questions for your doctor before you go. This will help you remember to ask about any concerns. Write down any answers from your doctor so you can look over them after your visit.   Tell your doctor about any changes in your body or health since your last visit.  Ask your doctor about any screening tests you need.  Where can I learn more?   American Academy of [employer]icians  http://familydoctor.org/familydoctor/en/prevention-wellness/staying-healthy/healthy-living/preventive-services-for-healthy-living.printerview.html   Centers for Disease Control  http://www.cdc.gov/family/checkup/   Last Reviewed Date   2019-04-22  Consumer Information Use and Disclaimer   This information is not specific medical advice and does  not replace information you receive from your health care provider. This is only a brief summary of general information. It does NOT include all information about conditions, illnesses, injuries, tests, procedures, treatments, therapies, discharge instructions or life-style choices that may apply to you. You must talk with your health care provider for complete information about your health and treatment options. This information should not be used to decide whether or not to accept your health care providers advice, instructions or recommendations. Only your health care provider has the knowledge and training to provide advice that is right for you.  Copyright   Copyright © 2021 [employer] and its affiliates and/or licensors. All rights reserved.

## 2023-07-21 DIAGNOSIS — E03.9 HYPOTHYROIDISM, UNSPECIFIED TYPE: ICD-10-CM

## 2023-07-21 RX ORDER — LEVOTHYROXINE SODIUM 150 UG/1
TABLET ORAL
Refills: 0 | OUTPATIENT
Start: 2023-07-21

## 2023-07-21 NOTE — TELEPHONE ENCOUNTER
Refill Decision Note   Anthony Rockweiler  is requesting a refill authorization.  Brief Assessment and Rationale for Refill:  Quick Discontinue     Medication Therapy Plan:  No sig   Pharmacy is requesting new scripts for the following medications without required information, (sig/ frequency/qty/etc)      Medication Reconciliation Completed: No     Comments: Pharmacies have been requesting medications for patients without required information, (sig, frequency, qty, etc.). In addition, requests are sent for medication(s) pt. are currently not taking, and medications patients have never taken.    We have spoken to the pharmacies about these request types and advised their teams previously that we are unable to assess these New Script requests and require all details for these requests. This is a known issue and has been reported.     Note composed:3:52 PM 07/21/2023

## 2023-08-04 ENCOUNTER — LAB VISIT (OUTPATIENT)
Dept: LAB | Facility: HOSPITAL | Age: 68
End: 2023-08-04
Attending: RADIOLOGY
Payer: MEDICARE

## 2023-08-04 DIAGNOSIS — C61 PROSTATE CANCER: ICD-10-CM

## 2023-08-04 LAB — COMPLEXED PSA SERPL-MCNC: 19.8 NG/ML (ref 0–4)

## 2023-08-04 PROCEDURE — 36415 COLL VENOUS BLD VENIPUNCTURE: CPT | Mod: HCNC,PO | Performed by: RADIOLOGY

## 2023-08-04 PROCEDURE — 84153 ASSAY OF PSA TOTAL: CPT | Mod: HCNC | Performed by: RADIOLOGY

## 2023-08-07 ENCOUNTER — PATIENT OUTREACH (OUTPATIENT)
Dept: ADMINISTRATIVE | Facility: HOSPITAL | Age: 68
End: 2023-08-07
Payer: MEDICARE

## 2023-08-07 ENCOUNTER — OFFICE VISIT (OUTPATIENT)
Dept: FAMILY MEDICINE | Facility: CLINIC | Age: 68
End: 2023-08-07
Payer: MEDICARE

## 2023-08-07 VITALS
TEMPERATURE: 98 F | BODY MASS INDEX: 25.27 KG/M2 | WEIGHT: 166.75 LBS | HEART RATE: 75 BPM | DIASTOLIC BLOOD PRESSURE: 80 MMHG | SYSTOLIC BLOOD PRESSURE: 106 MMHG | HEIGHT: 68 IN | OXYGEN SATURATION: 97 %

## 2023-08-07 DIAGNOSIS — R73.03 PRE-DIABETES: ICD-10-CM

## 2023-08-07 DIAGNOSIS — F41.1 GAD (GENERALIZED ANXIETY DISORDER): ICD-10-CM

## 2023-08-07 DIAGNOSIS — I10 ESSENTIAL HYPERTENSION: Primary | ICD-10-CM

## 2023-08-07 DIAGNOSIS — E78.5 HYPERLIPIDEMIA, UNSPECIFIED HYPERLIPIDEMIA TYPE: ICD-10-CM

## 2023-08-07 DIAGNOSIS — C61 PROSTATE CANCER: ICD-10-CM

## 2023-08-07 DIAGNOSIS — E03.9 HYPOTHYROIDISM, UNSPECIFIED TYPE: ICD-10-CM

## 2023-08-07 DIAGNOSIS — D75.1 POLYCYTHEMIA: ICD-10-CM

## 2023-08-07 DIAGNOSIS — E29.1 HYPOGONADISM IN MALE: ICD-10-CM

## 2023-08-07 PROCEDURE — 99999 PR PBB SHADOW E&M-EST. PATIENT-LVL IV: CPT | Mod: PBBFAC,HCNC,, | Performed by: FAMILY MEDICINE

## 2023-08-07 PROCEDURE — 3079F PR MOST RECENT DIASTOLIC BLOOD PRESSURE 80-89 MM HG: ICD-10-PCS | Mod: HCNC,CPTII,S$GLB, | Performed by: FAMILY MEDICINE

## 2023-08-07 PROCEDURE — 3074F PR MOST RECENT SYSTOLIC BLOOD PRESSURE < 130 MM HG: ICD-10-PCS | Mod: HCNC,CPTII,S$GLB, | Performed by: FAMILY MEDICINE

## 2023-08-07 PROCEDURE — 1101F PR PT FALLS ASSESS DOC 0-1 FALLS W/OUT INJ PAST YR: ICD-10-PCS | Mod: HCNC,CPTII,S$GLB, | Performed by: FAMILY MEDICINE

## 2023-08-07 PROCEDURE — 1159F MED LIST DOCD IN RCRD: CPT | Mod: HCNC,CPTII,S$GLB, | Performed by: FAMILY MEDICINE

## 2023-08-07 PROCEDURE — 99214 OFFICE O/P EST MOD 30 MIN: CPT | Mod: HCNC,S$GLB,, | Performed by: FAMILY MEDICINE

## 2023-08-07 PROCEDURE — 1160F PR REVIEW ALL MEDS BY PRESCRIBER/CLIN PHARMACIST DOCUMENTED: ICD-10-PCS | Mod: HCNC,CPTII,S$GLB, | Performed by: FAMILY MEDICINE

## 2023-08-07 PROCEDURE — 3079F DIAST BP 80-89 MM HG: CPT | Mod: HCNC,CPTII,S$GLB, | Performed by: FAMILY MEDICINE

## 2023-08-07 PROCEDURE — 3008F BODY MASS INDEX DOCD: CPT | Mod: HCNC,CPTII,S$GLB, | Performed by: FAMILY MEDICINE

## 2023-08-07 PROCEDURE — 1126F PR PAIN SEVERITY QUANTIFIED, NO PAIN PRESENT: ICD-10-PCS | Mod: HCNC,CPTII,S$GLB, | Performed by: FAMILY MEDICINE

## 2023-08-07 PROCEDURE — 1160F RVW MEDS BY RX/DR IN RCRD: CPT | Mod: HCNC,CPTII,S$GLB, | Performed by: FAMILY MEDICINE

## 2023-08-07 PROCEDURE — 3288F PR FALLS RISK ASSESSMENT DOCUMENTED: ICD-10-PCS | Mod: HCNC,CPTII,S$GLB, | Performed by: FAMILY MEDICINE

## 2023-08-07 PROCEDURE — 1126F AMNT PAIN NOTED NONE PRSNT: CPT | Mod: HCNC,CPTII,S$GLB, | Performed by: FAMILY MEDICINE

## 2023-08-07 PROCEDURE — 4010F PR ACE/ARB THEARPY RXD/TAKEN: ICD-10-PCS | Mod: HCNC,CPTII,S$GLB, | Performed by: FAMILY MEDICINE

## 2023-08-07 PROCEDURE — 3044F HG A1C LEVEL LT 7.0%: CPT | Mod: HCNC,CPTII,S$GLB, | Performed by: FAMILY MEDICINE

## 2023-08-07 PROCEDURE — 3008F PR BODY MASS INDEX (BMI) DOCUMENTED: ICD-10-PCS | Mod: HCNC,CPTII,S$GLB, | Performed by: FAMILY MEDICINE

## 2023-08-07 PROCEDURE — 1159F PR MEDICATION LIST DOCUMENTED IN MEDICAL RECORD: ICD-10-PCS | Mod: HCNC,CPTII,S$GLB, | Performed by: FAMILY MEDICINE

## 2023-08-07 PROCEDURE — 99214 PR OFFICE/OUTPT VISIT, EST, LEVL IV, 30-39 MIN: ICD-10-PCS | Mod: HCNC,S$GLB,, | Performed by: FAMILY MEDICINE

## 2023-08-07 PROCEDURE — 3288F FALL RISK ASSESSMENT DOCD: CPT | Mod: HCNC,CPTII,S$GLB, | Performed by: FAMILY MEDICINE

## 2023-08-07 PROCEDURE — 3074F SYST BP LT 130 MM HG: CPT | Mod: HCNC,CPTII,S$GLB, | Performed by: FAMILY MEDICINE

## 2023-08-07 PROCEDURE — 3044F PR MOST RECENT HEMOGLOBIN A1C LEVEL <7.0%: ICD-10-PCS | Mod: HCNC,CPTII,S$GLB, | Performed by: FAMILY MEDICINE

## 2023-08-07 PROCEDURE — 4010F ACE/ARB THERAPY RXD/TAKEN: CPT | Mod: HCNC,CPTII,S$GLB, | Performed by: FAMILY MEDICINE

## 2023-08-07 PROCEDURE — 1101F PT FALLS ASSESS-DOCD LE1/YR: CPT | Mod: HCNC,CPTII,S$GLB, | Performed by: FAMILY MEDICINE

## 2023-08-07 PROCEDURE — 99999 PR PBB SHADOW E&M-EST. PATIENT-LVL IV: ICD-10-PCS | Mod: PBBFAC,HCNC,, | Performed by: FAMILY MEDICINE

## 2023-08-07 RX ORDER — TRAMADOL HYDROCHLORIDE 50 MG/1
50 TABLET ORAL EVERY 6 HOURS PRN
COMMUNITY
Start: 2023-08-03 | End: 2024-02-26

## 2023-08-07 RX ORDER — AZITHROMYCIN 250 MG/1
TABLET, FILM COATED ORAL
COMMUNITY
Start: 2023-08-03 | End: 2023-08-21

## 2023-08-07 RX ORDER — MELOXICAM 15 MG/1
15 TABLET ORAL
COMMUNITY
Start: 2023-08-03 | End: 2023-12-18

## 2023-08-07 RX ORDER — LEVOTHYROXINE SODIUM 150 UG/1
150 TABLET ORAL DAILY
Qty: 30 TABLET | Refills: 11 | Status: SHIPPED | OUTPATIENT
Start: 2023-08-07 | End: 2023-08-07

## 2023-08-07 RX ORDER — LEVOTHYROXINE SODIUM 150 UG/1
150 TABLET ORAL DAILY
Qty: 30 TABLET | Refills: 11 | Status: SHIPPED | OUTPATIENT
Start: 2023-08-07 | End: 2024-08-06

## 2023-08-07 RX ORDER — CYCLOBENZAPRINE HCL 10 MG
10 TABLET ORAL EVERY 8 HOURS PRN
COMMUNITY
Start: 2023-08-03 | End: 2023-12-18

## 2023-08-07 NOTE — PROGRESS NOTES
Assessment & Plan:    Essential hypertension  Controlled. Continue current therapy.     Pre-diabetes  Hyperlipidemia, unspecified hyperlipidemia type  Polycythemia    Hypothyroidism, unspecified type  -     levothyroxine (SYNTHROID) 150 MCG tablet; Take 1 tablet (150 mcg total) by mouth once daily.  Dispense: 30 tablet; Refill: 11    Labs ordered in Feb to be scheduled this week.    Prostate cancer  Hypogonadism in male  Following with Urology and Rad Onc. Patient refraining for testosterone use.     RONDA (generalized anxiety disorder)  Controlled. Continue current therapy.     Declines all vaccines.     Follow-up: Follow up in about 6 months (around 2/7/2024).  ______________________________________________________________________    Chief Complaint  Chief Complaint   Patient presents with    Hyperlipidemia    Pre-diabetes       HPI  Anthony Joseph Rockweiler is a 68 y.o. male with medical diagnoses as listed in the medical history and problem list that presents to the office to follow up on his chronic conditions. Unfortunately, he did not get his labs done prior to this visit as instructed. He has been exercising and lost 9 lbs since his last visit in January. He has been following up with Urology and Radiation Oncology for prostate cancer. He is still considering his treatment options and may get a second opinion in [place]. Requesting Synthroid to be refilled to [facility].     Most recent pertinent workup:     Last CBC Results:   Lab Results   Component Value Date    WBC 7.21 02/01/2023    HGB 18.2 (H) 02/01/2023    HCT 55.3 (H) 02/01/2023     02/01/2023       Last CMP Results  Lab Results   Component Value Date     02/01/2023    K 4.4 02/01/2023     02/01/2023    CO2 24 02/01/2023    BUN 16 02/01/2023    CREATININE 1.0 02/01/2023    CALCIUM 10.2 02/01/2023    ALBUMIN 4.1 02/01/2023    ALBUMIN 4.5 02/01/2023    AST 30 02/01/2023    ALT 47 (H) 02/01/2023       Last Lipids  Lab Results    Component Value Date    CHOL 175 02/01/2023    TRIG 201 (H) 02/01/2023    HDL 39 (L) 02/01/2023    LDLCALC 95.8 02/01/2023   The 10-year ASCVD risk score (Jasbir PIMENTEL, et al., 2019) is: 12.2%    Values used to calculate the score:      Age: 68 years      Sex: Male      Is Non- : No      Diabetic: No      Tobacco smoker: No      Systolic Blood Pressure: 106 mmHg      Is BP treated: Yes      HDL Cholesterol: 39 mg/dL      Total Cholesterol: 175 mg/dL      Last A1C  Lab Results   Component Value Date    HGBA1C 5.8 (H) 02/01/2023         Health Maintenance         Date Due Completion Date    Pneumococcal Vaccines (Age 65+) (1 - PCV) Never done ---    Shingles Vaccine (1 of 2) Never done ---    TETANUS VACCINE 09/09/2015 9/9/2005    COVID-19 Vaccine (4 - Pfizer series) 03/25/2022 1/28/2022    Influenza Vaccine (1) 09/01/2023 ---    Hemoglobin A1c (Prediabetes) 02/01/2024 2/1/2023    Colorectal Cancer Screening 10/02/2027 10/2/2017    Lipid Panel 02/01/2028 2/1/2023              PAST MEDICAL HISTORY:  Past Medical History:   Diagnosis Date    Headache     migraines    Hx of psychiatric care     Hypertension     Prostate cancer     Psychiatric problem     Seizures     Therapy        PAST SURGICAL HISTORY:  Past Surgical History:   Procedure Laterality Date    PROSTATE BIOPSY  05/02/2023       SOCIAL HISTORY:  Social History     Socioeconomic History    Marital status:     Number of children: 2   Tobacco Use    Smoking status: Never    Smokeless tobacco: Never   Substance and Sexual Activity    Alcohol use: No    Drug use: No    Sexual activity: Yes     Partners: Female     Birth control/protection: Condom     Social Determinants of Health     Financial Resource Strain: Low Risk  (7/12/2023)    Overall Financial Resource Strain (CARDIA)     Difficulty of Paying Living Expenses: Not hard at all   Food Insecurity: No Food Insecurity (7/12/2023)    Hunger Vital Sign     Worried About Running Out  of Food in the Last Year: Never true     Ran Out of Food in the Last Year: Never true   Transportation Needs: No Transportation Needs (7/12/2023)    PRAPARE - Transportation     Lack of Transportation (Medical): No     Lack of Transportation (Non-Medical): No   Physical Activity: Sufficiently Active (7/12/2023)    Exercise Vital Sign     Days of Exercise per Week: 6 days     Minutes of Exercise per Session: 50 min   Stress: Stress Concern Present (7/12/2023)    [employer] - Occupational Stress Questionnaire     Feeling of Stress : Very much   Social Connections: Socially Integrated (7/12/2023)    Social Connection and Isolation Panel [NHANES]     Frequency of Communication with Friends and Family: More than three times a week     Frequency of Social Gatherings with Friends and Family: More than three times a week     Attends Gnosticist Services: More than 4 times per year     Active Member of Clubs or Organizations: Yes     Attends Club or Organization Meetings: More than 4 times per year     Marital Status:    Housing Stability: Low Risk  (7/12/2023)    Housing Stability Vital Sign     Unable to Pay for Housing in the Last Year: No     Number of Places Lived in the Last Year: 1     Unstable Housing in the Last Year: No       FAMILY HISTORY:  Family History   Problem Relation Age of Onset    No Known Problems Mother     No Known Problems Father     No Known Problems Sister     No Known Problems Brother     No Known Problems Maternal Aunt     No Known Problems Maternal Uncle     No Known Problems Paternal Aunt     No Known Problems Paternal Uncle     No Known Problems Maternal Grandmother     No Known Problems Maternal Grandfather     No Known Problems Paternal Grandmother     No Known Problems Paternal Grandfather     Suicide Other     Dementia Neg Hx        ALLERGIES AND MEDICATIONS: updated and reviewed.  Review of patient's allergies indicates:   Allergen Reactions     Paroxetine      Urinary problems     Current Outpatient Medications   Medication Sig Dispense Refill    amLODIPine (NORVASC) 5 MG tablet Take 5 mg by mouth 2 (two) times daily.  3    azithromycin (Z-KYLIE) 250 MG tablet TAKE 2 TABLETS BY MOUTH ON DAY 1, AND THEN TAKE 1 TABLET BY MOUTH ONCE A DAY ON DAY 2 THROUGH DAY 5      carvedilol (COREG) 12.5 MG tablet Take 12.5 mg by mouth 2 (two) times daily with meals.      cyclobenzaprine (FLEXERIL) 10 MG tablet Take 10 mg by mouth every 8 (eight) hours as needed.      ibuprofen (ADVIL,MOTRIN) 800 MG tablet TAKE 1 TABLET BY MOUTH THREE TIMES DAILY AS NEEDED WITH FOOD 90 tablet 11    LORazepam (ATIVAN) 1 MG tablet Take 1 tablet (1 mg total) by mouth 2 (two) times daily as needed for Anxiety. 60 tablet 2    losartan (COZAAR) 100 MG tablet Take 100 mg by mouth once daily.      meloxicam (MOBIC) 15 MG tablet Take 15 mg by mouth.      ondansetron (ZOFRAN-ODT) 4 MG TbDL Take 1 tablet (4 mg total) by mouth every 12 (twelve) hours as needed (nausea). 30 tablet 1    pravastatin (PRAVACHOL) 20 MG tablet Take 20 mg by mouth once daily.      sildenafiL (VIAGRA) 100 MG tablet Take 1 tablet (100 mg total) by mouth daily as needed. 30 tablet 11    traMADoL (ULTRAM) 50 mg tablet Take 50 mg by mouth every 6 (six) hours as needed.      valproic acid (DEPAKENE) 250 mg capsule Take 1 capsule (250 mg total) by mouth 4 (four) times daily. 120 capsule 11    levothyroxine (SYNTHROID) 150 MCG tablet Take 1 tablet (150 mcg total) by mouth once daily. 30 tablet 11     No current facility-administered medications for this visit.         ROS  Review of Systems   Constitutional:  Negative for activity change, appetite change and unexpected weight change.   HENT:  Negative for hearing loss, rhinorrhea and trouble swallowing.    Eyes:  Negative for discharge and visual disturbance.   Respiratory:  Negative for chest tightness and wheezing.    Cardiovascular:  Negative for chest pain and palpitations.  "  Gastrointestinal:  Negative for blood in stool, constipation, diarrhea and vomiting.   Endocrine: Negative for polydipsia and polyuria.   Genitourinary:  Negative for decreased urine volume, difficulty urinating, frequency, hematuria and urgency.   Musculoskeletal:  Negative for arthralgias, joint swelling and neck pain.   Neurological:  Positive for headaches (chronic). Negative for weakness.   Psychiatric/Behavioral:  Negative for confusion and dysphoric mood.            Physical Exam  Vitals:    08/07/23 1030   BP: 106/80   BP Location: Left arm   Patient Position: Sitting   BP Method: Medium (Manual)   Pulse: 75   Temp: 97.5 °F (36.4 °C)   TempSrc: Oral   SpO2: 97%   Weight: 75.7 kg (166 lb 12.5 oz)   Height: 5' 8" (1.727 m)    Body mass index is 25.36 kg/m².  Weight: 75.7 kg (166 lb 12.5 oz)   Height: 5' 8" (172.7 cm)   Physical Exam  Constitutional:       General: He is not in acute distress.     Appearance: Normal appearance.   HENT:      Head: Normocephalic and atraumatic.   Neck:      Thyroid: No thyromegaly.      Vascular: No carotid bruit.   Cardiovascular:      Rate and Rhythm: Normal rate and regular rhythm.      Pulses: Normal pulses.      Heart sounds: Normal heart sounds.   Pulmonary:      Effort: Pulmonary effort is normal. No respiratory distress.      Breath sounds: Normal breath sounds.   Musculoskeletal:      Cervical back: Neck supple.      Right lower leg: No edema.      Left lower leg: No edema.   Lymphadenopathy:      Cervical: No cervical adenopathy.   Skin:     General: Skin is warm and dry.      Findings: No rash.   Neurological:      General: No focal deficit present.      Mental Status: He is alert and oriented to person, place, and time.   Psychiatric:         Mood and Affect: Mood normal.         Behavior: Behavior normal.         Thought Content: Thought content normal.             "

## 2023-08-08 ENCOUNTER — LAB VISIT (OUTPATIENT)
Dept: LAB | Facility: HOSPITAL | Age: 68
End: 2023-08-08
Attending: FAMILY MEDICINE
Payer: MEDICARE

## 2023-08-08 DIAGNOSIS — R73.03 PRE-DIABETES: ICD-10-CM

## 2023-08-08 DIAGNOSIS — E78.1 HYPERTRIGLYCERIDEMIA: ICD-10-CM

## 2023-08-08 DIAGNOSIS — D75.1 POLYCYTHEMIA: ICD-10-CM

## 2023-08-08 DIAGNOSIS — C61 PROSTATE CANCER: ICD-10-CM

## 2023-08-08 DIAGNOSIS — E05.90 SUBCLINICAL HYPERTHYROIDISM: ICD-10-CM

## 2023-08-08 LAB
BASOPHILS # BLD AUTO: 0.06 K/UL (ref 0–0.2)
BASOPHILS NFR BLD: 1.1 % (ref 0–1.9)
CHOLEST SERPL-MCNC: 153 MG/DL (ref 120–199)
CHOLEST/HDLC SERPL: 3.9 {RATIO} (ref 2–5)
COMPLEXED PSA SERPL-MCNC: 23.9 NG/ML (ref 0–4)
DIFFERENTIAL METHOD: ABNORMAL
EOSINOPHIL # BLD AUTO: 0.2 K/UL (ref 0–0.5)
EOSINOPHIL NFR BLD: 3.3 % (ref 0–8)
ERYTHROCYTE [DISTWIDTH] IN BLOOD BY AUTOMATED COUNT: 12.7 % (ref 11.5–14.5)
ESTIMATED AVG GLUCOSE: 94 MG/DL (ref 68–131)
HBA1C MFR BLD: 4.9 % (ref 4–5.6)
HCT VFR BLD AUTO: 45.9 % (ref 40–54)
HDLC SERPL-MCNC: 39 MG/DL (ref 40–75)
HDLC SERPL: 25.5 % (ref 20–50)
HGB BLD-MCNC: 15.4 G/DL (ref 14–18)
IMM GRANULOCYTES # BLD AUTO: 0.03 K/UL (ref 0–0.04)
IMM GRANULOCYTES NFR BLD AUTO: 0.6 % (ref 0–0.5)
LDLC SERPL CALC-MCNC: 83.4 MG/DL (ref 63–159)
LYMPHOCYTES # BLD AUTO: 1.8 K/UL (ref 1–4.8)
LYMPHOCYTES NFR BLD: 33.6 % (ref 18–48)
MCH RBC QN AUTO: 32 PG (ref 27–31)
MCHC RBC AUTO-ENTMCNC: 33.6 G/DL (ref 32–36)
MCV RBC AUTO: 95 FL (ref 82–98)
MONOCYTES # BLD AUTO: 0.7 K/UL (ref 0.3–1)
MONOCYTES NFR BLD: 13.5 % (ref 4–15)
NEUTROPHILS # BLD AUTO: 2.6 K/UL (ref 1.8–7.7)
NEUTROPHILS NFR BLD: 47.9 % (ref 38–73)
NONHDLC SERPL-MCNC: 114 MG/DL
NRBC BLD-RTO: 0 /100 WBC
PLATELET # BLD AUTO: 229 K/UL (ref 150–450)
PMV BLD AUTO: 10 FL (ref 9.2–12.9)
RBC # BLD AUTO: 4.82 M/UL (ref 4.6–6.2)
T3 SERPL-MCNC: 88 NG/DL (ref 60–180)
T4 FREE SERPL-MCNC: 1.4 NG/DL (ref 0.71–1.51)
TRIGL SERPL-MCNC: 153 MG/DL (ref 30–150)
TSH SERPL DL<=0.005 MIU/L-ACNC: 0.06 UIU/ML (ref 0.4–4)
WBC # BLD AUTO: 5.42 K/UL (ref 3.9–12.7)

## 2023-08-08 PROCEDURE — 84439 ASSAY OF FREE THYROXINE: CPT | Mod: HCNC | Performed by: FAMILY MEDICINE

## 2023-08-08 PROCEDURE — 85025 COMPLETE CBC W/AUTO DIFF WBC: CPT | Mod: HCNC | Performed by: FAMILY MEDICINE

## 2023-08-08 PROCEDURE — 84480 ASSAY TRIIODOTHYRONINE (T3): CPT | Mod: HCNC | Performed by: FAMILY MEDICINE

## 2023-08-08 PROCEDURE — 83036 HEMOGLOBIN GLYCOSYLATED A1C: CPT | Mod: HCNC | Performed by: FAMILY MEDICINE

## 2023-08-08 PROCEDURE — 80061 LIPID PANEL: CPT | Mod: HCNC | Performed by: FAMILY MEDICINE

## 2023-08-08 PROCEDURE — 36415 COLL VENOUS BLD VENIPUNCTURE: CPT | Mod: HCNC,PO | Performed by: UROLOGY

## 2023-08-08 PROCEDURE — 84153 ASSAY OF PSA TOTAL: CPT | Mod: HCNC | Performed by: UROLOGY

## 2023-08-08 PROCEDURE — 84443 ASSAY THYROID STIM HORMONE: CPT | Mod: HCNC | Performed by: FAMILY MEDICINE

## 2023-08-10 ENCOUNTER — PATIENT MESSAGE (OUTPATIENT)
Dept: FAMILY MEDICINE | Facility: CLINIC | Age: 68
End: 2023-08-10
Payer: MEDICARE

## 2023-08-10 DIAGNOSIS — E03.9 HYPOTHYROIDISM, UNSPECIFIED TYPE: ICD-10-CM

## 2023-08-10 DIAGNOSIS — R73.03 PRE-DIABETES: ICD-10-CM

## 2023-08-10 DIAGNOSIS — E78.1 HYPERTRIGLYCERIDEMIA: Primary | ICD-10-CM

## 2023-08-10 RX ORDER — PRAVASTATIN SODIUM 40 MG/1
40 TABLET ORAL DAILY
Qty: 30 TABLET | Refills: 11 | Status: SHIPPED | OUTPATIENT
Start: 2023-08-10 | End: 2023-09-08 | Stop reason: SDUPTHER

## 2023-08-10 NOTE — TELEPHONE ENCOUNTER
Patient was informed of result(s) via [facility].  Please schedule fasting labs before appt in Feb.

## 2023-08-21 ENCOUNTER — OFFICE VISIT (OUTPATIENT)
Dept: RADIATION ONCOLOGY | Facility: CLINIC | Age: 68
End: 2023-08-21
Attending: RADIOLOGY
Payer: MEDICARE

## 2023-08-21 VITALS
BODY MASS INDEX: 25.46 KG/M2 | RESPIRATION RATE: 16 BRPM | HEART RATE: 57 BPM | HEIGHT: 68 IN | WEIGHT: 168 LBS | DIASTOLIC BLOOD PRESSURE: 84 MMHG | SYSTOLIC BLOOD PRESSURE: 131 MMHG

## 2023-08-21 DIAGNOSIS — C61 PROSTATE CANCER: Primary | ICD-10-CM

## 2023-08-21 PROCEDURE — 3288F FALL RISK ASSESSMENT DOCD: CPT | Mod: HCNC,CPTII,S$GLB, | Performed by: RADIOLOGY

## 2023-08-21 PROCEDURE — 4010F ACE/ARB THERAPY RXD/TAKEN: CPT | Mod: HCNC,CPTII,S$GLB, | Performed by: RADIOLOGY

## 2023-08-21 PROCEDURE — 1126F AMNT PAIN NOTED NONE PRSNT: CPT | Mod: HCNC,CPTII,S$GLB, | Performed by: RADIOLOGY

## 2023-08-21 PROCEDURE — 99999 PR PBB SHADOW E&M-EST. PATIENT-LVL IV: CPT | Mod: PBBFAC,HCNC,, | Performed by: RADIOLOGY

## 2023-08-21 PROCEDURE — 99212 PR OFFICE/OUTPT VISIT, EST, LEVL II, 10-19 MIN: ICD-10-PCS | Mod: HCNC,S$GLB,, | Performed by: RADIOLOGY

## 2023-08-21 PROCEDURE — 1159F MED LIST DOCD IN RCRD: CPT | Mod: HCNC,CPTII,S$GLB, | Performed by: RADIOLOGY

## 2023-08-21 PROCEDURE — 3288F PR FALLS RISK ASSESSMENT DOCUMENTED: ICD-10-PCS | Mod: HCNC,CPTII,S$GLB, | Performed by: RADIOLOGY

## 2023-08-21 PROCEDURE — 1126F PR PAIN SEVERITY QUANTIFIED, NO PAIN PRESENT: ICD-10-PCS | Mod: HCNC,CPTII,S$GLB, | Performed by: RADIOLOGY

## 2023-08-21 PROCEDURE — 1160F RVW MEDS BY RX/DR IN RCRD: CPT | Mod: HCNC,CPTII,S$GLB, | Performed by: RADIOLOGY

## 2023-08-21 PROCEDURE — 3008F PR BODY MASS INDEX (BMI) DOCUMENTED: ICD-10-PCS | Mod: HCNC,CPTII,S$GLB, | Performed by: RADIOLOGY

## 2023-08-21 PROCEDURE — 3044F HG A1C LEVEL LT 7.0%: CPT | Mod: HCNC,CPTII,S$GLB, | Performed by: RADIOLOGY

## 2023-08-21 PROCEDURE — 99212 OFFICE O/P EST SF 10 MIN: CPT | Mod: HCNC,S$GLB,, | Performed by: RADIOLOGY

## 2023-08-21 PROCEDURE — 99999 PR PBB SHADOW E&M-EST. PATIENT-LVL IV: ICD-10-PCS | Mod: PBBFAC,HCNC,, | Performed by: RADIOLOGY

## 2023-08-21 PROCEDURE — 3044F PR MOST RECENT HEMOGLOBIN A1C LEVEL <7.0%: ICD-10-PCS | Mod: HCNC,CPTII,S$GLB, | Performed by: RADIOLOGY

## 2023-08-21 PROCEDURE — 3079F PR MOST RECENT DIASTOLIC BLOOD PRESSURE 80-89 MM HG: ICD-10-PCS | Mod: HCNC,CPTII,S$GLB, | Performed by: RADIOLOGY

## 2023-08-21 PROCEDURE — 1101F PR PT FALLS ASSESS DOC 0-1 FALLS W/OUT INJ PAST YR: ICD-10-PCS | Mod: HCNC,CPTII,S$GLB, | Performed by: RADIOLOGY

## 2023-08-21 PROCEDURE — 3079F DIAST BP 80-89 MM HG: CPT | Mod: HCNC,CPTII,S$GLB, | Performed by: RADIOLOGY

## 2023-08-21 PROCEDURE — 3075F PR MOST RECENT SYSTOLIC BLOOD PRESS GE 130-139MM HG: ICD-10-PCS | Mod: HCNC,CPTII,S$GLB, | Performed by: RADIOLOGY

## 2023-08-21 PROCEDURE — 4010F PR ACE/ARB THEARPY RXD/TAKEN: ICD-10-PCS | Mod: HCNC,CPTII,S$GLB, | Performed by: RADIOLOGY

## 2023-08-21 PROCEDURE — 3008F BODY MASS INDEX DOCD: CPT | Mod: HCNC,CPTII,S$GLB, | Performed by: RADIOLOGY

## 2023-08-21 PROCEDURE — 1159F PR MEDICATION LIST DOCUMENTED IN MEDICAL RECORD: ICD-10-PCS | Mod: HCNC,CPTII,S$GLB, | Performed by: RADIOLOGY

## 2023-08-21 PROCEDURE — 1160F PR REVIEW ALL MEDS BY PRESCRIBER/CLIN PHARMACIST DOCUMENTED: ICD-10-PCS | Mod: HCNC,CPTII,S$GLB, | Performed by: RADIOLOGY

## 2023-08-21 PROCEDURE — 1101F PT FALLS ASSESS-DOCD LE1/YR: CPT | Mod: HCNC,CPTII,S$GLB, | Performed by: RADIOLOGY

## 2023-08-21 PROCEDURE — 3075F SYST BP GE 130 - 139MM HG: CPT | Mod: HCNC,CPTII,S$GLB, | Performed by: RADIOLOGY

## 2023-08-21 NOTE — PROGRESS NOTES
[facility]    Patient ID: Anthony Joseph Rockweiler is a 68 y.o. male.    Chief Complaint: Prostate Cancer (Discuss treatment options)    Mr. Rockweiler has a history of Stage IIIA (T2a, N0, M0, PSA > 20 GG1) prostate cancer  He presented with a  screening PSA of 29.5 ng/ml. in February of 2023.  He admitted to using weekly testosterone cypionate for several years.  Biopsies on 5/2/23 revealed [place] 6 (3+3) adenocarcinoma involving 11% of the core from the Rt. medial mid gland.  The remaining 11 cores revealed benign prostatic tissue. PSMA scan on 6/19/23 revealed focal increased radiotracer uptake in the central aspect of the prostate (max SUV 4.9) but no evidence of regional or distant metastatic disease.  MRI scan revealed a 47 cc  prostate with a 1.6 cm T2 hypointense lesion in the peripheral Rt. mid gland, PI-RADS 5 with no extraprostatic extension.  There was a 1.9 cm T2 hypointense lesion in the base mid transitional zone, PI-RADS 3 with no extraprostatic extension.  The seminal vesicles and neurovascular bundles were unremarkable. We discussed treatment options.  The patient wanted to delay therapy and repeat his PSA since his testosterone replacement was discontinued.  Today the patient states he feels well.  No complaints.       Review of Systems   Constitutional:  Negative for activity change, appetite change and fatigue.   Gastrointestinal:  Negative for change in bowel habit, constipation, diarrhea and change in bowel habit.   Genitourinary:  Negative for bladder incontinence, difficulty urinating, dysuria, frequency and hematuria.     Physical Exam  Constitutional:       General: He is not in acute distress.     Appearance: Normal appearance.   Neurological:      Mental Status: He is alert and oriented to person, place, and time.   Psychiatric:         Mood and Affect: Mood normal.         Judgment: Judgment normal.        Latest Reference Range & Units 02/01/23 13:05 06/20/23  08:20 08/04/23 10:20 08/08/23 07:58   PSA, Screen 0.00 - 4.00 ng/mL 29.5 (H)      PSA Diagnostic 0.00 - 4.00 ng/mL  21.0 (H) 19.8 (H) 23.9 (H)   (H): Data is abnormally high       Assessment and Plan   Prostate Cancer - Discussed his PSA results.  Explained he currently has no real change from June  PSA remains > 20.  Discussed possible radiotherapy with external beam therapy delivering 70 Gy in 28 fractions or 44 Gy with prostate implant.  Discussed possible combined therapy with radiotherapy and hormonal deprivation therapy.  Will plan to check Polaris molecular score.  Plan follow up after Polaris results return.

## 2023-09-08 ENCOUNTER — TELEPHONE (OUTPATIENT)
Dept: FAMILY MEDICINE | Facility: CLINIC | Age: 68
End: 2023-09-08
Payer: MEDICARE

## 2023-09-08 DIAGNOSIS — E78.1 HYPERTRIGLYCERIDEMIA: ICD-10-CM

## 2023-09-08 RX ORDER — PRAVASTATIN SODIUM 40 MG/1
40 TABLET ORAL DAILY
Qty: 30 TABLET | Refills: 11 | Status: SHIPPED | OUTPATIENT
Start: 2023-09-08 | End: 2024-02-19

## 2023-09-08 NOTE — TELEPHONE ENCOUNTER
----- Message from Lilly Peterson sent at 9/8/2023  3:34 PM CDT -----  Regarding: Refill Request  Type: RX Refill Request      Who Called: [facility]       Refill or New Rx:      RX Name and Strength: pravastatin (PRAVACHOL) 40 MG tablet--- went ot the wrong pharmacy       Preferred Pharmacy with phone number:    [facility] - [place], [place] - [address]  [address] [facility] [address] [address]  Phone: [phone] Fax: [phone]        Would the patient rather a call back or a response via [facility]? Call       Best Call Back Number: .[phone]

## 2023-09-11 ENCOUNTER — PATIENT MESSAGE (OUTPATIENT)
Dept: RADIATION ONCOLOGY | Facility: CLINIC | Age: 68
End: 2023-09-11
Payer: MEDICARE

## 2023-09-26 ENCOUNTER — OFFICE VISIT (OUTPATIENT)
Dept: UROLOGY | Facility: CLINIC | Age: 68
End: 2023-09-26
Payer: MEDICARE

## 2023-09-26 VITALS — WEIGHT: 175.13 LBS | BODY MASS INDEX: 26.63 KG/M2

## 2023-09-26 DIAGNOSIS — C61 PROSTATE CANCER: Primary | ICD-10-CM

## 2023-09-26 DIAGNOSIS — R35.1 NOCTURIA: ICD-10-CM

## 2023-09-26 DIAGNOSIS — E29.1 HYPOGONADISM IN MALE: ICD-10-CM

## 2023-09-26 DIAGNOSIS — N52.8 OTHER MALE ERECTILE DYSFUNCTION: ICD-10-CM

## 2023-09-26 PROCEDURE — 1126F AMNT PAIN NOTED NONE PRSNT: CPT | Mod: HCNC,CPTII,S$GLB, | Performed by: UROLOGY

## 2023-09-26 PROCEDURE — 1101F PT FALLS ASSESS-DOCD LE1/YR: CPT | Mod: HCNC,CPTII,S$GLB, | Performed by: UROLOGY

## 2023-09-26 PROCEDURE — 99999 PR PBB SHADOW E&M-EST. PATIENT-LVL III: ICD-10-PCS | Mod: PBBFAC,HCNC,, | Performed by: UROLOGY

## 2023-09-26 PROCEDURE — 1160F PR REVIEW ALL MEDS BY PRESCRIBER/CLIN PHARMACIST DOCUMENTED: ICD-10-PCS | Mod: HCNC,CPTII,S$GLB, | Performed by: UROLOGY

## 2023-09-26 PROCEDURE — 99214 OFFICE O/P EST MOD 30 MIN: CPT | Mod: HCNC,S$GLB,, | Performed by: UROLOGY

## 2023-09-26 PROCEDURE — 1160F RVW MEDS BY RX/DR IN RCRD: CPT | Mod: HCNC,CPTII,S$GLB, | Performed by: UROLOGY

## 2023-09-26 PROCEDURE — 3008F BODY MASS INDEX DOCD: CPT | Mod: HCNC,CPTII,S$GLB, | Performed by: UROLOGY

## 2023-09-26 PROCEDURE — 1159F MED LIST DOCD IN RCRD: CPT | Mod: HCNC,CPTII,S$GLB, | Performed by: UROLOGY

## 2023-09-26 PROCEDURE — 3288F PR FALLS RISK ASSESSMENT DOCUMENTED: ICD-10-PCS | Mod: HCNC,CPTII,S$GLB, | Performed by: UROLOGY

## 2023-09-26 PROCEDURE — 3008F PR BODY MASS INDEX (BMI) DOCUMENTED: ICD-10-PCS | Mod: HCNC,CPTII,S$GLB, | Performed by: UROLOGY

## 2023-09-26 PROCEDURE — 99999 PR PBB SHADOW E&M-EST. PATIENT-LVL III: CPT | Mod: PBBFAC,HCNC,, | Performed by: UROLOGY

## 2023-09-26 PROCEDURE — 3044F PR MOST RECENT HEMOGLOBIN A1C LEVEL <7.0%: ICD-10-PCS | Mod: HCNC,CPTII,S$GLB, | Performed by: UROLOGY

## 2023-09-26 PROCEDURE — 3044F HG A1C LEVEL LT 7.0%: CPT | Mod: HCNC,CPTII,S$GLB, | Performed by: UROLOGY

## 2023-09-26 PROCEDURE — 3288F FALL RISK ASSESSMENT DOCD: CPT | Mod: HCNC,CPTII,S$GLB, | Performed by: UROLOGY

## 2023-09-26 PROCEDURE — 1101F PR PT FALLS ASSESS DOC 0-1 FALLS W/OUT INJ PAST YR: ICD-10-PCS | Mod: HCNC,CPTII,S$GLB, | Performed by: UROLOGY

## 2023-09-26 PROCEDURE — 1159F PR MEDICATION LIST DOCUMENTED IN MEDICAL RECORD: ICD-10-PCS | Mod: HCNC,CPTII,S$GLB, | Performed by: UROLOGY

## 2023-09-26 PROCEDURE — 4010F PR ACE/ARB THEARPY RXD/TAKEN: ICD-10-PCS | Mod: HCNC,CPTII,S$GLB, | Performed by: UROLOGY

## 2023-09-26 PROCEDURE — 99214 PR OFFICE/OUTPT VISIT, EST, LEVL IV, 30-39 MIN: ICD-10-PCS | Mod: HCNC,S$GLB,, | Performed by: UROLOGY

## 2023-09-26 PROCEDURE — 4010F ACE/ARB THERAPY RXD/TAKEN: CPT | Mod: HCNC,CPTII,S$GLB, | Performed by: UROLOGY

## 2023-09-26 PROCEDURE — 1126F PR PAIN SEVERITY QUANTIFIED, NO PAIN PRESENT: ICD-10-PCS | Mod: HCNC,CPTII,S$GLB, | Performed by: UROLOGY

## 2023-09-26 NOTE — PROGRESS NOTES
Subjective:       Patient ID: Anthony Joseph Rockweiler is a 68 y.o. male The patient's last visit with me was on 6/22/2023.     Chief Complaint:   Chief Complaint   Patient presents with    Follow-up       Prostate Cancer  He underwent a prostate needle biopsy on 5/2/2023.  His biopsy was indicated due to: Elevated PSA.  Afterwards he experienced: Gross Hematuria.  These symptoms have resolved.  His PSA prior to biopsy was 29.5.  PSA Density 0.45. His prostate size was 60 grams.  The ultrasound did not show a median lobe.  He currently does have erectile dysfunction.  His pathology showed: Prostate Cancer.     6/22/2023  He is back with a new PSA, MRI, and PSMA PET scan.    09/26/2023  He met with Dr. Vuong.  They are waiting on his Polaris score.    ACTIVE MEDICAL ISSUES:  Patient Active Problem List   Diagnosis    History of seizures    Headache    Nuclear sclerosis of both eyes    Moderate benzodiazepine use disorder    Prostate cancer       ALLERGIES AND MEDICATIONS: updated and reviewed.  Review of patient's allergies indicates:   Allergen Reactions    Paroxetine      Urinary problems     Current Outpatient Medications   Medication Sig    amLODIPine (NORVASC) 5 MG tablet Take 5 mg by mouth 2 (two) times daily.    carvedilol (COREG) 12.5 MG tablet Take 12.5 mg by mouth 2 (two) times daily with meals.    cyclobenzaprine (FLEXERIL) 10 MG tablet Take 10 mg by mouth every 8 (eight) hours as needed.    ibuprofen (ADVIL,MOTRIN) 800 MG tablet TAKE 1 TABLET BY MOUTH THREE TIMES DAILY AS NEEDED WITH FOOD    levothyroxine (SYNTHROID) 150 MCG tablet Take 1 tablet (150 mcg total) by mouth once daily.    LORazepam (ATIVAN) 1 MG tablet Take 1 tablet (1 mg total) by mouth 2 (two) times daily as needed for Anxiety.    losartan (COZAAR) 100 MG tablet Take 100 mg by mouth once daily.    meloxicam (MOBIC) 15 MG tablet Take 15 mg by mouth.    ondansetron (ZOFRAN-ODT) 4 MG TbDL Take 1 tablet (4 mg total) by mouth every 12  (twelve) hours as needed (nausea).    pravastatin (PRAVACHOL) 40 MG tablet Take 1 tablet (40 mg total) by mouth once daily.    sildenafiL (VIAGRA) 100 MG tablet Take 1 tablet (100 mg total) by mouth daily as needed.    traMADoL (ULTRAM) 50 mg tablet Take 50 mg by mouth every 6 (six) hours as needed.    valproic acid (DEPAKENE) 250 mg capsule Take 1 capsule (250 mg total) by mouth 4 (four) times daily.     No current facility-administered medications for this visit.       Review of Systems   Constitutional:  Negative for chills and fever.   HENT:  Negative for congestion.    Respiratory:  Negative for chest tightness and shortness of breath.    Cardiovascular:  Negative for chest pain and palpitations.   Gastrointestinal:  Negative for abdominal pain, constipation, diarrhea, nausea and vomiting.   Genitourinary:  Negative for difficulty urinating, dysuria, flank pain, hematuria and urgency.   Musculoskeletal:  Negative for arthralgias.   Neurological:  Negative for dizziness.   Psychiatric/Behavioral:  Negative for confusion.        Objective:      Vitals:    09/26/23 0945   Weight: 79.5 kg (175 lb 2.5 oz)         Physical Exam  Vitals and nursing note reviewed.   Constitutional:       Appearance: He is well-developed.   HENT:      Head: Normocephalic.   Eyes:      Conjunctiva/sclera: Conjunctivae normal.   Neck:      Thyroid: No thyromegaly.      Trachea: No tracheal deviation.   Cardiovascular:      Rate and Rhythm: Normal rate.      Heart sounds: Normal heart sounds.   Pulmonary:      Effort: Pulmonary effort is normal. No respiratory distress.      Breath sounds: Normal breath sounds. No wheezing.   Abdominal:      General: Bowel sounds are normal.      Palpations: Abdomen is soft.      Tenderness: There is no abdominal tenderness. There is no rebound.      Hernia: No hernia is present.   Musculoskeletal:         General: No tenderness. Normal range of motion.      Cervical back: Normal range of motion and neck  supple.   Lymphadenopathy:      Cervical: No cervical adenopathy.   Skin:     General: Skin is warm and dry.      Findings: No erythema or rash.   Neurological:      Mental Status: He is alert and oriented to person, place, and time.   Psychiatric:         Behavior: Behavior normal.         Thought Content: Thought content normal.         Judgment: Judgment normal.         Urine dipstick shows negative for all components.  Micro exam: negative for WBC's or RBC's.    Component Ref Range & Units 1 mo ago  (8/8/23) 1 mo ago  (8/4/23) 3 mo ago  (6/20/23)   PSA Diagnostic 0.00 - 4.00 ng/mL 23.9 High   19.8 High  CM  21.0 High  CM    Comment: The testing method is a chemiluminescent microparticle immunoassay   manufactured by [employer] and performed on the INNO[employer]   or   Regen system. Values obtained with different assay manufacturers   for   methods may be different and cannot be used interchangeably.   PSA Expected levels:   Hormonal Therapy: <0.05 ng/ml   Prostatectomy: <0.01 ng/ml   Radiation Therapy: <1.00 ng/ml    Resulting Agency  [facility]              Narrative  Performed by: [facility]  PSA 3 months      Specimen Collected: 08/08/23 07:58 Last Resulted: 08/08/23 15:58             MRI Prostate W W/O Contrast  Order: [id number]  Status: Final result     Visible to patient: Yes (seen)     Next appt: Today at 08:30 AM in Urology (José Luis Foley MD)     Dx: Prostate cancer     0 Result Notes  Details    Reading Physician Reading Date Result Priority   Andriy Mathur MD  [phone] 6/20/2023 Routine     Narrative & Impression  EXAMINATION:  MRI PROSTATE W W/O CONTRAST     CLINICAL HISTORY:  Prostate cancer, staging;  Malignant neoplasm of prostate     Additional history: None provided.     TECHNIQUE:  Multiparametric MRI of the prostate/pelvis performed on a 3T scanner with phase pelvic coil. Multiplanar, multisequence images including high resolution, small field-of-view T2-WI; axial  diffusion weighted images with multiple B-values and creation of ADC-maps; and dynamic contrast enhanced T1-weighted images through the prostate were obtained before, during, and after the administration of 10 cc intravenous gadolinium.     COMPARISON:  PET-CT 06/19/2023     FINDINGS:  Previous biopsy: [place] 6 right mid gland, 05/02/2023     PSA: 29.5 ng/mL 02/01/2023     Prior therapy: None     Prostate: 5.0 x 4.1 x 4.7 cm corresponding to a computed volume of 47 cc.     Peripheral zone: Focal lesion, as below.  Additional superimposed diffuse T2 signal hypointensity.     Lesion (MICHELLE) #P-1     Location: Side: Right; Region: mid; Zone: posterior peripheral zone laterally     Greatest dimension: 1.6 cm     T2-WI: Same as 4 but ?1.5 cm in greatest dimension or definite extraprostatic extension/invasive behavior, score 5.     DWI/ADC: Same as 4 but ?1.5 cm in greatest dimension or definite extraprostatic extension/invasive behavior, score 5.     DCE: Positive     Extraprostatic extension: Negative     PI-RADS assessment category: 5     Transitional zone: Focal lesion, as below.  Additional BPH nodules.     Lesion (MICHELLE) #T-1     Location: Side:Midline; Region: Base and mid; Zone: anterior     Greatest dimension: 1.9 cm     T2-WI: Heterogeneous signal intensity withobscured margins, score 3.     DWI/ADC: Focal (discrete and different from the background) hypointense on ADC and/or focal hyperintense on high b-value DWI; may be markedly hypointense on ADC or markedly hyperintense on high b-value DWI, but not both, score 3.     DCE: Positive     Extraprostatic extension: Negative     PI-RADS assessment category: 3     Neurovascular bundle: Normal appearance.     Seminal vesicles: Normal appearance.     Adjacent Organ Involvement: No evidence for urinary bladder or rectal invasion.     Lymphadenopathy: None.     Other Findings: Mild diffuse bladder wall thickening.  Colonic diverticulosis.  Sclerotic focus within the right  femur which could represent a bone island, no corresponding radiotracer avidity identified on prior PSMA PET.     Impression:     1. PI-RADS 5 lesion within the right mid posterior peripheral zone laterally.  No evidence of extracapsular extension.  2. PI-RADS 3 lesion within the midline base and mid anterior transition zone.  No evidence of extracapsular extension.  Overall Assessment: PI-RADS 5 - Very high (clinically significant cancer is highly likely to be present)     Number of targets created for potential MR/US fusion biopsy     Peripheral zone: 1     Transition zone: 1        Electronically signed by: Andriy Mathur  Date:                                            06/20/2023  Time:                                           06:59   NM PET CT F 18 PYL PSMA, Midthigh to Vertex  Order: [id number]  Status: Final result     Visible to patient: Yes (not seen)     Next appt: Today at 08:30 AM in Urology (José Luis Foley MD)     Dx: Prostate cancer     1 Result Note    1 Patient Communication  Details    Reading Physician Reading Date Result Priority   Aman Eckert IV, MD  [phone] 6/19/2023 Routine   Ramiro Sue MD  [phone]  [phone] 6/19/2023      Narrative & Impression  EXAMINATION:  NM PET CT F 18 PYL PSMA, MIDTHIGH TO VERTEX     CLINICAL HISTORY:  Malignant neoplasm of prostate     TECHNIQUE:  Approximately 60 minutes following the intravenous injection of 9.69 mCi F-18 piflufolastat, PET images were acquired from the proximal thighs to the skull vertex. CT images were also acquired for attenuation correction and anatomic localization and performed without oral or IV contrast.     COMPARISON:  Outside report CT head 04/04/2019     FINDINGS:  In the head and neck, there is physiologic uptake in the lacrimal and salivary glands, and there are no tracer avid lesions suspicious for malignancy.     In the chest, there are upper limit of normal sized bilateral axillary lymph nodes with  low level tracer uptake, relatively symmetric and nonspecific.  No definite abnormal uptake suspicious for malignancy.     In the abdomen and pelvis, there is physiologic distribution in the liver, spleen, bowel, and genitourinary tract, and there are no tracer avid lesions suspicious for malignancy.     There is focal increased radiotracer uptake within the central aspect of the prostate with SUV max of 4.9 (image 54), findings may be confounded by tracer in the prostatic urethra.     In the bones, there is physiologic uptake in the bone marrow, and there are no tracer avid lesions suspicious for malignancy.     Additional CT findings: Remote appearing inferior left frontal lobe encephalomalacia, recommend correlation with clinical history.  Thyroid is likely surgically absent.  Bibasilar dependent atelectasis.  Moderate to large hiatal hernia.  Prostate is mildly enlarged with dystrophic calcifications.  Diverticulosis coli.  Small fat containing umbilical hernia.  Mild aortoiliac atherosclerosis.     Impression:     In this patient with biopsy-proven prostate cancer, there are no focal suspicious tracer avid lesions to indicate metastatic disease.     Additional findings as above, including moderate to large sized hiatal hernia.     Electronically signed by resident: Ramiro Sue  Date:                                            06/19/2023  Time:                                           14:43     Electronically signed by: Aman Eckert  Date:                                            06/19/2023  Time:                                           16:53       Collected: 05/02/23 1119   Result status: Final   Resulting lab: [facility]   Value: 1. Prostate, left base lateral, biopsy:   Benign prostatic tissue     2. Prostate, left base medial, biopsy:   Benign prostatic tissue     3. Prostate, left mid lateral, biopsy:   Benign prostatic tissue     4. Prostate, left mid medial, biopsy:    Benign prostatic tissue     5. Prostate, left apex lateral, biopsy:   Atypical small acinar proliferation (ASAP)     6. Prostate, left apex medial, biopsy:   Benign prostatic tissue     7. Prostate, right base lateral, biopsy:   Benign fibrous tissue     8. Prostate, right base medial, biopsy:   Benign prostatic tissue     9. Prostate, right mid lateral, biopsy:   Benign prostatic tissue     10. Prostate, right mid medial, biopsy:   Prostatic adenocarcinoma, Grade Group 1, [place] score (3+3)=6   The tumor is present in 1/1 cores   The tumor measures 1 mm in length, approximately 11% of total prostatic tissue   No perineural invasion identified     11. Prostate, right apex lateral, biopsy:   Benign prostatic tissue     12. Prostate, right apex medial, biopsy:   Benign prostatic tissue    Comment: Interp By Jessica Granados D.O., Signed on 05/05/2023 at 13:34       Assessment:       1. Prostate cancer    2. Other male erectile dysfunction    3. Nocturia    4. Hypogonadism in male              Plan:       1. Prostate cancer  Awaiting Polaris score  I will defer to Dr. Vuong    2. Other male erectile dysfunction  Viagra    3. Nocturia  Limit evening fluids    4. Hypogonadism in male  Hold on Testosterone until his cancer is treated             Follow up in about 3 months (around 12/26/2023) for Follow up Established, Review PSA.

## 2023-09-29 ENCOUNTER — PATIENT MESSAGE (OUTPATIENT)
Dept: RADIATION ONCOLOGY | Facility: CLINIC | Age: 68
End: 2023-09-29
Payer: MEDICARE

## 2023-10-02 ENCOUNTER — APPOINTMENT (OUTPATIENT)
Dept: RADIATION THERAPY | Facility: OTHER | Age: 68
End: 2023-10-02
Attending: RADIOLOGY
Payer: MEDICARE

## 2023-10-02 ENCOUNTER — HOSPITAL ENCOUNTER (OUTPATIENT)
Dept: RADIATION THERAPY | Facility: HOSPITAL | Age: 68
Discharge: HOME | End: 2023-10-02
Attending: RADIOLOGY
Payer: MEDICARE

## 2023-10-09 ENCOUNTER — OFFICE VISIT (OUTPATIENT)
Dept: RADIATION ONCOLOGY | Facility: CLINIC | Age: 68
End: 2023-10-09
Attending: RADIOLOGY
Payer: MEDICARE

## 2023-10-09 VITALS
DIASTOLIC BLOOD PRESSURE: 83 MMHG | BODY MASS INDEX: 26.69 KG/M2 | HEART RATE: 72 BPM | RESPIRATION RATE: 16 BRPM | WEIGHT: 176.13 LBS | HEIGHT: 68 IN | SYSTOLIC BLOOD PRESSURE: 129 MMHG

## 2023-10-09 DIAGNOSIS — C61 PROSTATE CANCER: Primary | ICD-10-CM

## 2023-10-09 PROCEDURE — 3288F FALL RISK ASSESSMENT DOCD: CPT | Mod: HCNC,CPTII,S$GLB, | Performed by: RADIOLOGY

## 2023-10-09 PROCEDURE — 1101F PT FALLS ASSESS-DOCD LE1/YR: CPT | Mod: HCNC,CPTII,S$GLB, | Performed by: RADIOLOGY

## 2023-10-09 PROCEDURE — 1101F PR PT FALLS ASSESS DOC 0-1 FALLS W/OUT INJ PAST YR: ICD-10-PCS | Mod: HCNC,CPTII,S$GLB, | Performed by: RADIOLOGY

## 2023-10-09 PROCEDURE — 3079F PR MOST RECENT DIASTOLIC BLOOD PRESSURE 80-89 MM HG: ICD-10-PCS | Mod: HCNC,CPTII,S$GLB, | Performed by: RADIOLOGY

## 2023-10-09 PROCEDURE — 3044F PR MOST RECENT HEMOGLOBIN A1C LEVEL <7.0%: ICD-10-PCS | Mod: HCNC,CPTII,S$GLB, | Performed by: RADIOLOGY

## 2023-10-09 PROCEDURE — 3079F DIAST BP 80-89 MM HG: CPT | Mod: HCNC,CPTII,S$GLB, | Performed by: RADIOLOGY

## 2023-10-09 PROCEDURE — 3288F PR FALLS RISK ASSESSMENT DOCUMENTED: ICD-10-PCS | Mod: HCNC,CPTII,S$GLB, | Performed by: RADIOLOGY

## 2023-10-09 PROCEDURE — 1159F MED LIST DOCD IN RCRD: CPT | Mod: HCNC,CPTII,S$GLB, | Performed by: RADIOLOGY

## 2023-10-09 PROCEDURE — 99212 OFFICE O/P EST SF 10 MIN: CPT | Mod: HCNC,S$GLB,, | Performed by: RADIOLOGY

## 2023-10-09 PROCEDURE — 3008F PR BODY MASS INDEX (BMI) DOCUMENTED: ICD-10-PCS | Mod: HCNC,CPTII,S$GLB, | Performed by: RADIOLOGY

## 2023-10-09 PROCEDURE — 3008F BODY MASS INDEX DOCD: CPT | Mod: HCNC,CPTII,S$GLB, | Performed by: RADIOLOGY

## 2023-10-09 PROCEDURE — 1126F PR PAIN SEVERITY QUANTIFIED, NO PAIN PRESENT: ICD-10-PCS | Mod: HCNC,CPTII,S$GLB, | Performed by: RADIOLOGY

## 2023-10-09 PROCEDURE — 4010F ACE/ARB THERAPY RXD/TAKEN: CPT | Mod: HCNC,CPTII,S$GLB, | Performed by: RADIOLOGY

## 2023-10-09 PROCEDURE — 4010F PR ACE/ARB THEARPY RXD/TAKEN: ICD-10-PCS | Mod: HCNC,CPTII,S$GLB, | Performed by: RADIOLOGY

## 2023-10-09 PROCEDURE — 1160F RVW MEDS BY RX/DR IN RCRD: CPT | Mod: HCNC,CPTII,S$GLB, | Performed by: RADIOLOGY

## 2023-10-09 PROCEDURE — 99999 PR PBB SHADOW E&M-EST. PATIENT-LVL III: CPT | Mod: PBBFAC,HCNC,, | Performed by: RADIOLOGY

## 2023-10-09 PROCEDURE — 3044F HG A1C LEVEL LT 7.0%: CPT | Mod: HCNC,CPTII,S$GLB, | Performed by: RADIOLOGY

## 2023-10-09 PROCEDURE — 99999 PR PBB SHADOW E&M-EST. PATIENT-LVL III: ICD-10-PCS | Mod: PBBFAC,HCNC,, | Performed by: RADIOLOGY

## 2023-10-09 PROCEDURE — 3074F PR MOST RECENT SYSTOLIC BLOOD PRESSURE < 130 MM HG: ICD-10-PCS | Mod: HCNC,CPTII,S$GLB, | Performed by: RADIOLOGY

## 2023-10-09 PROCEDURE — 3074F SYST BP LT 130 MM HG: CPT | Mod: HCNC,CPTII,S$GLB, | Performed by: RADIOLOGY

## 2023-10-09 PROCEDURE — 1126F AMNT PAIN NOTED NONE PRSNT: CPT | Mod: HCNC,CPTII,S$GLB, | Performed by: RADIOLOGY

## 2023-10-09 PROCEDURE — 1159F PR MEDICATION LIST DOCUMENTED IN MEDICAL RECORD: ICD-10-PCS | Mod: HCNC,CPTII,S$GLB, | Performed by: RADIOLOGY

## 2023-10-09 PROCEDURE — 1160F PR REVIEW ALL MEDS BY PRESCRIBER/CLIN PHARMACIST DOCUMENTED: ICD-10-PCS | Mod: HCNC,CPTII,S$GLB, | Performed by: RADIOLOGY

## 2023-10-09 PROCEDURE — 99212 PR OFFICE/OUTPT VISIT, EST, LEVL II, 10-19 MIN: ICD-10-PCS | Mod: HCNC,S$GLB,, | Performed by: RADIOLOGY

## 2023-10-09 NOTE — PROGRESS NOTES
[facility]    Patient ID: Anthony Joseph Rockweiler is a 68 y.o. male.    Chief Complaint: Prostate Cancer (Rev prolaris results)    Mr. Rockweiler has a history of Stage IIIA (T2a, N0, M0, PSA > 20 GG1) prostate cancer  He presented with a  screening PSA of 29.5 ng/ml. in February of 2023.  He admitted to using weekly testosterone cypionate for several years.  Biopsies on 5/2/23 revealed [place] 6 (3+3) adenocarcinoma involving 11% of the core from the Rt. medial mid gland.  The remaining 11 cores revealed benign prostatic tissue. PSMA scan on 6/19/23 revealed focal increased radiotracer uptake in the central aspect of the prostate (max SUV 4.9) but no evidence of regional or distant metastatic disease.  MRI scan revealed a 47 cc  prostate with a 1.6 cm T2 hypointense lesion in the peripheral Rt. mid gland, PI-RADS 5 with no extraprostatic extension.  There was a 1.9 cm T2 hypointense lesion in the base mid transitional zone, PI-RADS 3 with no extraprostatic extension.  The seminal vesicles and neurovascular bundles were unremarkable. We discussed treatment options.  The patient wanted to delay therapy and repeat his PSA since his testosterone replacement was discontinued.  We discussed a number of treatment options.  The patient returns to discuss his Polaris results.        Review of Systems   Constitutional:  Negative for activity change, appetite change, chills and fatigue.   Gastrointestinal:  Negative for constipation, diarrhea and fecal incontinence.   Genitourinary:  Negative for bladder incontinence, difficulty urinating, dysuria and frequency.     Physical Exam  Constitutional:       General: He is not in acute distress.     Appearance: Normal appearance.   Neurological:      Mental Status: He is alert and oriented to person, place, and time.   Psychiatric:         Mood and Affect: Mood normal.         Judgment: Judgment normal.            Assessment and Plan     1. Prostate  cancer    I discussed the Polaris results with the patient.  Explained his options include RALP with bilateral node dissection vs definitive radiotherapy with external beam therapy combined with prostate brachytherapy boost to the prostate gland using Iodine 125 seeds.  We also discussed possible targeted biopsies of the lesion on MRI to r/o more aggressive disease.  We had a long discussion concerning the pros and cons of each approach.  The patient feels he would like to proceed with definitive radiotherapy likely delivering 46 Gy to the prostate, seminal vesicles and pelvic nodes followed by boost to the prostate using Iodine or Palladium seeds.  Will plan simulation with treatment to begin thereafter.

## 2023-10-18 ENCOUNTER — HOSPITAL ENCOUNTER (OUTPATIENT)
Dept: RADIATION THERAPY | Facility: HOSPITAL | Age: 68
Discharge: HOME | End: 2023-10-18
Attending: RADIOLOGY
Payer: MEDICARE

## 2023-10-18 PROCEDURE — 77014 PR  CT GUIDANCE PLACEMENT RAD THERAPY FIELDS: CPT | Mod: 26,HCNC,, | Performed by: RADIOLOGY

## 2023-10-18 PROCEDURE — 77263 THER RADIOLOGY TX PLNG CPLX: CPT | Mod: HCNC,,, | Performed by: RADIOLOGY

## 2023-10-18 PROCEDURE — 77334 PR  RADN TREATMENT AID(S) COMPLX: ICD-10-PCS | Mod: 26,HCNC,, | Performed by: RADIOLOGY

## 2023-10-18 PROCEDURE — 77263 PR  RADIATION THERAPY PLAN COMPLEX: ICD-10-PCS | Mod: HCNC,,, | Performed by: RADIOLOGY

## 2023-10-18 PROCEDURE — 77334 RADIATION TREATMENT AID(S): CPT | Mod: TC,HCNC | Performed by: RADIOLOGY

## 2023-10-18 PROCEDURE — 77014 PR  CT GUIDANCE PLACEMENT RAD THERAPY FIELDS: ICD-10-PCS | Mod: 26,HCNC,, | Performed by: RADIOLOGY

## 2023-10-18 PROCEDURE — 77014 HC CT GUIDANCE RADIATION THERAPY FLDS PLACEMENT: CPT | Mod: TC,HCNC | Performed by: RADIOLOGY

## 2023-10-18 PROCEDURE — 77334 RADIATION TREATMENT AID(S): CPT | Mod: 26,HCNC,, | Performed by: RADIOLOGY

## 2023-10-24 PROCEDURE — 77301 RADIOTHERAPY DOSE PLAN IMRT: CPT | Mod: 26,HCNC,, | Performed by: RADIOLOGY

## 2023-10-24 PROCEDURE — 77301 PR  INTEN MOD RADIOTHER PLAN W/DOSE VOL HIST: ICD-10-PCS | Mod: 26,HCNC,, | Performed by: RADIOLOGY

## 2023-10-24 PROCEDURE — 77301 RADIOTHERAPY DOSE PLAN IMRT: CPT | Mod: TC,HCNC | Performed by: RADIOLOGY

## 2023-10-25 PROCEDURE — 77427 PR CHG RADIATION,MANGEMENT,5 TX'S: ICD-10-PCS | Mod: HCNC,,, | Performed by: RADIOLOGY

## 2023-10-25 PROCEDURE — 77014 PR  CT GUIDANCE PLACEMENT RAD THERAPY FIELDS: CPT | Mod: 26,HCNC,, | Performed by: RADIOLOGY

## 2023-10-25 PROCEDURE — 77338 DESIGN MLC DEVICE FOR IMRT: CPT | Mod: 26,HCNC,, | Performed by: RADIOLOGY

## 2023-10-25 PROCEDURE — 77300 RADIATION THERAPY DOSE PLAN: CPT | Mod: 26,HCNC,, | Performed by: RADIOLOGY

## 2023-10-25 PROCEDURE — 77300 RADIATION THERAPY DOSE PLAN: CPT | Mod: TC,HCNC | Performed by: RADIOLOGY

## 2023-10-25 PROCEDURE — 77385 HC IMRT, SIMPLE: CPT | Mod: HCNC | Performed by: RADIOLOGY

## 2023-10-25 PROCEDURE — 77300 PR RADIATION THERAPY,DOSIMETRY PLAN: ICD-10-PCS | Mod: 26,HCNC,, | Performed by: RADIOLOGY

## 2023-10-25 PROCEDURE — 77338 DESIGN MLC DEVICE FOR IMRT: CPT | Mod: TC,HCNC,59 | Performed by: RADIOLOGY

## 2023-10-25 PROCEDURE — 77427 RADIATION TX MANAGEMENT X5: CPT | Mod: HCNC,,, | Performed by: RADIOLOGY

## 2023-10-25 PROCEDURE — 77338 PR  MLC IMRT DESIGN & CONSTRUCTION PER IMRT PLAN: ICD-10-PCS | Mod: 26,HCNC,, | Performed by: RADIOLOGY

## 2023-10-25 PROCEDURE — 77014 PR  CT GUIDANCE PLACEMENT RAD THERAPY FIELDS: ICD-10-PCS | Mod: 26,HCNC,, | Performed by: RADIOLOGY

## 2023-10-26 PROCEDURE — 77385 HC IMRT, SIMPLE: CPT | Mod: HCNC | Performed by: RADIOLOGY

## 2023-10-26 PROCEDURE — 77014 PR  CT GUIDANCE PLACEMENT RAD THERAPY FIELDS: CPT | Mod: 26,HCNC,, | Performed by: RADIOLOGY

## 2023-10-26 PROCEDURE — 77014 PR  CT GUIDANCE PLACEMENT RAD THERAPY FIELDS: ICD-10-PCS | Mod: 26,HCNC,, | Performed by: RADIOLOGY

## 2023-10-27 PROCEDURE — 77014 PR  CT GUIDANCE PLACEMENT RAD THERAPY FIELDS: ICD-10-PCS | Mod: 26,HCNC,, | Performed by: RADIOLOGY

## 2023-10-27 PROCEDURE — 77014 PR  CT GUIDANCE PLACEMENT RAD THERAPY FIELDS: CPT | Mod: 26,HCNC,, | Performed by: RADIOLOGY

## 2023-10-27 PROCEDURE — 77385 HC IMRT, SIMPLE: CPT | Mod: HCNC | Performed by: RADIOLOGY

## 2023-10-30 ENCOUNTER — DOCUMENTATION ONLY (OUTPATIENT)
Dept: RADIATION ONCOLOGY | Facility: CLINIC | Age: 68
End: 2023-10-30
Payer: MEDICARE

## 2023-10-30 PROCEDURE — 77014 PR  CT GUIDANCE PLACEMENT RAD THERAPY FIELDS: ICD-10-PCS | Mod: 26,HCNC,, | Performed by: RADIOLOGY

## 2023-10-30 PROCEDURE — 77385 HC IMRT, SIMPLE: CPT | Mod: HCNC | Performed by: RADIOLOGY

## 2023-10-30 PROCEDURE — 77014 PR  CT GUIDANCE PLACEMENT RAD THERAPY FIELDS: CPT | Mod: 26,HCNC,, | Performed by: RADIOLOGY

## 2023-10-31 ENCOUNTER — TELEPHONE (OUTPATIENT)
Dept: FAMILY MEDICINE | Facility: CLINIC | Age: 68
End: 2023-10-31
Payer: MEDICARE

## 2023-10-31 ENCOUNTER — E-VISIT (OUTPATIENT)
Dept: FAMILY MEDICINE | Facility: CLINIC | Age: 68
End: 2023-10-31
Payer: MEDICARE

## 2023-10-31 VITALS
WEIGHT: 172 LBS | HEART RATE: 70 BPM | DIASTOLIC BLOOD PRESSURE: 85 MMHG | BODY MASS INDEX: 25.48 KG/M2 | TEMPERATURE: 99 F | SYSTOLIC BLOOD PRESSURE: 128 MMHG | HEIGHT: 69 IN

## 2023-10-31 DIAGNOSIS — J06.9 VIRAL UPPER RESPIRATORY TRACT INFECTION: Primary | ICD-10-CM

## 2023-10-31 PROCEDURE — 99421 PR E&M, ONLINE DIGIT, EST, < 7 DAYS, 5-10 MINS: ICD-10-PCS | Mod: ,,, | Performed by: FAMILY MEDICINE

## 2023-10-31 PROCEDURE — 77014 PR  CT GUIDANCE PLACEMENT RAD THERAPY FIELDS: CPT | Mod: 26,HCNC,, | Performed by: RADIOLOGY

## 2023-10-31 PROCEDURE — 99421 OL DIG E/M SVC 5-10 MIN: CPT | Mod: ,,, | Performed by: FAMILY MEDICINE

## 2023-10-31 PROCEDURE — 77385 HC IMRT, SIMPLE: CPT | Mod: HCNC | Performed by: RADIOLOGY

## 2023-10-31 PROCEDURE — 77014 PR  CT GUIDANCE PLACEMENT RAD THERAPY FIELDS: ICD-10-PCS | Mod: 26,HCNC,, | Performed by: RADIOLOGY

## 2023-10-31 RX ORDER — FLUTICASONE PROPIONATE 50 MCG
1 SPRAY, SUSPENSION (ML) NASAL DAILY
Qty: 16 G | Refills: 0 | Status: SHIPPED | OUTPATIENT
Start: 2023-10-31

## 2023-10-31 RX ORDER — PROMETHAZINE HYDROCHLORIDE AND DEXTROMETHORPHAN HYDROBROMIDE 6.25; 15 MG/5ML; MG/5ML
SYRUP ORAL
Qty: 240 ML | Refills: 0 | Status: SHIPPED | OUTPATIENT
Start: 2023-10-31 | End: 2023-12-15 | Stop reason: ALTCHOICE

## 2023-10-31 RX ORDER — BENZONATATE 200 MG/1
200 CAPSULE ORAL 3 TIMES DAILY PRN
Qty: 30 CAPSULE | Refills: 1 | Status: SHIPPED | OUTPATIENT
Start: 2023-10-31 | End: 2023-12-14

## 2023-10-31 NOTE — PROGRESS NOTES
Patient ID: Anthony Joseph Rockweiler is a 68 y.o. male.    Chief Complaint: Sinus Problem    The patient initiated a request through [employer] on 10/31/2023 for evaluation and management with a chief complaint of Sinus Problem     I evaluated the questionnaire submission on 10/31/23.    [facility] Peq Evisit Upper Respitatory/Cough Questionnaire    10/31/2023  3:14 PM CDT - Filed by Patient   Do you agree to participate in an E-Visit? Yes   If you have any of the following symptoms, please present to your local ER or call 911:  I acknowledge   What is the main issue that you would like for your doctor to address today? My  sinuses  and headache.  Could i get a z.pack.  when weather changes I have trouble . Thnak you   Are you able to take your vital signs? Yes   Systolic Blood Pressure: 128   Diastolic Blood Pressure: 85   Weight: 172   Height: 69   Pulse: 70   Temperature: 98.7   Respiration rate:    Pulse Oxygen:    What symptoms do you currently have?  Cough;  Fatigue;  Headache;  Nasal Congestion;  Runny nose   Describe your cough: Productive (containing mucus);  Bothersome (interferes with daily activities)   Describe the mucus: Clear   Have you ever smoked? I have never smoked   Have you had a fever? No   When did your symptoms first appear? 10/29/2023   In the last two weeks, have you been in close contact with someone who has COVID-19 or the Flu? No   In the last two weeks, have you worked or volunteered in a healthcare facility or as a ? Healthcare facilities include a hospital, medical or dental clinic, long-term care facility, or nursing home No   Do you live in a long-term care facility, nursing home, group home, or homeless shelter? No   List what you have done or taken to help your symptoms. Benadryl   How severe are your symptoms? Moderate   Have your symptoms improved since they first appeared? Worse   Have you taken an at home Covid test? Yes   What were the results? Negative   Have you taken  a Flu test? No   Have you been fully vaccinated for COVID? (2 Pfizer, 2 Moderna or 1 [employer] vaccine injections) Yes   Have you received a booster? Yes   Have you recieved a Flu shot? No   Do you have transportation to get tested for COVID if it is indicated and ordered for you at an [facility] location? No   Provide any information you feel is important to your history not asked above My sinuses were reconstructed years ago and at times when the weather changes I have trouble   Please attach any relevant images or files          Recent Labs Obtained:  No visits with results within 7 Day(s) from this visit.   Latest known visit with results is:   Lab Visit on 08/08/2023   Component Date Value Ref Range Status    WBC 08/08/2023 5.42  3.90 - 12.70 K/uL Final    RBC 08/08/2023 4.82  4.60 - 6.20 M/uL Final    Hemoglobin 08/08/2023 15.4  14.0 - 18.0 g/dL Final    Hematocrit 08/08/2023 45.9  40.0 - 54.0 % Final    MCV 08/08/2023 95  82 - 98 fL Final    MCH 08/08/2023 32.0 (H)  27.0 - 31.0 pg Final    MCHC 08/08/2023 33.6  32.0 - 36.0 g/dL Final    RDW 08/08/2023 12.7  11.5 - 14.5 % Final    Platelets 08/08/2023 229  150 - 450 K/uL Final    MPV 08/08/2023 10.0  9.2 - 12.9 fL Final    Immature Granulocytes 08/08/2023 0.6 (H)  0.0 - 0.5 % Final    Gran # (ANC) 08/08/2023 2.6  1.8 - 7.7 K/uL Final    Immature Grans (Abs) 08/08/2023 0.03  0.00 - 0.04 K/uL Final    Comment: Mild elevation in immature granulocytes is non specific and   can be seen in a variety of conditions including stress response,   acute inflammation, trauma and pregnancy. Correlation with other   laboratory and clinical findings is essential.      Lymph # 08/08/2023 1.8  1.0 - 4.8 K/uL Final    Mono # 08/08/2023 0.7  0.3 - 1.0 K/uL Final    Eos # 08/08/2023 0.2  0.0 - 0.5 K/uL Final    Baso # 08/08/2023 0.06  0.00 - 0.20 K/uL Final    nRBC 08/08/2023 0  0 /100 WBC Final    Gran % 08/08/2023 47.9  38.0 - 73.0 % Final    Lymph % 08/08/2023 33.6  18.0 -  48.0 % Final    Mono % 08/08/2023 13.5  4.0 - 15.0 % Final    Eosinophil % 08/08/2023 3.3  0.0 - 8.0 % Final    Basophil % 08/08/2023 1.1  0.0 - 1.9 % Final    Differential Method 08/08/2023 Automated   Final    TSH 08/08/2023 0.062 (L)  0.400 - 4.000 uIU/mL Final    T3, Total 08/08/2023 88  60 - 180 ng/dL Final    Free T4 08/08/2023 1.40  0.71 - 1.51 ng/dL Final    Cholesterol 08/08/2023 153  120 - 199 mg/dL Final    Comment: The National Cholesterol Education Program (NCEP) has set the  following guidelines (reference ranges) for Cholesterol:  Optimal.....................<200 mg/dL  Borderline High.............200-239 mg/dL  High........................> or = 240 mg/dL      Triglycerides 08/08/2023 153 (H)  30 - 150 mg/dL Final    Comment: The National Cholesterol Education Program (NCEP) has set the  following guidelines (reference values) for triglycerides:  Normal......................<150 mg/dL  Borderline High.............150-199 mg/dL  High........................200-499 mg/dL      HDL 08/08/2023 39 (L)  40 - 75 mg/dL Final    Comment: The National Cholesterol Education Program (NCEP) has set the  following guidelines (reference values) for HDL Cholesterol:  Low...............<40 mg/dL  Optimal...........>60 mg/dL      LDL Cholesterol 08/08/2023 83.4  63.0 - 159.0 mg/dL Final    Comment: The National Cholesterol Education Program (NCEP) has set the  following guidelines (reference values) for LDL Cholesterol:  Optimal.......................<130 mg/dL  Borderline High...............130-159 mg/dL  High..........................160-189 mg/dL  Very High.....................>190 mg/dL      HDL/Cholesterol Ratio 08/08/2023 25.5  20.0 - 50.0 % Final    Total Cholesterol/HDL Ratio 08/08/2023 3.9  2.0 - 5.0 Final    Non-HDL Cholesterol 08/08/2023 114  mg/dL Final    Comment: Risk category and Non-HDL cholesterol goals:  Coronary heart disease (CHD)or equivalent (10-year risk of CHD >20%):  Non-HDL cholesterol goal      <130 mg/dL  Two or more CHD risk factors and 10-year risk of CHD <= 20%:  Non-HDL cholesterol goal     <160 mg/dL  0 to 1 CHD risk factor:  Non-HDL cholesterol goal     <190 mg/dL      Hemoglobin A1C 2023 4.9  4.0 - 5.6 % Final    Comment: ADA Screening Guidelines:  5.7-6.4%  Consistent with prediabetes  >or=6.5%  Consistent with diabetes    High levels of fetal hemoglobin interfere with the HbA1C  assay. Heterozygous hemoglobin variants (HbS, HgC, etc)do  not significantly interfere with this assay.   However, presence of multiple variants may affect accuracy.      Estimated Avg Glucose 2023 94  68 - 131 mg/dL Final    PSA Diagnostic 2023 23.9 (H)  0.00 - 4.00 ng/mL Final    Comment: The testing method is a chemiluminescent microparticle immunoassay   manufactured by [employer] and performed on the    or   [employer] system. Values obtained with different assay manufacturers   for   methods may be different and cannot be used interchangeably.  PSA Expected levels:  Hormonal Therapy: <0.05 ng/ml  Prostatectomy: <0.01 ng/ml  Radiation Therapy: <1.00 ng/ml         Encounter Diagnosis   Name Primary?    Viral upper respiratory tract infection Yes        No orders of the defined types were placed in this encounter.     Medications Ordered This Encounter   Medications    benzonatate (TESSALON) 200 MG capsule     Sig: Take 1 capsule (200 mg total) by mouth 3 (three) times daily as needed for Cough.     Dispense:  30 capsule     Refill:  1    fluticasone propionate (FLONASE) 50 mcg/actuation nasal spray     Si spray (50 mcg total) by Each Nostril route once daily.     Dispense:  16 g     Refill:  0    promethazine-dextromethorphan (PROMETHAZINE-DM) 6.25-15 mg/5 mL Syrp     Sig: Take 10-15ml by mouth every 8 hours as needed for coughing     Dispense:  240 mL     Refill:  0      See [facility] message for POC.    Follow up if symptoms worsen or fail to improve.      E-Visit Time  Tracking:    Day 1 Time (in minutes): 5     Total Time (in minutes): 5

## 2023-10-31 NOTE — TELEPHONE ENCOUNTER
----- Message from Lisa Leigh sent at 10/31/2023  1:07 PM CDT -----  Type: Patient Call Back    Who called: self     What is the request in detail: patient is requesting a script for a Z Pack due to his sinus. Please call    Can the clinic reply by [facility]? no    Would the patient rather a call back or a response via [facility]?  call    Best call back number: .[phone] (home)      Additional Information:     
Spoke to patient in reference to his sinus issues. Requesting a z pack. Patient was sent an Evisit.  
Carried

## 2023-10-31 NOTE — TELEPHONE ENCOUNTER
----- Message from Ross Loo sent at 10/31/2023  2:36 PM CDT -----  Type:  Patient Returning Call    Who Called: Self    Who Left Message for Patient: Adam Bethea,    Does the patient know what this is regarding?:Yes    Would the patient rather a call back or a response via [facility]? Call    Best Call Back Number:[phone]    Additional Information: [place]      
[de-identified] : Mr. GRUBER  is s/p left inguinal hernia repair with mesh on 11/22/2022. Today  Mr. GRUBER offers no complaints. patient reports no fever or  chills. patient reports occasional discomfort in the surgical area.  His surgical incisions are healing well. No signs of inflammation, infection or exudate. patient reports good bowel movements and appetite.

## 2023-11-01 ENCOUNTER — APPOINTMENT (OUTPATIENT)
Dept: RADIATION THERAPY | Facility: OTHER | Age: 68
End: 2023-11-01
Attending: RADIOLOGY
Payer: MEDICARE

## 2023-11-01 PROCEDURE — 77385 HC IMRT, SIMPLE: CPT | Mod: HCNC | Performed by: RADIOLOGY

## 2023-11-01 PROCEDURE — 77336 RADIATION PHYSICS CONSULT: CPT | Mod: HCNC | Performed by: RADIOLOGY

## 2023-11-01 PROCEDURE — 77014 PR  CT GUIDANCE PLACEMENT RAD THERAPY FIELDS: ICD-10-PCS | Mod: 26,HCNC,, | Performed by: RADIOLOGY

## 2023-11-01 PROCEDURE — 77427 RADIATION TX MANAGEMENT X5: CPT | Mod: HCNC,,, | Performed by: RADIOLOGY

## 2023-11-01 PROCEDURE — 77014 PR  CT GUIDANCE PLACEMENT RAD THERAPY FIELDS: CPT | Mod: 26,HCNC,, | Performed by: RADIOLOGY

## 2023-11-01 PROCEDURE — 77427 PR CHG RADIATION,MANGEMENT,5 TX'S: ICD-10-PCS | Mod: HCNC,,, | Performed by: RADIOLOGY

## 2023-11-02 PROCEDURE — 77014 PR  CT GUIDANCE PLACEMENT RAD THERAPY FIELDS: CPT | Mod: 26,HCNC,, | Performed by: RADIOLOGY

## 2023-11-02 PROCEDURE — 77014 PR  CT GUIDANCE PLACEMENT RAD THERAPY FIELDS: ICD-10-PCS | Mod: 26,HCNC,, | Performed by: RADIOLOGY

## 2023-11-02 PROCEDURE — 77385 HC IMRT, SIMPLE: CPT | Mod: HCNC | Performed by: RADIOLOGY

## 2023-11-03 PROCEDURE — 77014 PR  CT GUIDANCE PLACEMENT RAD THERAPY FIELDS: ICD-10-PCS | Mod: 26,HCNC,, | Performed by: RADIOLOGY

## 2023-11-03 PROCEDURE — 77385 HC IMRT, SIMPLE: CPT | Mod: HCNC | Performed by: RADIOLOGY

## 2023-11-03 PROCEDURE — 77014 PR  CT GUIDANCE PLACEMENT RAD THERAPY FIELDS: CPT | Mod: 26,HCNC,, | Performed by: RADIOLOGY

## 2023-11-06 ENCOUNTER — DOCUMENTATION ONLY (OUTPATIENT)
Dept: RADIATION ONCOLOGY | Facility: CLINIC | Age: 68
End: 2023-11-06
Payer: MEDICARE

## 2023-11-06 PROCEDURE — 77014 PR  CT GUIDANCE PLACEMENT RAD THERAPY FIELDS: CPT | Mod: 26,HCNC,, | Performed by: RADIOLOGY

## 2023-11-06 PROCEDURE — 77014 PR  CT GUIDANCE PLACEMENT RAD THERAPY FIELDS: ICD-10-PCS | Mod: 26,HCNC,, | Performed by: RADIOLOGY

## 2023-11-06 PROCEDURE — 77385 HC IMRT, SIMPLE: CPT | Mod: HCNC | Performed by: RADIOLOGY

## 2023-11-07 PROCEDURE — 77014 PR  CT GUIDANCE PLACEMENT RAD THERAPY FIELDS: ICD-10-PCS | Mod: 26,HCNC,, | Performed by: RADIOLOGY

## 2023-11-07 PROCEDURE — 77385 HC IMRT, SIMPLE: CPT | Mod: HCNC | Performed by: RADIOLOGY

## 2023-11-07 PROCEDURE — 77336 RADIATION PHYSICS CONSULT: CPT | Mod: HCNC | Performed by: RADIOLOGY

## 2023-11-07 PROCEDURE — 77014 PR  CT GUIDANCE PLACEMENT RAD THERAPY FIELDS: CPT | Mod: 26,HCNC,, | Performed by: RADIOLOGY

## 2023-11-08 PROCEDURE — 77014 PR  CT GUIDANCE PLACEMENT RAD THERAPY FIELDS: CPT | Mod: 26,HCNC,, | Performed by: RADIOLOGY

## 2023-11-08 PROCEDURE — 77427 RADIATION TX MANAGEMENT X5: CPT | Mod: HCNC,,, | Performed by: RADIOLOGY

## 2023-11-08 PROCEDURE — 77014 PR  CT GUIDANCE PLACEMENT RAD THERAPY FIELDS: ICD-10-PCS | Mod: 26,HCNC,, | Performed by: RADIOLOGY

## 2023-11-08 PROCEDURE — 77427 PR CHG RADIATION,MANGEMENT,5 TX'S: ICD-10-PCS | Mod: HCNC,,, | Performed by: RADIOLOGY

## 2023-11-08 PROCEDURE — 77385 HC IMRT, SIMPLE: CPT | Mod: HCNC | Performed by: RADIOLOGY

## 2023-11-09 DIAGNOSIS — C61 PROSTATE CANCER: Primary | ICD-10-CM

## 2023-11-09 PROCEDURE — 77014 PR  CT GUIDANCE PLACEMENT RAD THERAPY FIELDS: CPT | Mod: 26,HCNC,, | Performed by: RADIOLOGY

## 2023-11-09 PROCEDURE — 77014 PR  CT GUIDANCE PLACEMENT RAD THERAPY FIELDS: ICD-10-PCS | Mod: 26,HCNC,, | Performed by: RADIOLOGY

## 2023-11-09 PROCEDURE — 77385 HC IMRT, SIMPLE: CPT | Mod: HCNC | Performed by: RADIOLOGY

## 2023-11-10 PROCEDURE — 77385 HC IMRT, SIMPLE: CPT | Mod: HCNC | Performed by: RADIOLOGY

## 2023-11-10 PROCEDURE — 77014 PR  CT GUIDANCE PLACEMENT RAD THERAPY FIELDS: ICD-10-PCS | Mod: 26,HCNC,, | Performed by: RADIOLOGY

## 2023-11-10 PROCEDURE — 77014 PR  CT GUIDANCE PLACEMENT RAD THERAPY FIELDS: CPT | Mod: 26,HCNC,, | Performed by: RADIOLOGY

## 2023-11-13 ENCOUNTER — DOCUMENTATION ONLY (OUTPATIENT)
Dept: RADIATION ONCOLOGY | Facility: CLINIC | Age: 68
End: 2023-11-13
Payer: MEDICARE

## 2023-11-13 PROCEDURE — 77385 HC IMRT, SIMPLE: CPT | Mod: HCNC | Performed by: RADIOLOGY

## 2023-11-13 PROCEDURE — 77014 PR  CT GUIDANCE PLACEMENT RAD THERAPY FIELDS: CPT | Mod: 26,HCNC,, | Performed by: RADIOLOGY

## 2023-11-13 PROCEDURE — 77014 PR  CT GUIDANCE PLACEMENT RAD THERAPY FIELDS: ICD-10-PCS | Mod: 26,HCNC,, | Performed by: RADIOLOGY

## 2023-11-14 ENCOUNTER — TELEPHONE (OUTPATIENT)
Dept: UROLOGY | Facility: CLINIC | Age: 68
End: 2023-11-14
Payer: MEDICARE

## 2023-11-14 PROCEDURE — 77336 RADIATION PHYSICS CONSULT: CPT | Mod: HCNC | Performed by: RADIOLOGY

## 2023-11-14 PROCEDURE — 77014 PR  CT GUIDANCE PLACEMENT RAD THERAPY FIELDS: CPT | Mod: 26,HCNC,, | Performed by: RADIOLOGY

## 2023-11-14 PROCEDURE — 77014 PR  CT GUIDANCE PLACEMENT RAD THERAPY FIELDS: ICD-10-PCS | Mod: 26,HCNC,, | Performed by: RADIOLOGY

## 2023-11-14 PROCEDURE — 77385 HC IMRT, SIMPLE: CPT | Mod: HCNC | Performed by: RADIOLOGY

## 2023-11-14 NOTE — TELEPHONE ENCOUNTER
----- Message from Citlaly Flores RN sent at 11/14/2023  8:18 AM CST -----  Regarding: FW: Seed implant  Please place this patient at 8:00 a.m. on December 7th for Dr. Guzman.  This will move 1 other patient to later for that day.  Thank so much, Citlaly  ----- Message -----  From: Cecy Bosch LPN  Sent: 11/14/2023   8:16 AM CST  To: Citlaly Flores RN  Subject: RE: Seed implant                                 No, we cannot do the procedure with Dr. Connolly on the 4th, because we already have another patient scheduled on the 4th.     Please put the patient on with Dr. Guzman, 12/7 at 8:00.     Thanks,   Cecy   ----- Message -----  From: Citlaly Flores RN  Sent: 11/13/2023   5:11 PM CST  To: Nica Del Angel RN; Cecy Bosch LPN; #  Subject: RE: Seed implant                                 I am sorry, there is some confusion here.  It seems the patient is already scheduled on 12/04/2023 with Dr. Connolly.  Thank you, Citlaly  ----- Message -----  From: Deon Guzman MD  Sent: 11/13/2023   4:15 PM CST  To: Cecy Bosch LPN; Citlaly Flores RN  Subject: RE: Seed implant                                 I would have the seeds go at 8am and the other case can follow.  I can't accurately predict how long that other case is going to take.  Seeds should be much more predictable.     MM    ----- Message -----  From: Citlaly Flores RN  Sent: 11/13/2023   4:07 PM CST  To: Cecy Bosch LPN; Deon Guzman MD  Subject: RE: Seed implant                                 Srinath Chavarria,    This patient will finish his radiation on November 27, 2023.  This is a boost seed implant.    Although there is room on December 7th, the prior 8:00 a.m. patient is the one that you said could be a long case.    Do you want this seed implant as well on December 7th or a different day?    Thank you, Citlaly    ----- Message -----  From: Cecy Bosch LPN  Sent: 11/13/2023   3:53 PM CST  To: Citlaly Flores RN  Subject: Seed  implant                                     Srinath Boucher, pt needs a seed implant with Dr. Guzman, can we get him on for Dec. 7th?    -Cecy

## 2023-11-15 ENCOUNTER — TELEPHONE (OUTPATIENT)
Dept: UROLOGY | Facility: CLINIC | Age: 68
End: 2023-11-15
Payer: MEDICARE

## 2023-11-15 PROCEDURE — 77427 RADIATION TX MANAGEMENT X5: CPT | Mod: HCNC,,, | Performed by: RADIOLOGY

## 2023-11-15 PROCEDURE — 77014 PR  CT GUIDANCE PLACEMENT RAD THERAPY FIELDS: CPT | Mod: 26,HCNC,, | Performed by: RADIOLOGY

## 2023-11-15 PROCEDURE — 77014 PR  CT GUIDANCE PLACEMENT RAD THERAPY FIELDS: ICD-10-PCS | Mod: 26,HCNC,, | Performed by: RADIOLOGY

## 2023-11-15 PROCEDURE — 77385 HC IMRT, SIMPLE: CPT | Mod: HCNC | Performed by: RADIOLOGY

## 2023-11-15 PROCEDURE — 77427 PR CHG RADIATION,MANGEMENT,5 TX'S: ICD-10-PCS | Mod: HCNC,,, | Performed by: RADIOLOGY

## 2023-11-15 NOTE — TELEPHONE ENCOUNTER
----- Message from Cecy Bosch LPN sent at 11/14/2023 12:32 PM CST -----  Regarding: RE: Seed implant  Thanks  ----- Message -----  From: Citlaly Flores RN  Sent: 11/14/2023   8:20 AM CST  To: Cecy Bosch LPN; Lynda Dunn; #  Subject: FW: Seed implant                                 Please place this patient at 8:00 a.m. on December 7th for Dr. Guzman.  This will move 1 other patient to later for that day.  Thank so much, Citlaly  ----- Message -----  From: Cecy Bosch LPN  Sent: 11/14/2023   8:16 AM CST  To: Citlaly Flores RN  Subject: RE: Seed implant                                 No, we cannot do the procedure with Dr. Connolly on the 4th, because we already have another patient scheduled on the 4th.     Please put the patient on with Dr. Guzman, 12/7 at 8:00.     Thanks,   Cecy   ----- Message -----  From: Citlaly Flores RN  Sent: 11/13/2023   5:11 PM CST  To: Nica Del Angel RN; Cecy Bosch LPN; #  Subject: RE: Seed implant                                 I am sorry, there is some confusion here.  It seems the patient is already scheduled on 12/04/2023 with Dr. Connolly.  Thank you, Citlaly  ----- Message -----  From: Deon Guzman MD  Sent: 11/13/2023   4:15 PM CST  To: Cecy Bosch LPN; Citlaly Flores RN  Subject: RE: Seed implant                                 I would have the seeds go at 8am and the other case can follow.  I can't accurately predict how long that other case is going to take.  Seeds should be much more predictable.     MM    ----- Message -----  From: Citlaly Flores RN  Sent: 11/13/2023   4:07 PM CST  To: Cecy Bosch LPN; Deon Guzman MD  Subject: RE: Seed implant                                 Srinath Chavarria,    This patient will finish his radiation on November 27, 2023.  This is a boost seed implant.    Although there is room on December 7th, the prior 8:00 a.m. patient is the one that you said could be a long case.    Do you want this seed  implant as well on December 7th or a different day?    Thank you, Citlaly    ----- Message -----  From: Cecy Bosch LPN  Sent: 11/13/2023   3:53 PM CST  To: Citlaly Flores RN  Subject: Seed implant                                     Hi Citlaly pt needs a seed implant with Dr. Guzman, can we get him on for Dec. 7th?    -Cecy

## 2023-11-16 ENCOUNTER — TELEPHONE (OUTPATIENT)
Dept: UROLOGY | Facility: CLINIC | Age: 68
End: 2023-11-16
Payer: MEDICARE

## 2023-11-16 PROCEDURE — 77014 PR  CT GUIDANCE PLACEMENT RAD THERAPY FIELDS: CPT | Mod: 26,HCNC,, | Performed by: RADIOLOGY

## 2023-11-16 PROCEDURE — 77385 HC IMRT, SIMPLE: CPT | Mod: HCNC | Performed by: RADIOLOGY

## 2023-11-16 PROCEDURE — 77014 PR  CT GUIDANCE PLACEMENT RAD THERAPY FIELDS: ICD-10-PCS | Mod: 26,HCNC,, | Performed by: RADIOLOGY

## 2023-11-16 NOTE — TELEPHONE ENCOUNTER
----- Message from Cecy Bosch LPN sent at 11/15/2023  4:14 PM CST -----  Regarding: RE: Seed implant  Hi June,     I was checking pt's appointment for his seed implant and noticed he is still under Dr. Connolly for the procedure. Can you please change the patient's provider from Mohsen to Thomas on Thursday, Dec. 7th at 8:00 AM.     Thanks,   Cecy   ----- Message -----  From: Jocelyn Jimenez MA  Sent: 11/15/2023   9:29 AM CST  To: Cecy Bosch LPN; Lynda Dunn; #  Subject: RE: Seed implant                                 Patient has been moved to 8am  ----- Message -----  From: Citlaly Flores RN  Sent: 11/14/2023   8:20 AM CST  To: Cecy Bosch LPN; Lynda Dunn; #  Subject: FW: Seed implant                                 Please place this patient at 8:00 a.m. on December 7th for Dr. Guzman.  This will move 1 other patient to later for that day.  Thank so much, Citlaly  ----- Message -----  From: Cecy Bosch LPN  Sent: 11/14/2023   8:16 AM CST  To: Citlaly Flores RN  Subject: RE: Seed implant                                 No, we cannot do the procedure with Dr. Connolly on the 4th, because we already have another patient scheduled on the 4th.     Please put the patient on with Dr. Guzman, 12/7 at 8:00.     Thanks,   Cecy   ----- Message -----  From: Citlaly Flores RN  Sent: 11/13/2023   5:11 PM CST  To: Nica Del Angel RN; Cecy Bosch LPN; #  Subject: RE: Seed implant                                 I am sorry, there is some confusion here.  It seems the patient is already scheduled on 12/04/2023 with Dr. Connolly.  Thank you, Citlaly  ----- Message -----  From: Deon Guzman MD  Sent: 11/13/2023   4:15 PM CST  To: Cecy Bosch LPN; Citlaly Flores RN  Subject: RE: Seed implant                                 I would have the seeds go at 8am and the other case can follow.  I can't accurately predict how long that other case is going to take.  Seeds should be much more  predictable.     MM    ----- Message -----  From: Citlaly Flores RN  Sent: 11/13/2023   4:07 PM CST  To: Cecy Bosch LPN; Deon Guzman MD  Subject: RE: Seed implant                                 Srinath Chavarria,    This patient will finish his radiation on November 27, 2023.  This is a boost seed implant.    Although there is room on December 7th, the prior 8:00 a.m. patient is the one that you said could be a long case.    Do you want this seed implant as well on December 7th or a different day?    Thank you, Citlaly    ----- Message -----  From: Cecy Bosch LPN  Sent: 11/13/2023   3:53 PM CST  To: Citlaly Flores RN  Subject: Seed implant                                     Srinath Boucher, pt needs a seed implant with Dr. Guzman, can we get him on for Dec. 7th?    -Cecy

## 2023-11-17 PROCEDURE — 77385 HC IMRT, SIMPLE: CPT | Mod: HCNC | Performed by: RADIOLOGY

## 2023-11-17 PROCEDURE — 77014 PR  CT GUIDANCE PLACEMENT RAD THERAPY FIELDS: ICD-10-PCS | Mod: 26,HCNC,, | Performed by: RADIOLOGY

## 2023-11-17 PROCEDURE — 77014 PR  CT GUIDANCE PLACEMENT RAD THERAPY FIELDS: CPT | Mod: 26,HCNC,, | Performed by: RADIOLOGY

## 2023-11-19 PROCEDURE — 77014 PR  CT GUIDANCE PLACEMENT RAD THERAPY FIELDS: ICD-10-PCS | Mod: 26,HCNC,, | Performed by: RADIOLOGY

## 2023-11-19 PROCEDURE — 77385 HC IMRT, SIMPLE: CPT | Mod: HCNC | Performed by: RADIOLOGY

## 2023-11-19 PROCEDURE — 77014 PR  CT GUIDANCE PLACEMENT RAD THERAPY FIELDS: CPT | Mod: 26,HCNC,, | Performed by: RADIOLOGY

## 2023-11-20 ENCOUNTER — DOCUMENTATION ONLY (OUTPATIENT)
Dept: RADIATION ONCOLOGY | Facility: CLINIC | Age: 68
End: 2023-11-20
Payer: MEDICARE

## 2023-11-20 PROCEDURE — 77385 HC IMRT, SIMPLE: CPT | Mod: HCNC | Performed by: RADIOLOGY

## 2023-11-20 PROCEDURE — 77014 PR  CT GUIDANCE PLACEMENT RAD THERAPY FIELDS: ICD-10-PCS | Mod: 26,HCNC,, | Performed by: RADIOLOGY

## 2023-11-20 PROCEDURE — 77014 PR  CT GUIDANCE PLACEMENT RAD THERAPY FIELDS: CPT | Mod: 26,HCNC,, | Performed by: RADIOLOGY

## 2023-11-20 PROCEDURE — 77336 RADIATION PHYSICS CONSULT: CPT | Mod: HCNC | Performed by: RADIOLOGY

## 2023-11-21 PROCEDURE — 77014 PR  CT GUIDANCE PLACEMENT RAD THERAPY FIELDS: CPT | Mod: 26,HCNC,, | Performed by: RADIOLOGY

## 2023-11-21 PROCEDURE — 77385 HC IMRT, SIMPLE: CPT | Mod: HCNC | Performed by: RADIOLOGY

## 2023-11-21 PROCEDURE — 77427 PR CHG RADIATION,MANGEMENT,5 TX'S: ICD-10-PCS | Mod: HCNC,,, | Performed by: RADIOLOGY

## 2023-11-21 PROCEDURE — 77014 PR  CT GUIDANCE PLACEMENT RAD THERAPY FIELDS: ICD-10-PCS | Mod: 26,HCNC,, | Performed by: RADIOLOGY

## 2023-11-21 PROCEDURE — 77427 RADIATION TX MANAGEMENT X5: CPT | Mod: HCNC,,, | Performed by: RADIOLOGY

## 2023-11-22 PROCEDURE — 77385 HC IMRT, SIMPLE: CPT | Mod: HCNC | Performed by: RADIOLOGY

## 2023-11-22 PROCEDURE — 77014 PR  CT GUIDANCE PLACEMENT RAD THERAPY FIELDS: ICD-10-PCS | Mod: 26,HCNC,, | Performed by: RADIOLOGY

## 2023-11-22 PROCEDURE — 77014 PR  CT GUIDANCE PLACEMENT RAD THERAPY FIELDS: CPT | Mod: 26,HCNC,, | Performed by: RADIOLOGY

## 2023-11-27 ENCOUNTER — DOCUMENTATION ONLY (OUTPATIENT)
Dept: RADIATION ONCOLOGY | Facility: CLINIC | Age: 68
End: 2023-11-27
Payer: MEDICARE

## 2023-11-27 DIAGNOSIS — C61 PROSTATE CANCER: Primary | ICD-10-CM

## 2023-11-27 PROCEDURE — 77014 PR  CT GUIDANCE PLACEMENT RAD THERAPY FIELDS: ICD-10-PCS | Mod: 26,HCNC,, | Performed by: RADIOLOGY

## 2023-11-27 PROCEDURE — 77014 PR  CT GUIDANCE PLACEMENT RAD THERAPY FIELDS: CPT | Mod: 26,HCNC,, | Performed by: RADIOLOGY

## 2023-11-27 PROCEDURE — 77385 HC IMRT, SIMPLE: CPT | Mod: HCNC | Performed by: RADIOLOGY

## 2023-11-27 RX ORDER — TAMSULOSIN HYDROCHLORIDE 0.4 MG/1
0.4 CAPSULE ORAL NIGHTLY
Qty: 30 CAPSULE | Refills: 11 | Status: SHIPPED | OUTPATIENT
Start: 2023-11-27 | End: 2024-01-10

## 2023-11-29 NOTE — PLAN OF CARE
Completed 23/23 of outpatient radiation to the prostate, today. Overall tolerated therapy well. No dysuria or hematuria; some urgency. No diarrhea. Seed implant boost scheduled for Thursday, Dec. 7th.

## 2023-12-01 ENCOUNTER — APPOINTMENT (OUTPATIENT)
Dept: RADIATION THERAPY | Facility: OTHER | Age: 68
End: 2023-12-01
Attending: RADIOLOGY
Payer: MEDICARE

## 2023-12-01 PROCEDURE — 77295 3-D RADIOTHERAPY PLAN: CPT | Mod: 26,HCNC,, | Performed by: RADIOLOGY

## 2023-12-01 PROCEDURE — 77336 RADIATION PHYSICS CONSULT: CPT | Mod: HCNC,59 | Performed by: RADIOLOGY

## 2023-12-01 PROCEDURE — 77295 3-D RADIOTHERAPY PLAN: CPT | Mod: TC,HCNC | Performed by: RADIOLOGY

## 2023-12-05 ENCOUNTER — PATIENT MESSAGE (OUTPATIENT)
Dept: UROLOGY | Facility: CLINIC | Age: 68
End: 2023-12-05
Payer: MEDICARE

## 2023-12-05 ENCOUNTER — TELEPHONE (OUTPATIENT)
Dept: UROLOGY | Facility: CLINIC | Age: 68
End: 2023-12-05
Payer: MEDICARE

## 2023-12-05 NOTE — TELEPHONE ENCOUNTER
IBD PATIENT INTAKE:    COVID symptoms in the last 7 days (runny nose, sore throat, congestion, cough, fever): No  PCP: Luis Alberto Harris  If not PCP-  number given to establish [phone]: N/A    ALLERGIES REVIEWED:  Yes    CHIEF COMPLAINT:    Chief Complaint   Patient presents with    Crohn's Disease       VITAL SIGNS:  BP (!) 143/88 (BP Location: Left arm, Patient Position: Sitting)   Pulse (!) 111   Temp 98.8 °F (37.1 °C)   Wt (!) 192.4 kg (424 lb 2.6 oz)   LMP 04/20/2023   SpO2 96%   BMI 64.49 kg/m²      Change in medical, surgical, family or social history: No    IBD THERAPY (name, dose/frequency):  Remicade 10mg/kg q6w   Last dose:  5/4    Next dose:  6/15  Infusion/Pharmacy: [facility] [place] [facility]    NSAIDs (aspirin, ibuprofen-advil or motrin, naproxen-aleve, diclofenac-voltaren, BC powder, excedrin, goodies): No    Alternative/Complementary Medications (i.e. probiotics, turmeric, fish oil, aloe vera):      No  Name/dose:  n/a    Vitamins:   Vit D:  5000U     Vit B-12:  1000mcg   Folic Acid: no       Calcium: 600mg     Iron:  no      MVI: no    Antibiotics (past 30 Days):  No  If yes   Indication:  Name of antibiotic:  Completion date:     REVIEWED MEDICATION LIST RECONCILED INCLUDING ABOVE MEDS:  Yes                  Answers submitted by the patient for this visit:  Established Patient Questionnaire  (Submitted on 5/2/2023)  nausea: Yes     You are scheduled for surgery with  on 2023. Your arrival time is 0930 AM. You are to report to the [facility] located in the back of the [facility] on the [place] side of the building right behind [facility]. You will need someone to drive you home following your surgery.  No alcohol 24 hours before and after and no smoking a few days prior. NOTHING TO EAT OR DRINK AFTER MIDNIGHT THE NIGHT PRIOR TO THE SURGERY INCLUDING GUM, CANDY AND MINTS. Take a shower the night before and the morning of with Hibiclens Antibacterial soap and no lotion, cologne, deodorant or powder in the morning.      ----- Message from Cecy Bosch LPN sent at 2023  1:50 PM CST -----  Regarding: FW: call back  Srinath Boucher Pt would like a call with instructions for his seed implant ()    Cecy Wong   ----- Message -----  From: Savanna Lynn, Patient Care Assistant  Sent: 2023   1:17 PM CST  To: Cecy Bosch LPN  Subject: call back                                        PT stated they are trying to figure out why they was not call about there procedure details.  Dr. Vuong    1955

## 2023-12-05 NOTE — TELEPHONE ENCOUNTER
----- Message from Cecy Bosch LPN sent at 2023  1:50 PM CST -----  Regarding: FW: call back  Srinath Boucher Pt would like a call with instructions for his seed implant ()    Cecy Wong   ----- Message -----  From: Savanna Lynn, Patient Care Assistant  Sent: 2023   1:17 PM CST  To: Cecy Bosch LPN  Subject: call back                                        PT stated they are trying to figure out why they was not call about there procedure details.  Dr. Vuong    1955

## 2023-12-05 NOTE — PRE-PROCEDURE INSTRUCTIONS
PREOP INSTRUCTIONS:  No food,milk or milk products for 8 hours before surgery.  Clear liquids like water,gatorade,apple juice are allowed up until 2 hours before surgery.  Instructed to follow the surgeon's instructions if they differ from these.  Shower instructions as well as directions to the Surgery Center were given.  Encouraged to wear loose fitting,comfortable clothing.  Medication instructions for pm prior to and am of procedure reviewed.  Instructed to avoid taking vitamins,supplements,aspirin and ibuprofen the morning of surgery.    Patient reported suffering a severe head injury in the past and has residual issues from it.Patient stated that he will try to hold off on the NSAIDS and Excedrin but his pain may get out of control    Patient denies any side effects or issues with anesthesia or sedation.     Patient does not know arrival time.Explained that this information comes from the surgeon's office and if they haven't heard from them by 2 or 3 pm 12/6/2023 to call the office.Patient stated an understanding.

## 2023-12-06 PROCEDURE — 77300 RADIATION THERAPY DOSE PLAN: CPT | Mod: TC,HCNC | Performed by: RADIOLOGY

## 2023-12-06 PROCEDURE — 77300 RADIATION THERAPY DOSE PLAN: CPT | Mod: 26,HCNC,, | Performed by: RADIOLOGY

## 2023-12-07 ENCOUNTER — ANESTHESIA (OUTPATIENT)
Dept: SURGERY | Facility: HOSPITAL | Age: 68
End: 2023-12-07
Payer: MEDICARE

## 2023-12-07 ENCOUNTER — HOSPITAL ENCOUNTER (OUTPATIENT)
Dept: RADIOLOGY | Facility: HOSPITAL | Age: 68
Discharge: HOME | End: 2023-12-07
Admitting: UROLOGY
Payer: MEDICARE

## 2023-12-07 ENCOUNTER — ANESTHESIA EVENT (OUTPATIENT)
Dept: SURGERY | Facility: HOSPITAL | Age: 68
End: 2023-12-07
Payer: MEDICARE

## 2023-12-07 ENCOUNTER — HOSPITAL ENCOUNTER (OUTPATIENT)
Facility: HOSPITAL | Age: 68
Discharge: HOME | End: 2023-12-07
Attending: UROLOGY | Admitting: UROLOGY
Payer: MEDICARE

## 2023-12-07 VITALS
TEMPERATURE: 100 F | RESPIRATION RATE: 20 BRPM | DIASTOLIC BLOOD PRESSURE: 82 MMHG | OXYGEN SATURATION: 95 % | HEIGHT: 68 IN | BODY MASS INDEX: 25.76 KG/M2 | WEIGHT: 170 LBS | HEART RATE: 80 BPM | SYSTOLIC BLOOD PRESSURE: 120 MMHG

## 2023-12-07 DIAGNOSIS — C61 PROSTATE CANCER: ICD-10-CM

## 2023-12-07 DIAGNOSIS — C61 PROSTATE CANCER: Primary | ICD-10-CM

## 2023-12-07 PROCEDURE — 25000003 PHARM REV CODE 250: Mod: HCNC | Performed by: UROLOGY

## 2023-12-07 PROCEDURE — 25000003 PHARM REV CODE 250: Mod: HCNC | Performed by: STUDENT IN AN ORGANIZED HEALTH CARE EDUCATION/TRAINING PROGRAM

## 2023-12-07 PROCEDURE — 77778 APPLY INTERSTIT RADIAT COMPL: CPT | Mod: TC,HCNC | Performed by: RADIOLOGY

## 2023-12-07 PROCEDURE — D9220A PRA ANESTHESIA: Mod: HCNC,ANES,, | Performed by: ANESTHESIOLOGY

## 2023-12-07 PROCEDURE — 55875 PR PERCUT/NEEDLE INSERT,PROSTATE,RADIOISOT: ICD-10-PCS | Mod: HCNC,,, | Performed by: UROLOGY

## 2023-12-07 PROCEDURE — C2639 BRACHYTX, NON-STRANDED,I-125: HCPCS | Mod: HCNC | Performed by: UROLOGY

## 2023-12-07 PROCEDURE — 71000015 HC POSTOP RECOV 1ST HR: Mod: HCNC | Performed by: UROLOGY

## 2023-12-07 PROCEDURE — D9220A PRA ANESTHESIA: ICD-10-PCS | Mod: HCNC,ANES,, | Performed by: ANESTHESIOLOGY

## 2023-12-07 PROCEDURE — D9220A PRA ANESTHESIA: ICD-10-PCS | Mod: HCNC,CRNA,, | Performed by: STUDENT IN AN ORGANIZED HEALTH CARE EDUCATION/TRAINING PROGRAM

## 2023-12-07 PROCEDURE — 77014 CT GUIDANCE RADIATION THERAPY FIELD: CPT | Mod: TC,HCNC

## 2023-12-07 PROCEDURE — 76965 CHG SONO GUIDE RADIOELEMT APPLIC: ICD-10-PCS | Mod: 26,HCNC,, | Performed by: UROLOGY

## 2023-12-07 PROCEDURE — 63600175 PHARM REV CODE 636 W HCPCS: Mod: HCNC | Performed by: NURSE ANESTHETIST, CERTIFIED REGISTERED

## 2023-12-07 PROCEDURE — 55875 TRANSPERI NEEDLE PLACE PROS: CPT | Mod: HCNC,,, | Performed by: UROLOGY

## 2023-12-07 PROCEDURE — 25000003 PHARM REV CODE 250: Mod: HCNC

## 2023-12-07 PROCEDURE — 77778 APPLY INTERSTIT RADIAT COMPL: CPT | Mod: 26,HCNC,, | Performed by: RADIOLOGY

## 2023-12-07 PROCEDURE — C2639 BRACHYTX, NON-STRANDED,I-125: HCPCS | Mod: HCNC | Performed by: RADIOLOGY

## 2023-12-07 PROCEDURE — 63600175 PHARM REV CODE 636 W HCPCS: Mod: HCNC | Performed by: STUDENT IN AN ORGANIZED HEALTH CARE EDUCATION/TRAINING PROGRAM

## 2023-12-07 PROCEDURE — 63600175 PHARM REV CODE 636 W HCPCS: Mod: HCNC

## 2023-12-07 PROCEDURE — 36000704 HC OR TIME LEV I 1ST 15 MIN: Mod: HCNC | Performed by: UROLOGY

## 2023-12-07 PROCEDURE — 76965 ECHO GUIDANCE RADIOTHERAPY: CPT | Mod: 26,HCNC,, | Performed by: UROLOGY

## 2023-12-07 PROCEDURE — 71000044 HC DOSC ROUTINE RECOVERY FIRST HOUR: Mod: HCNC | Performed by: UROLOGY

## 2023-12-07 PROCEDURE — 27200651 HC AIRWAY, LMA: Mod: HCNC | Performed by: ANESTHESIOLOGY

## 2023-12-07 PROCEDURE — D9220A PRA ANESTHESIA: Mod: HCNC,CRNA,, | Performed by: STUDENT IN AN ORGANIZED HEALTH CARE EDUCATION/TRAINING PROGRAM

## 2023-12-07 PROCEDURE — 37000009 HC ANESTHESIA EA ADD 15 MINS: Mod: HCNC | Performed by: UROLOGY

## 2023-12-07 PROCEDURE — 77370 RADIATION PHYSICS CONSULT: CPT | Mod: HCNC | Performed by: RADIOLOGY

## 2023-12-07 PROCEDURE — 77790 RADIATION HANDLING: CPT | Mod: TC,HCNC | Performed by: RADIOLOGY

## 2023-12-07 PROCEDURE — 77470 SPECIAL RADIATION TREATMENT: CPT | Mod: 26,59,HCNC, | Performed by: RADIOLOGY

## 2023-12-07 PROCEDURE — C2638 BRACHYTX, STRANDED, I-125: HCPCS | Mod: HCNC | Performed by: RADIOLOGY

## 2023-12-07 PROCEDURE — 37000008 HC ANESTHESIA 1ST 15 MINUTES: Mod: HCNC | Performed by: UROLOGY

## 2023-12-07 PROCEDURE — 36000705 HC OR TIME LEV I EA ADD 15 MIN: Mod: HCNC | Performed by: UROLOGY

## 2023-12-07 PROCEDURE — 77470 SPECIAL RADIATION TREATMENT: CPT | Mod: 59,TC,HCNC | Performed by: RADIOLOGY

## 2023-12-07 DEVICE — IMPLANTABLE DEVICE: Type: IMPLANTABLE DEVICE | Site: PERIANAL | Status: FUNCTIONAL

## 2023-12-07 RX ORDER — PROPOFOL 10 MG/ML
INJECTION, EMULSION INTRAVENOUS
Status: DISCONTINUED | OUTPATIENT
Start: 2023-12-07 | End: 2023-12-07

## 2023-12-07 RX ORDER — EPHEDRINE SULFATE 50 MG/ML
INJECTION, SOLUTION INTRAVENOUS
Status: DISCONTINUED | OUTPATIENT
Start: 2023-12-07 | End: 2023-12-07

## 2023-12-07 RX ORDER — LIDOCAINE HYDROCHLORIDE 20 MG/ML
INJECTION INTRAVENOUS
Status: DISCONTINUED | OUTPATIENT
Start: 2023-12-07 | End: 2023-12-07

## 2023-12-07 RX ORDER — IBUPROFEN 800 MG/1
800 TABLET ORAL EVERY 6 HOURS PRN
Qty: 10 TABLET | Refills: 0 | Status: SHIPPED | OUTPATIENT
Start: 2023-12-07 | End: 2023-12-18

## 2023-12-07 RX ORDER — METHYLPREDNISOLONE 4 MG/1
TABLET ORAL
Qty: 21 TABLET | Refills: 0 | Status: SHIPPED | OUTPATIENT
Start: 2023-12-07 | End: 2023-12-18

## 2023-12-07 RX ORDER — PHENAZOPYRIDINE HYDROCHLORIDE 100 MG/1
100 TABLET, FILM COATED ORAL
Status: DISCONTINUED | OUTPATIENT
Start: 2023-12-07 | End: 2023-12-07 | Stop reason: HOSPADM

## 2023-12-07 RX ORDER — HYDROCODONE BITARTRATE AND ACETAMINOPHEN 5; 325 MG/1; MG/1
1 TABLET ORAL EVERY 8 HOURS PRN
Qty: 6 TABLET | Refills: 0 | Status: SHIPPED | OUTPATIENT
Start: 2023-12-07 | End: 2024-02-26

## 2023-12-07 RX ORDER — ONDANSETRON 2 MG/ML
4 INJECTION INTRAMUSCULAR; INTRAVENOUS DAILY PRN
Status: DISCONTINUED | OUTPATIENT
Start: 2023-12-07 | End: 2023-12-07 | Stop reason: HOSPADM

## 2023-12-07 RX ORDER — SODIUM CHLORIDE 0.9 % (FLUSH) 0.9 %
3 SYRINGE (ML) INJECTION
Status: DISCONTINUED | OUTPATIENT
Start: 2023-12-07 | End: 2023-12-07 | Stop reason: HOSPADM

## 2023-12-07 RX ORDER — FENTANYL CITRATE 50 UG/ML
INJECTION, SOLUTION INTRAMUSCULAR; INTRAVENOUS
Status: DISCONTINUED | OUTPATIENT
Start: 2023-12-07 | End: 2023-12-07

## 2023-12-07 RX ORDER — LIDOCAINE HYDROCHLORIDE 20 MG/ML
JELLY TOPICAL
Status: DISCONTINUED | OUTPATIENT
Start: 2023-12-07 | End: 2023-12-07 | Stop reason: HOSPADM

## 2023-12-07 RX ORDER — PHENAZOPYRIDINE HYDROCHLORIDE 100 MG/1
100 TABLET, FILM COATED ORAL
Status: DISCONTINUED | OUTPATIENT
Start: 2023-12-07 | End: 2023-12-07

## 2023-12-07 RX ORDER — ONDANSETRON 2 MG/ML
INJECTION INTRAMUSCULAR; INTRAVENOUS
Status: DISCONTINUED | OUTPATIENT
Start: 2023-12-07 | End: 2023-12-07

## 2023-12-07 RX ORDER — HYDROMORPHONE HYDROCHLORIDE 1 MG/ML
0.2 INJECTION, SOLUTION INTRAMUSCULAR; INTRAVENOUS; SUBCUTANEOUS EVERY 5 MIN PRN
Status: DISCONTINUED | OUTPATIENT
Start: 2023-12-07 | End: 2023-12-07 | Stop reason: HOSPADM

## 2023-12-07 RX ORDER — DEXAMETHASONE SODIUM PHOSPHATE 4 MG/ML
INJECTION, SOLUTION INTRA-ARTICULAR; INTRALESIONAL; INTRAMUSCULAR; INTRAVENOUS; SOFT TISSUE
Status: DISCONTINUED | OUTPATIENT
Start: 2023-12-07 | End: 2023-12-07

## 2023-12-07 RX ORDER — HALOPERIDOL 5 MG/ML
0.5 INJECTION INTRAMUSCULAR EVERY 10 MIN PRN
Status: DISCONTINUED | OUTPATIENT
Start: 2023-12-07 | End: 2023-12-07 | Stop reason: HOSPADM

## 2023-12-07 RX ORDER — CIPROFLOXACIN 500 MG/1
500 TABLET ORAL EVERY 12 HOURS
Qty: 14 TABLET | Refills: 0 | Status: SHIPPED | OUTPATIENT
Start: 2023-12-07 | End: 2023-12-14

## 2023-12-07 RX ORDER — SODIUM CHLORIDE 9 MG/ML
INJECTION, SOLUTION INTRAVENOUS CONTINUOUS
Status: DISCONTINUED | OUTPATIENT
Start: 2023-12-07 | End: 2023-12-07 | Stop reason: HOSPADM

## 2023-12-07 RX ORDER — OXYCODONE AND ACETAMINOPHEN 5; 325 MG/1; MG/1
1 TABLET ORAL
Status: DISCONTINUED | OUTPATIENT
Start: 2023-12-07 | End: 2023-12-07 | Stop reason: HOSPADM

## 2023-12-07 RX ORDER — TAMSULOSIN HYDROCHLORIDE 0.4 MG/1
0.4 CAPSULE ORAL NIGHTLY
Qty: 30 CAPSULE | Refills: 0 | Status: SHIPPED | OUTPATIENT
Start: 2023-12-07 | End: 2023-12-18

## 2023-12-07 RX ORDER — SODIUM CHLORIDE 9 MG/ML
INJECTION, SOLUTION INTRAVENOUS CONTINUOUS PRN
Status: DISCONTINUED | OUTPATIENT
Start: 2023-12-07 | End: 2023-12-07

## 2023-12-07 RX ORDER — MIDAZOLAM HYDROCHLORIDE 1 MG/ML
INJECTION INTRAMUSCULAR; INTRAVENOUS
Status: DISCONTINUED | OUTPATIENT
Start: 2023-12-07 | End: 2023-12-07

## 2023-12-07 RX ADMIN — PROPOFOL 20 MG: 10 INJECTION, EMULSION INTRAVENOUS at 11:12

## 2023-12-07 RX ADMIN — ONDANSETRON 4 MG: 2 INJECTION INTRAMUSCULAR; INTRAVENOUS at 11:12

## 2023-12-07 RX ADMIN — EPHEDRINE SULFATE 10 MG: 50 INJECTION INTRAVENOUS at 11:12

## 2023-12-07 RX ADMIN — DEXAMETHASONE SODIUM PHOSPHATE 4 MG: 4 INJECTION, SOLUTION INTRAMUSCULAR; INTRAVENOUS at 11:12

## 2023-12-07 RX ADMIN — SODIUM CHLORIDE: 0.9 INJECTION, SOLUTION INTRAVENOUS at 10:12

## 2023-12-07 RX ADMIN — CEFAZOLIN 2 G: 2 INJECTION, POWDER, FOR SOLUTION INTRAMUSCULAR; INTRAVENOUS at 11:12

## 2023-12-07 RX ADMIN — LIDOCAINE HYDROCHLORIDE 100 MG: 20 INJECTION INTRAVENOUS at 10:12

## 2023-12-07 RX ADMIN — PROPOFOL 150 MG: 10 INJECTION, EMULSION INTRAVENOUS at 11:12

## 2023-12-07 RX ADMIN — FENTANYL CITRATE 25 MCG: 50 INJECTION, SOLUTION INTRAMUSCULAR; INTRAVENOUS at 11:12

## 2023-12-07 RX ADMIN — EPHEDRINE SULFATE 5 MG: 50 INJECTION INTRAVENOUS at 11:12

## 2023-12-07 RX ADMIN — PHENAZOPYRIDINE HYDROCHLORIDE 100 MG: 100 TABLET ORAL at 01:12

## 2023-12-07 RX ADMIN — GLYCOPYRROLATE 0.2 MG: 0.2 INJECTION, SOLUTION INTRAMUSCULAR; INTRAVENOUS at 11:12

## 2023-12-07 RX ADMIN — SODIUM CHLORIDE, SODIUM LACTATE, POTASSIUM CHLORIDE, AND CALCIUM CHLORIDE: .6; .31; .03; .02 INJECTION, SOLUTION INTRAVENOUS at 11:12

## 2023-12-07 RX ADMIN — MIDAZOLAM HYDROCHLORIDE 2 MG: 1 INJECTION INTRAMUSCULAR; INTRAVENOUS at 10:12

## 2023-12-07 NOTE — PLAN OF CARE
Report given to MAYANK Michael assuming care. Pt has removed PIV. Pt Confused, cooperative. Spouse at bedside.

## 2023-12-07 NOTE — TRANSFER OF CARE
"Anesthesia Transfer of Care Note    Patient: Anthony Joseph Rockweiler    Procedure(s) Performed: Procedure(s) (LRB):  INSERTION, RADIOACTIVE SEED (N/A)  ULTRASOUND, RECTAL APPROACH (N/A)    Patient location: [facility]    Anesthesia Type: general    Transport from OR: Transported from OR on 6-10 L/min O2 by face mask with adequate spontaneous ventilation    Post pain: adequate analgesia    Post assessment: no apparent anesthetic complications and tolerated procedure well    Post vital signs: stable    Level of consciousness: sedated and responds to stimulation    Nausea/Vomiting: no nausea/vomiting    Complications: none    Transfer of care protocol was followed      Last vitals: Visit Vitals  /82 (BP Location: Left arm, Patient Position: Lying)   Pulse 67   Temp 37.6 °C (99.7 °F) (Temporal)   Resp 18   Ht 5' 8" (1.727 m)   Wt 77.1 kg (170 lb)   SpO2 97%   BMI 25.85 kg/m²     "

## 2023-12-07 NOTE — ASSESSMENT & PLAN NOTE
All benefits, risks, and alternatives were explained to the patient in great detail. All patient questions were addressed and the patient voiced clear understanding of our discussion. The patient has elected to undergo brachytherapy.    The patient denies all recent n/v/d, fevers, chills, and illnesses.     Urine dipstick - specific gravity 1.015, pH 7. Negative for all remaining components.

## 2023-12-07 NOTE — OP NOTE
[facility]  Operative Note    Date: 12/07/2023    Pre-Op Diagnosis: Prostate Cancer    Post-Op Diagnosis: same    Procedure(s) Performed:   1. Transrectal ultrasound of prostate  2. Ultrasound guided needle placement  3. Radioactive seed implantation in prostate  4. Fluoroscopy <1 hour    Specimen(s): none    Staff Surgeon: Deon Guzman MD, Bruce Vuong MD    Assistant Surgeon: Andriy Carreno MD    Anesthesia: General LMA anesthesia    Indications: Anthony Joseph Rockweiler is a 68 y.o. male with PCa who is s/p XRT presents for a booster with brachytherapy.     Findings: 70 seed implanted in systematic fashion, hemostasis achieved at the end of the case    Estimated Blood Loss: min    Drains: none    Procedure in detail:  The patient confirmed he had taken his pre-procedure antibiotics and did the bowel prep.  After risks benefits and possible complications of placement of radioactive seed placement were discussed, the patient elected to undergo the procedure and informed consent was obtained. All questions were answered in the pre-operative area. The patient was transferred to cystoscopy suite and placed in the supine position.  SCDs were applied and working.  Anesthesia was administered.  Once adequately sedated, the patient was placed in dorsal lithotomy position and prepped and draped in the usual sterile fashion. Time out was performed, chelsey-procedural antibiotics were confirmed    A 16-Indonesian Franks catheter was advanced into the bladder and 10 mL of sterile water was used to inflate the balloon.  The patient's bladder was emptied completely and filled with 120 mL of  sterile water.    The US probe was introduced into the patient's rectum and the secured with stabilizers in coordination with the patients previous prostate mapping.      A total of 70 preloaded seeds were placed trans perineally into the prostate in accordance to the patients previous prostate mapping under the guidance  of radiation oncology, who was present throughout the procedure.      A KUB was taken at the end of the procedure to confirm seed implantation.       Follow up care:  The patient will follow up with Dr. Vuong as scheduled.  He will get a CT scan of the abdomen and pelvis before leaving today. He will be discharged home with prescriptions for antibiotics, medrol dose pack, ibuprofen 800 TID, and flomax      nAdriy Carreno MD

## 2023-12-07 NOTE — DISCHARGE SUMMARY
[facility] (1st Fl)  Discharge Note  Short Stay    Procedure(s) (LRB):  INSERTION, RADIOACTIVE SEED (N/A)  ULTRASOUND, RECTAL APPROACH (N/A)      OUTCOME: Patient tolerated treatment/procedure well without complication and is now ready for discharge.    DISPOSITION: Home or Self Care    FINAL DIAGNOSIS:  Prostate cancer    FOLLOWUP: In clinic    DISCHARGE INSTRUCTIONS:    Discharge Procedure Orders   CT Guidance Radiation Therapy Field   Standing Status: Future Standing Exp. Date: 12/07/24     Order Specific Question Answer Comments   May the Radiologist modify the order per protocol to meet the clinical needs of the patient? Yes      No dressing needed     Notify your health care provider if you experience any of the following:  temperature >100.4     Notify your health care provider if you experience any of the following:  persistent nausea and vomiting or diarrhea     Notify your health care provider if you experience any of the following:  severe uncontrolled pain     Notify your health care provider if you experience any of the following:  difficulty breathing or increased cough     Notify your health care provider if you experience any of the following:  severe persistent headache     Notify your health care provider if you experience any of the following:  worsening rash     Notify your health care provider if you experience any of the following:  persistent dizziness, light-headedness, or visual disturbances     Activity as tolerated        TIME SPENT ON DISCHARGE: 15 minutes

## 2023-12-07 NOTE — ANESTHESIA PREPROCEDURE EVALUATION
12/07/2023  Anthony Joseph Rockweiler is a 68 y.o., male.    Procedure: INSERTION, RADIOACTIVE SEED Anesthesia type: General Diagnosis: Prostate cancer [C61]     Pre-operative evaluation for Procedure(s) (LRB):  INSERTION, RADIOACTIVE SEED (N/A)    @[id number]@@    Encounter Diagnosis   Name Primary?    Prostate cancer        Review of patient's allergies indicates:   Allergen Reactions    Paroxetine      Urinary problems       No medications prior to admission.            No current facility-administered medications on file prior to encounter.     Current Outpatient Medications on File Prior to Encounter   Medication Sig Dispense Refill    amLODIPine (NORVASC) 5 MG tablet Take 5 mg by mouth 2 (two) times daily.  3    carvedilol (COREG) 12.5 MG tablet Take 12.5 mg by mouth 2 (two) times daily with meals.      levothyroxine (SYNTHROID) 150 MCG tablet Take 1 tablet (150 mcg total) by mouth once daily. 30 tablet 11    LORazepam (ATIVAN) 1 MG tablet Take 1 tablet (1 mg total) by mouth 2 (two) times daily as needed for Anxiety. 60 tablet 2    losartan (COZAAR) 100 MG tablet Take 100 mg by mouth every morning.      pravastatin (PRAVACHOL) 40 MG tablet Take 1 tablet (40 mg total) by mouth once daily. (Patient taking differently: Take 40 mg by mouth every evening.) 30 tablet 11    valproic acid (DEPAKENE) 250 mg capsule Take 1 capsule (250 mg total) by mouth 4 (four) times daily. 120 capsule 11    benzonatate (TESSALON) 200 MG capsule Take 1 capsule (200 mg total) by mouth 3 (three) times daily as needed for Cough. 30 capsule 1    cyclobenzaprine (FLEXERIL) 10 MG tablet Take 10 mg by mouth every 8 (eight) hours as needed.      fluticasone propionate (FLONASE) 50 mcg/actuation nasal spray 1 spray (50 mcg total) by Each Nostril route once daily. 16 g 0    ibuprofen (ADVIL,MOTRIN) 800 MG tablet TAKE 1 TABLET BY MOUTH  "THREE TIMES DAILY AS NEEDED WITH FOOD 90 tablet 11    meloxicam (MOBIC) 15 MG tablet Take 15 mg by mouth.      promethazine-dextromethorphan (PROMETHAZINE-DM) 6.25-15 mg/5 mL Syrp Take 10-15ml by mouth every 8 hours as needed for coughing 240 mL 0    sildenafiL (VIAGRA) 100 MG tablet Take 1 tablet (100 mg total) by mouth daily as needed. 30 tablet 11    traMADoL (ULTRAM) 50 mg tablet Take 50 mg by mouth every 6 (six) hours as needed.         Past Medical History:  No date: Headache      Comment:  migraines  No date: Hx of psychiatric care  No date: Hypertension  No date: Prostate cancer  No date: Psychiatric problem  No date: Seizures  No date: Therapy    Past Surgical History:   Procedure Laterality Date    PROSTATE BIOPSY  05/02/2023       Social History     Tobacco Use   Smoking Status Never   Smokeless Tobacco Never       Social History     Substance and Sexual Activity   Alcohol Use No       Physical Activity: Sufficiently Active (7/12/2023)    Exercise Vital Sign     Days of Exercise per Week: 6 days     Minutes of Exercise per Session: 50 min         No results for input(s): "HCT" in the last 72 hours.  No results for input(s): "PLT" in the last 72 hours.  No results for input(s): "K" in the last 72 hours.  No results for input(s): "CREATININE" in the last 72 hours.  No results for input(s): "GLU" in the last 72 hours.  No results for input(s): "PT" in the last 72 hours.                    Pre-op Assessment          Review of Systems  Anesthesia Hx:  No problems with previous Anesthesia                Hematology/Oncology:  Hematology Normal                     Current/Recent Cancer. (this prostate diagnosed 2/2023)                Cardiovascular:     Hypertension, well controlled   Denies MI.        Denies Angina.         Admitted 2016 for eval of chest pain at [facility].  Was told he had no cardiac problems with that admit.                   Hypertension         Pulmonary:  Pulmonary Normal   Denies COPD.  " Denies Asthma.   Denies Shortness of breath.                  Renal/:   Denies Chronic Renal Disease.                Hepatic/GI:    Hiatal Hernia,  Denies GERD. Denies Liver Disease.            Neurological:  Denies TIA.  Denies CVA.   Headaches Seizures, well controlled    Head injury 1993,  residual tinnitus and occasional nausea, headaches and seizures. Dx of Headaches      Seizure Disorder                          Endocrine:  Denies Diabetes.           Psych:  Psychiatric History                  Physical Exam  General: Well nourished, Cooperative, Alert and Oriented    Airway:  Mallampati: II   Mouth Opening: Normal  TM Distance: Normal  Tongue: Normal  Neck ROM: Normal ROM        Anesthesia Plan  Type of Anesthesia, risks & benefits discussed:    Anesthesia Type: Gen Supraglottic Airway  Intra-op Monitoring Plan: Standard ASA Monitors  Post Op Pain Control Plan: IV/PO Opioids PRN  Induction:  IV  Informed Consent: Informed consent signed with the Patient and all parties understand the risks and agree with anesthesia plan.  All questions answered.   ASA Score: 2  Day of Surgery Review of History & Physical: H&P Update referred to the surgeon/provider.    Ready For Surgery From Anesthesia Perspective.     .

## 2023-12-07 NOTE — SUBJECTIVE & OBJECTIVE
Past Medical History:   Diagnosis Date    Headache     migraines    Hx of psychiatric care     Hypertension     Prostate cancer     Psychiatric problem     Seizures     Therapy        Past Surgical History:   Procedure Laterality Date    PROSTATE BIOPSY  05/02/2023       Review of patient's allergies indicates:   Allergen Reactions    Paroxetine      Urinary problems       Family History       Problem Relation (Age of Onset)    No Known Problems Mother, Father, Sister, Brother, Maternal Aunt, Maternal Uncle, Paternal Aunt, Paternal Uncle, Maternal Grandmother, Maternal Grandfather, Paternal Grandmother, Paternal Grandfather    Suicide Other            Tobacco Use    Smoking status: Never    Smokeless tobacco: Never   Substance and Sexual Activity    Alcohol use: No    Drug use: No    Sexual activity: Yes     Partners: Female     Birth control/protection: Condom       Review of Systems   Constitutional:  Negative for activity change, fatigue, fever and unexpected weight change.   HENT:  Negative for sore throat and trouble swallowing.    Eyes:  Negative for pain and discharge.   Respiratory:  Negative for chest tightness and shortness of breath.    Cardiovascular:  Negative for chest pain and palpitations.   Gastrointestinal:  Negative for abdominal pain, constipation, diarrhea, nausea and vomiting.   Genitourinary:         As per HPI   Musculoskeletal:  Negative for back pain and gait problem.   Skin:  Negative for rash and wound.   Neurological:  Negative for seizures and numbness.   Psychiatric/Behavioral:  Negative for hallucinations. The patient is not nervous/anxious.        Objective:           There is no height or weight on file to calculate BMI.    No intake/output data recorded.       Drains       None                     Physical Exam  Vitals and nursing note reviewed.   Constitutional:       Appearance: Normal appearance.   HENT:      Head: Atraumatic.      Nose: Nose normal.   Eyes:      Extraocular  "Movements: Extraocular movements intact.      Pupils: Pupils are equal, round, and reactive to light.   Cardiovascular:      Rate and Rhythm: Normal rate.   Pulmonary:      Effort: Pulmonary effort is normal.   Abdominal:      General: Abdomen is flat. There is no distension.      Tenderness: There is no abdominal tenderness. There is no right CVA tenderness or left CVA tenderness.   Musculoskeletal:         General: Normal range of motion.      Cervical back: Normal range of motion.   Skin:     Coloration: Skin is not jaundiced.   Neurological:      General: No focal deficit present.      Mental Status: He is alert and oriented to person, place, and time.   Psychiatric:         Mood and Affect: Mood normal.         Behavior: Behavior normal.          Significant Labs:    BMP:  No results for input(s): "NA", "K", "CL", "CO2", "BUN", "CREATININE", "LABGLOM", "GLUCOSE", "CALCIUM" in the last 168 hours.    CBC:  No results for input(s): "WBC", "HGB", "HCT", "PLT" in the last 168 hours.    All pertinent labs results from the past 24 hours have been reviewed.    Significant Imaging:  All pertinent imaging results/findings from the past 24 hours have been reviewed.              "

## 2023-12-07 NOTE — ANESTHESIA PROCEDURE NOTES
Intubation    Date/Time: 12/7/2023 11:03 AM    Performed by: Marleny Rocha CRNA  Authorized by: José Luis Roe Jr., MD    Intubation:     Induction:  Intravenous    Intubated:  Postinduction    Mask Ventilation:  Not attempted    Attempts:  1    Attempted By:  CRNA    Difficult Airway Encountered?: No      Complications:  None    Airway Device:  Supraglottic airway/LMA    Airway Device Size:  4.5    Style/Cuff Inflation:  Cuffed    Secured at:  The lips    Placement Verified By:  Capnometry    Complicating Factors:  None    Findings Post-Intubation:  BS equal bilateral and atraumatic/condition of teeth unchanged

## 2023-12-07 NOTE — PLAN OF CARE
Per Dr. Grace, Patient is stable and ready for discharge. patient voided. Instructions and prescription given to patient and family. Questions answered. Patient tolerating po liquids with no difficulty. Patient has no pain or states it is a tolerable level for them.

## 2023-12-07 NOTE — OP NOTE
[facility]  Operative Note    Date: 12/07/2023    Pre-Op Diagnosis: Prostate Cancer    Post-Op Diagnosis: same    Procedure(s) Performed:   1. Transrectal ultrasound of prostate  2. Ultrasound guided needle placement  3. Radioactive seed implantation in prostate  4. Fluoroscopy <1 hour    Specimen(s): none    Staff Surgeon: Deon Guzman MD, Bruce Vuong MD    Assistant Surgeon: Andriy Crareno MD    Anesthesia: General LMA anesthesia    Indications: Anthony Joseph Rockweiler is a 68 y.o. male with PCa who is s/p XRT presents for a booster with brachytherapy.     Findings: 70 seed implanted in systematic fashion, hemostasis achieved at the end of the case    Estimated Blood Loss: min    Drains: none    Procedure in detail:  The patient confirmed he had taken his pre-procedure antibiotics and did the bowel prep.  After risks benefits and possible complications of placement of radioactive seed placement were discussed, the patient elected to undergo the procedure and informed consent was obtained. All questions were answered in the pre-operative area. The patient was transferred to cystoscopy suite and placed in the supine position.  SCDs were applied and working.  Anesthesia was administered.  Once adequately sedated, the patient was placed in dorsal lithotomy position and prepped and draped in the usual sterile fashion. Time out was performed, chelsey-procedural antibiotics were confirmed    A 16-[place] Franks catheter was advanced into the bladder and 10 mL of sterile water was used to inflate the balloon.  The patient's bladder was emptied completely and filled with 120 mL of  sterile water.    The US probe was introduced into the patient's rectum and the secured with stabilizers in coordination with the patients previous prostate mapping.      A total of 70 preloaded seeds were placed trans perineally into the prostate in accordance to the patients previous prostate mapping under the guidance  of radiation oncology, who was present throughout the procedure.      A KUB was taken at the end of the procedure to confirm seed implantation.       Follow up care:  The patient will follow up with Dr. Vuong as scheduled.  He will get a CT scan of the abdomen and pelvis before leaving today. He will be discharged home with prescriptions for antibiotics, medrol dose pack, ibuprofen 800 TID, and flomax      nAdriy Carreno MD     I have reviewed the operative note performed by Dr. Carreno, and I concur with her/his documentation of Anthony Joseph Rockweiler. I was present for the critical or key portions of the procedure.

## 2023-12-07 NOTE — ANESTHESIA POSTPROCEDURE EVALUATION
Anesthesia Post Evaluation    Patient: Anthony Joseph Rockweiler    Procedure(s) Performed: Procedure(s) (LRB):  INSERTION, RADIOACTIVE SEED (N/A)  ULTRASOUND, RECTAL APPROACH (N/A)    Final Anesthesia Type: general      Patient location during evaluation: PACU  Patient participation: Yes- Able to Participate  Level of consciousness: awake and alert and oriented  Post-procedure vital signs: reviewed and stable  Pain management: adequate  Airway patency: patent    PONV status at discharge: No PONV  Anesthetic complications: no      Cardiovascular status: stable  Respiratory status: unassisted, spontaneous ventilation and room air  Hydration status: euvolemic  Follow-up not needed.              Vitals Value Taken Time   /68 12/07/23 1204   Temp  12/07/23 1212   Pulse 78 12/07/23 1212   Resp 19 12/07/23 1212   SpO2 95 % 12/07/23 1212   Vitals shown include unvalidated device data.      No case tracking events are documented in the log.      Pain/Jeff Score: No data recorded

## 2023-12-07 NOTE — DISCHARGE INSTRUCTIONS
Post Prostate Brachyytherapy Seed Implantation Instructions  Do not strain to have a bowel movement  No strenuous exercise x 7 days  No driving while you are on narcotic pain medications or if your villarreal  catheter is in place    You can expect:  See a small amount of  blood in your urine    If you have a catheter, please return to the ER if your catheter stops draining or you are having abdominal pain.    Call the doctor if:  Temperature is greater than 101F  Persistent vomiting and inability to keep food down  Inability to void if you do not have a catheter

## 2023-12-07 NOTE — H&P
[facility] - Surgery ([facility])  Urology  History & Physical    Patient Name: Anthony Joseph Rockweiler  MRN: [id number]  Admission Date: 12/7/2023  Code Status: No Order   Attending Provider: Deon Guzman MD   Primary Care Physician: Myla Bryson DO  Principal Problem:<principal problem not specified>    Subjective:     HPI:  Mr. Rockweiler has a history of Stage IIIA (T2a, N0, M0, PSA > 20 GG1) prostate cancer  He presented with a  screening PSA of 29.5 ng/ml. in February of 2023.  He admitted to using weekly testosterone cypionate for several years.  Biopsies on 5/2/23 revealed [place] 6 (3+3) adenocarcinoma involving 11% of the core from the Rt. medial mid gland.  The remaining 11 cores revealed benign prostatic tissue. PSMA scan on 6/19/23 revealed focal increased radiotracer uptake in the central aspect of the prostate (max SUV 4.9) but no evidence of regional or distant metastatic disease.  MRI scan revealed a 47 cc  prostate with a 1.6 cm T2 hypointense lesion in the peripheral Rt. mid gland, PI-RADS 5 with no extraprostatic extension.  There was a 1.9 cm T2 hypointense lesion in the base mid transitional zone, PI-RADS 3 with no extraprostatic extension.  The seminal vesicles and neurovascular bundles were unremarkable. We discussed treatment options.  The patient wanted to delay therapy and repeat his PSA since his testosterone replacement was discontinued.   He has been seen for XRT by Dr. Vuong, he now presents for brachytherapy.     Past Medical History:   Diagnosis Date    Headache     migraines    Hx of psychiatric care     Hypertension     Prostate cancer     Psychiatric problem     Seizures     Therapy        Past Surgical History:   Procedure Laterality Date    PROSTATE BIOPSY  05/02/2023       Review of patient's allergies indicates:   Allergen Reactions    Paroxetine      Urinary problems       Family History       Problem Relation (Age of Onset)    No Known Problems Mother, Father,  Sister, Brother, Maternal Aunt, Maternal Uncle, Paternal Aunt, Paternal Uncle, Maternal Grandmother, Maternal Grandfather, Paternal Grandmother, Paternal Grandfather    Suicide Other            Tobacco Use    Smoking status: Never    Smokeless tobacco: Never   Substance and Sexual Activity    Alcohol use: No    Drug use: No    Sexual activity: Yes     Partners: Female     Birth control/protection: Condom       Review of Systems   Constitutional:  Negative for activity change, fatigue, fever and unexpected weight change.   HENT:  Negative for sore throat and trouble swallowing.    Eyes:  Negative for pain and discharge.   Respiratory:  Negative for chest tightness and shortness of breath.    Cardiovascular:  Negative for chest pain and palpitations.   Gastrointestinal:  Negative for abdominal pain, constipation, diarrhea, nausea and vomiting.   Genitourinary:         As per HPI   Musculoskeletal:  Negative for back pain and gait problem.   Skin:  Negative for rash and wound.   Neurological:  Negative for seizures and numbness.   Psychiatric/Behavioral:  Negative for hallucinations. The patient is not nervous/anxious.        Objective:           There is no height or weight on file to calculate BMI.    No intake/output data recorded.       Drains       None                     Physical Exam  Vitals and nursing note reviewed.   Constitutional:       Appearance: Normal appearance.   HENT:      Head: Atraumatic.      Nose: Nose normal.   Eyes:      Extraocular Movements: Extraocular movements intact.      Pupils: Pupils are equal, round, and reactive to light.   Cardiovascular:      Rate and Rhythm: Normal rate.   Pulmonary:      Effort: Pulmonary effort is normal.   Abdominal:      General: Abdomen is flat. There is no distension.      Tenderness: There is no abdominal tenderness. There is no right CVA tenderness or left CVA tenderness.   Musculoskeletal:         General: Normal range of motion.      Cervical back:  "Normal range of motion.   Skin:     Coloration: Skin is not jaundiced.   Neurological:      General: No focal deficit present.      Mental Status: He is alert and oriented to person, place, and time.   Psychiatric:         Mood and Affect: Mood normal.         Behavior: Behavior normal.          Significant Labs:    BMP:  No results for input(s): "NA", "K", "CL", "CO2", "BUN", "CREATININE", "LABGLOM", "GLUCOSE", "CALCIUM" in the last 168 hours.    CBC:  No results for input(s): "WBC", "HGB", "HCT", "PLT" in the last 168 hours.    All pertinent labs results from the past 24 hours have been reviewed.    Significant Imaging:  All pertinent imaging results/findings from the past 24 hours have been reviewed.                Assessment and Plan:     Prostate cancer  All benefits, risks, and alternatives were explained to the patient in great detail. All patient questions were addressed and the patient voiced clear understanding of our discussion. The patient has elected to undergo brachytherapy.    The patient denies all recent n/v/d, fevers, chills, and illnesses.     Urine dipstick - specific gravity 1.015, pH 7. Negative for all remaining components.         VTE Risk Mitigation (From admission, onward)      None            Andriy Carreno MD  Urology  [facility] - Surgery (1st Fl)  "

## 2023-12-07 NOTE — HPI
Mr. Rockweiler has a history of Stage IIIA (T2a, N0, M0, PSA > 20 GG1) prostate cancer  He presented with a  screening PSA of 29.5 ng/ml. in February of 2023.  He admitted to using weekly testosterone cypionate for several years.  Biopsies on 5/2/23 revealed [place] 6 (3+3) adenocarcinoma involving 11% of the core from the Rt. medial mid gland.  The remaining 11 cores revealed benign prostatic tissue. PSMA scan on 6/19/23 revealed focal increased radiotracer uptake in the central aspect of the prostate (max SUV 4.9) but no evidence of regional or distant metastatic disease.  MRI scan revealed a 47 cc  prostate with a 1.6 cm T2 hypointense lesion in the peripheral Rt. mid gland, PI-RADS 5 with no extraprostatic extension.  There was a 1.9 cm T2 hypointense lesion in the base mid transitional zone, PI-RADS 3 with no extraprostatic extension.  The seminal vesicles and neurovascular bundles were unremarkable. We discussed treatment options.  The patient wanted to delay therapy and repeat his PSA since his testosterone replacement was discontinued.   He has been seen for XRT by Dr. Vuong, he now presents for brachytherapy.

## 2023-12-08 ENCOUNTER — TELEPHONE (OUTPATIENT)
Dept: RADIATION ONCOLOGY | Facility: CLINIC | Age: 68
End: 2023-12-08
Payer: MEDICARE

## 2023-12-08 NOTE — TELEPHONE ENCOUNTER
Phone review s/p brachytherapy, no answer. Left a voicemail stating if he should have any questions or concerns over-the-weekend to contact the on-call line. Contact information was provided.

## 2023-12-13 ENCOUNTER — TELEPHONE (OUTPATIENT)
Dept: RADIATION ONCOLOGY | Facility: CLINIC | Age: 68
End: 2023-12-13
Payer: MEDICARE

## 2023-12-14 ENCOUNTER — HOSPITAL ENCOUNTER (EMERGENCY)
Facility: HOSPITAL | Age: 68
Discharge: HOME | End: 2023-12-15
Attending: EMERGENCY MEDICINE
Payer: MEDICARE

## 2023-12-14 DIAGNOSIS — R53.83 FATIGUE, UNSPECIFIED TYPE: Primary | ICD-10-CM

## 2023-12-14 DIAGNOSIS — R51.9 ACUTE NONINTRACTABLE HEADACHE, UNSPECIFIED HEADACHE TYPE: ICD-10-CM

## 2023-12-14 PROCEDURE — 99284 EMERGENCY DEPT VISIT MOD MDM: CPT | Mod: 25,HCNC,ER

## 2023-12-14 PROCEDURE — 81003 URINALYSIS AUTO W/O SCOPE: CPT | Mod: HCNC,ER

## 2023-12-14 PROCEDURE — 99285 EMERGENCY DEPT VISIT HI MDM: CPT | Mod: 25,HCNC,ER

## 2023-12-14 PROCEDURE — 84484 ASSAY OF TROPONIN QUANT: CPT | Mod: HCNC,ER

## 2023-12-14 PROCEDURE — 85025 COMPLETE CBC W/AUTO DIFF WBC: CPT | Mod: HCNC,ER

## 2023-12-14 PROCEDURE — 96360 HYDRATION IV INFUSION INIT: CPT | Mod: HCNC,ER

## 2023-12-14 PROCEDURE — 80053 COMPREHEN METABOLIC PANEL: CPT | Mod: HCNC,ER

## 2023-12-15 ENCOUNTER — TELEPHONE (OUTPATIENT)
Dept: RADIATION ONCOLOGY | Facility: CLINIC | Age: 68
End: 2023-12-15
Payer: MEDICARE

## 2023-12-15 ENCOUNTER — PATIENT MESSAGE (OUTPATIENT)
Dept: RADIATION ONCOLOGY | Facility: CLINIC | Age: 68
End: 2023-12-15
Payer: MEDICARE

## 2023-12-15 VITALS
DIASTOLIC BLOOD PRESSURE: 67 MMHG | SYSTOLIC BLOOD PRESSURE: 106 MMHG | HEIGHT: 68 IN | WEIGHT: 170 LBS | TEMPERATURE: 98 F | RESPIRATION RATE: 18 BRPM | HEART RATE: 56 BPM | BODY MASS INDEX: 25.76 KG/M2 | OXYGEN SATURATION: 96 %

## 2023-12-15 LAB
ALBUMIN SERPL-MCNC: 3.3 G/DL (ref 3.3–5.5)
ALLENS TEST: ABNORMAL
ALP SERPL-CCNC: 79 U/L (ref 42–141)
BILIRUB SERPL-MCNC: 0.6 MG/DL (ref 0.2–1.6)
BILIRUBIN, POC UA: NEGATIVE
BLOOD, POC UA: ABNORMAL
BUN SERPL-MCNC: 20 MG/DL (ref 7–22)
CALCIUM SERPL-MCNC: 9.3 MG/DL (ref 8–10.3)
CHLORIDE SERPL-SCNC: 107 MMOL/L (ref 98–108)
CLARITY, POC UA: CLEAR
COLOR, POC UA: YELLOW
CREAT SERPL-MCNC: 1.1 MG/DL (ref 0.6–1.2)
GLUCOSE SERPL-MCNC: 122 MG/DL (ref 73–118)
GLUCOSE, POC UA: NEGATIVE
HCO3 UR-SCNC: 27.1 MMOL/L (ref 24–28)
HCT, POC: NORMAL
HGB, POC: NORMAL (ref 14–18)
KETONES, POC UA: ABNORMAL
LDH SERPL L TO P-CCNC: 1.03 MMOL/L (ref 0.5–2.2)
LEUKOCYTE EST, POC UA: NEGATIVE
MCH, POC: NORMAL
MCHC, POC: NORMAL
MCV, POC: NORMAL
MPV, POC: NORMAL
NITRITE, POC UA: NEGATIVE
PCO2 BLDA: 46 MMHG (ref 35–45)
PH SMN: 7.38 [PH] (ref 7.35–7.45)
PH UR STRIP: 6 [PH]
PO2 BLDA: 32 MMHG (ref 40–60)
POC ALT (SGPT): 37 U/L (ref 10–47)
POC AST (SGOT): 31 U/L (ref 11–38)
POC BE: 1 MMOL/L
POC CARDIAC TROPONIN I: 0 NG/ML (ref 0–0.08)
POC PLATELET COUNT: NORMAL
POC SATURATED O2: 59 % (ref 95–100)
POC TCO2: 28 MMOL/L (ref 18–33)
POC TCO2: 28 MMOL/L (ref 24–29)
POTASSIUM BLD-SCNC: 4.2 MMOL/L (ref 3.6–5.1)
PROTEIN, POC UA: ABNORMAL
PROTEIN, POC: 6.1 G/DL (ref 6.4–8.1)
RBC, POC: NORMAL
RDW, POC: NORMAL
SAMPLE: ABNORMAL
SAMPLE: NORMAL
SITE: ABNORMAL
SODIUM BLD-SCNC: 143 MMOL/L (ref 128–145)
SPECIFIC GRAVITY, POC UA: >=1.03
UROBILINOGEN, POC UA: 1 E.U./DL
WBC, POC: NORMAL

## 2023-12-15 PROCEDURE — 25000003 PHARM REV CODE 250: Mod: HCNC,ER | Performed by: EMERGENCY MEDICINE

## 2023-12-15 PROCEDURE — 96360 HYDRATION IV INFUSION INIT: CPT | Mod: HCNC,ER

## 2023-12-15 PROCEDURE — 82803 BLOOD GASES ANY COMBINATION: CPT | Mod: HCNC,ER

## 2023-12-15 RX ORDER — ONDANSETRON 8 MG/1
8 TABLET, ORALLY DISINTEGRATING ORAL EVERY 6 HOURS PRN
Qty: 15 TABLET | Refills: 0 | Status: SHIPPED | OUTPATIENT
Start: 2023-12-15 | End: 2023-12-18

## 2023-12-15 RX ADMIN — SODIUM CHLORIDE 1000 ML: 9 INJECTION, SOLUTION INTRAVENOUS at 12:12

## 2023-12-15 NOTE — ED PROVIDER NOTES
Encounter Date: 12/14/2023    SCRIBE #1 NOTE: I, JEFF Staples, am scribing for, and in the presence of,  Arturo Orellana MD. I have scribed the following portions of the note - Other sections scribed: HPI, ROS, PE.       History     Chief Complaint   Patient presents with    Headache     Reports daily headaches and nausea since Monday. Taken zofran at home w/out relief.      Anthony Joseph Rockweiler is a 68 y.o. male, with a history of HLD and prostate cancer, who presents to the ED with an acute frontal headache which started 4 days ago. Associated symptoms include nausea, generalized weakness, fatigue, and decreased urination. Pt believes it may be due to dehydration, since he has not been drinking as many fluids recently. Patient reports he was taking radiation treatments from September to November for prostate cancer. Pt received a seed implant ~1 week ago. Pt has been taken medications such as Flomax, Medrol, Ibuprofen, Pepcid, Zofran, and Lorazepam. He denies any difficulty urinating, hematuria, dysuria, penile discharge, frequency, blood in stool, cough, sore throat, vision changes, dizziness, light headedness, or generalized myalgias. Denies alcohol, caffeine or tobacco use. Pt is .     The history is provided by the patient. No  was used.     Review of patient's allergies indicates:   Allergen Reactions    Paroxetine      Urinary problems     Past Medical History:   Diagnosis Date    Headache     migraines    Hx of psychiatric care     Hypertension     Prostate cancer     Psychiatric problem     Seizures     Therapy      Past Surgical History:   Procedure Laterality Date    PROSTATE BIOPSY  05/02/2023    RADIOACTIVE SEED IMPLANTATION N/A 12/7/2023    Procedure: INSERTION, RADIOACTIVE SEED;  Surgeon: Deon Guzman MD;  Location: [facility] OR [address] [place] [address];  Service: Urology;  Laterality: N/A;    TRANSRECTAL ULTRASOUND EXAMINATION N/A 12/7/2023    Procedure: ULTRASOUND, RECTAL  APPROACH;  Surgeon: Deon Guzman MD;  Location: [facility] OR [address] [place] [address];  Service: Urology;  Laterality: N/A;     Family History   Problem Relation Age of Onset    No Known Problems Mother     No Known Problems Father     No Known Problems Sister     No Known Problems Brother     No Known Problems Maternal Aunt     No Known Problems Maternal Uncle     No Known Problems Paternal Aunt     No Known Problems Paternal Uncle     No Known Problems Maternal Grandmother     No Known Problems Maternal Grandfather     No Known Problems Paternal Grandmother     No Known Problems Paternal Grandfather     Suicide Other     Dementia Neg Hx      Social History     Tobacco Use    Smoking status: Never    Smokeless tobacco: Never   Substance Use Topics    Alcohol use: No    Drug use: No     Review of Systems   Constitutional:  Positive for fatigue.   HENT:  Negative for sore throat.    Eyes:  Negative for visual disturbance.   Respiratory:  Negative for cough.    Gastrointestinal:  Positive for nausea. Negative for blood in stool.   Genitourinary:  Positive for decreased urine volume. Negative for difficulty urinating, dysuria, frequency, hematuria and penile discharge.   Musculoskeletal:  Negative for myalgias.   Neurological:  Positive for weakness and headaches. Negative for dizziness and light-headedness.   All other systems reviewed and are negative.      Physical Exam     Initial Vitals [12/14/23 2043]   BP Pulse Resp Temp SpO2   133/89 71 16 98.2 °F (36.8 °C) 98 %      MAP       --         Physical Exam    Nursing note and vitals reviewed.  Constitutional: He appears well-developed and well-nourished. He is not diaphoretic.  Non-toxic appearance. He does not have a sickly appearance. He does not appear ill. No distress.   HENT:   Head: Normocephalic and atraumatic.   Mouth/Throat: Mucous membranes are dry.   Eyes: Conjunctivae and EOM are normal. Pupils are equal, round, and reactive to light.   Neck: Phonation normal. Neck  supple. No stridor present.   Normal range of motion.  Cardiovascular:  Normal rate, regular rhythm and intact distal pulses.           Pulmonary/Chest: Effort normal. No accessory muscle usage or stridor. No tachypnea. No respiratory distress.   Abdominal: He exhibits no distension. There is no abdominal tenderness.   Musculoskeletal:         General: No tenderness. Normal range of motion.      Cervical back: Normal range of motion and neck supple.     Neurological: He is alert and oriented to person, place, and time. He has normal strength. No cranial nerve deficit or sensory deficit. He exhibits normal muscle tone. Gait normal. GCS score is 15. GCS eye subscore is 4. GCS verbal subscore is 5. GCS motor subscore is 6.   Skin: Skin is warm, dry and intact. No rash noted.   Psychiatric: He has a normal mood and affect. His behavior is normal.         ED Course   Procedures  Labs Reviewed   POCT URINALYSIS W/O SCOPE - Abnormal; Notable for the following components:       Result Value    Ketones, UA 1+ (*)     Spec Grav UA >=1.030 (*)     Blood, UA 1+ (*)     Protein, UA Trace (*)     All other components within normal limits   ISTAT PROCEDURE - Abnormal; Notable for the following components:    POC PCO2 46.0 (*)     POC PO2 32 (*)     All other components within normal limits   POCT CMP - Abnormal; Notable for the following components:    POC Glucose 122 (*)     Protein, POC 6.1 (*)     All other components within normal limits   TROPONIN ISTAT   POCT CBC   POCT CMP   POCT TROPONIN          Imaging Results              CT Head Without Contrast (Final result)  Result time 12/14/23 23:03:02      Final result by Vijaya Tucker MD (12/14/23 23:03:02)                   Impression:      No acute intracranial abnormalities identified.      Electronically signed by: Vijaya Tucker MD  Date:    12/14/2023  Time:    23:03               Narrative:    EXAMINATION:  CT HEAD WITHOUT CONTRAST    CLINICAL HISTORY:  Headache, new  or worsening (Age >= 50y);    TECHNIQUE:  Low dose axial images were obtained through the head.  Coronal and sagittal reformations were also performed. Contrast was not administered.  Rapid AI screening was performed.    COMPARISON:  None.    FINDINGS:  Moderate-size region of encephalomalacia is seen within the anterior inferior left frontal lobe.  Left frontal craniotomy defect as well as anterior and posterior wall left frontal sinus defects are seen in this region with complete opacification of the left frontal sinus.  Above similar findings were described on previous outside facility report from 2019.    No evidence of acute/recent major vascular distribution cerebral infarction, intraparenchymal hemorrhage, or intra-axial space occupying lesion. The ventricular system is normal in size and configuration with no evidence of hydrocephalus. No effacement of the skull-base cisterns. No abnormal extra-axial fluid collections or blood products.  Complete opacification is seen of the left frontal sinus, as above.  Remaining visualized paranasal sinuses and mastoid air cells are clear.                                       Medications   sodium chloride 0.9% bolus 1,000 mL 1,000 mL (0 mLs Intravenous Stopped 12/15/23 0145)     Medical Decision Making  Amount and/or Complexity of Data Reviewed  Labs: ordered.  Radiology: ordered.               ED Course as of 01/13/24 0627   Fri Dec 15, 2023   0146 Headache resolved. Patient states he feels much better [DL]      ED Course User Index  [DL] Arturo Orellana MD               Medical Decision Making:   Differential Diagnosis:   Febrile headache, tension headache, migraine headache, cluster headache, temporal arteritis, dehydration, sinus headache, hypertensive emergency, CVA, cavernous sinus thrombosis, meningitis, pseudotumor others    Clinical Tests:   Lab Tests: Reviewed and Ordered  Radiological Study: Ordered and Reviewed  ED Management:  Patient afebrile,  well-appearing, NAD with normal mentation, BP mildly decreased from baseline. Mild bradycardia.  No focal neuro deficits.  Headache red flags include age and history of prostate cancer. Labs suggest mild dehydration. CT head negative for acute abnormality. Headache resolved with ED treatment.  Test results, imaging results, outpatient management plan, outpatient PCP/Heme-Onc follow-up and ED return precautions discussed with patient with understanding and agreement.           Labs Reviewed    Admission on 12/14/2023, Discharged on 12/15/2023   Component Date Value Ref Range Status    Glucose, UA 12/15/2023 Negative   Final    Bilirubin, UA 12/15/2023 Negative   Final    Ketones, UA 12/15/2023 1+ (A)   Final    Spec Grav UA 12/15/2023 >=1.030 (>)   Final    Blood, UA 12/15/2023 1+ (A)   Final    PH, UA 12/15/2023 6.0   Final    Protein, UA 12/15/2023 Trace (A)   Final    Urobilinogen, UA 12/15/2023 1.0  E.U./dL Final    Nitrite, UA 12/15/2023 Negative   Final    Leukocytes, UA 12/15/2023 Negative   Final    Color, UA 12/15/2023 Yellow   Final    Clarity, UA 12/15/2023 Clear   Final    POC PH 12/15/2023 7.378  7.35 - 7.45 Final    POC PCO2 12/15/2023 46.0 (H)  35 - 45 mmHg Final    POC PO2 12/15/2023 32 (LL)  40 - 60 mmHg Final    POC HCO3 12/15/2023 27.1  24 - 28 mmol/L Final    POC BE 12/15/2023 1  -2 to 2 mmol/L Final    POC SATURATED O2 12/15/2023 59  95 - 100 % Final    POC Lactate 12/15/2023 1.03  0.5 - 2.2 mmol/L Final    POC TCO2 12/15/2023 28  24 - 29 mmol/L Final    Sample 12/15/2023 VENOUS   Final    Site 12/15/2023 Other   Final    Allens Test 12/15/2023 N/A   Final    POC Cardiac Troponin I 12/15/2023 0.00  0.00 - 0.08 ng/mL Final    Sample 12/15/2023 unknown   Final    Comment: A single negative troponin is insufficient to rule out myocardial infarction.  The use of a serial sampling protocol is recommended practice. Correlate results with reference intervals established for methodology used. Point of  care and core laboratory   troponin results are not interchangeable.      Albumin, POC 12/15/2023 3.3  3.3 - 5.5 g/dL Final    Alkaline Phosphatase, POC 12/15/2023 79  42 - 141 U/L Final    ALT (SGPT), POC 12/15/2023 37  10 - 47 U/L Final    AST (SGOT), POC 12/15/2023 31  11 - 38 U/L Final    POC BUN 12/15/2023 20  7 - 22 mg/dL Final    Calcium, POC 12/15/2023 9.3  8.0 - 10.3 mg/dL Final    POC Chloride 12/15/2023 107  98 - 108 mmol/L Final    POC Creatinine 12/15/2023 1.1  0.6 - 1.2 mg/dL Final    POC Glucose 12/15/2023 122 (H)  73 - 118 mg/dL Final    POC Potassium 12/15/2023 4.2  3.6 - 5.1 mmol/L Final    POC Sodium 12/15/2023 143  128 - 145 mmol/L Final    Bilirubin, POC 12/15/2023 0.6  0.2 - 1.6 mg/dL Final    POC TCO2 12/15/2023 28  18 - 33 mmol/L Final    Protein, POC 12/15/2023 6.1 (L)  6.4 - 8.1 g/dL Final        Imaging Reviewed    Imaging Results              CT Head Without Contrast (Final result)  Result time 12/14/23 23:03:02      Final result by Vijaya Tucker MD (12/14/23 23:03:02)                   Impression:      No acute intracranial abnormalities identified.      Electronically signed by: Vijaya Tucker MD  Date:    12/14/2023  Time:    23:03               Narrative:    EXAMINATION:  CT HEAD WITHOUT CONTRAST    CLINICAL HISTORY:  Headache, new or worsening (Age >= 50y);    TECHNIQUE:  Low dose axial images were obtained through the head.  Coronal and sagittal reformations were also performed. Contrast was not administered.  Rapid AI screening was performed.    COMPARISON:  None.    FINDINGS:  Moderate-size region of encephalomalacia is seen within the anterior inferior left frontal lobe.  Left frontal craniotomy defect as well as anterior and posterior wall left frontal sinus defects are seen in this region with complete opacification of the left frontal sinus.  Above similar findings were described on previous outside facility report from 2019.    No evidence of acute/recent major vascular  distribution cerebral infarction, intraparenchymal hemorrhage, or intra-axial space occupying lesion. The ventricular system is normal in size and configuration with no evidence of hydrocephalus. No effacement of the skull-base cisterns. No abnormal extra-axial fluid collections or blood products.  Complete opacification is seen of the left frontal sinus, as above.  Remaining visualized paranasal sinuses and mastoid air cells are clear.                                      Medications given in ED    Medications   sodium chloride 0.9% bolus 1,000 mL 1,000 mL (0 mLs Intravenous Stopped 12/15/23 0145)         Note was created using voice recognition software. Note may have occasional typographical errors that may not have been identified and edited despite good marcel initial review prior to signing.    I, Arturo Orellana MD, personally performed the services described in this documentation. All medical record entries made by the scribe were at my direction and in my presence.  I have reviewed the chart and agree that the record reflects my personal performance and is accurate and complete.     Clinical Impression:  Final diagnoses:  [R53.83] Fatigue, unspecified type (Primary)  [R51.9] Acute nonintractable headache, unspecified headache type          ED Disposition Condition    Discharge Stable          ED Prescriptions       Medication Sig Dispense Start Date End Date Auth. Provider    ondansetron (ZOFRAN-ODT) 8 MG TbDL () Take 1 tablet (8 mg total) by mouth every 6 (six) hours as needed. 15 tablet 12/15/2023 2023 Arturo Orellana MD          Follow-up Information       Follow up With Specialties Details Why Contact Info    The nearest emergency department.  Go to  As needed, If symptoms worsen     Myla Bryson., DO Family Medicine Today to schedule an appointment, for re-evaluation of today's complaint, and ongoing care [id number] [place] [address] [facility] [place]  [place]  [id number]  [phone]      Your Heme/Onc Doctor                 Arturo Orellana MD  01/13/24 0637

## 2023-12-15 NOTE — TELEPHONE ENCOUNTER
Phone review s/p brachytherapy. Overall doing well since visit to ED for dehydration. Overall doing well since a visit to the ED for dehydration. The client states he is having some difficulty urinating (not taking Ibuprofen as directed.) I encouraged him to start the Ibuprofen as prescribed. No hematuria, nocturia x 2. No diarrhea. Currently, he is taking Flomax. We will continue to follow. The client agrees to contact our office if he should have any questions or concerns.

## 2023-12-17 PROCEDURE — 77318 BRACHYTX ISODOSE COMPLEX: CPT | Mod: 26,HCNC,, | Performed by: RADIOLOGY

## 2023-12-17 PROCEDURE — 77318 BRACHYTX ISODOSE COMPLEX: CPT | Mod: TC,HCNC | Performed by: RADIOLOGY

## 2023-12-18 ENCOUNTER — OFFICE VISIT (OUTPATIENT)
Dept: RADIATION ONCOLOGY | Facility: CLINIC | Age: 68
End: 2023-12-18
Attending: RADIOLOGY
Payer: MEDICARE

## 2023-12-18 VITALS
HEIGHT: 68 IN | SYSTOLIC BLOOD PRESSURE: 134 MMHG | WEIGHT: 175.63 LBS | OXYGEN SATURATION: 95 % | DIASTOLIC BLOOD PRESSURE: 85 MMHG | BODY MASS INDEX: 26.62 KG/M2 | RESPIRATION RATE: 16 BRPM | HEART RATE: 72 BPM

## 2023-12-18 DIAGNOSIS — C61 PROSTATE CANCER: Primary | ICD-10-CM

## 2023-12-18 PROCEDURE — 99999 PR PBB SHADOW E&M-EST. PATIENT-LVL III: CPT | Mod: PBBFAC,HCNC,, | Performed by: RADIOLOGY

## 2023-12-18 PROCEDURE — 99499 NO LOS: ICD-10-PCS | Mod: HCNC,S$GLB,, | Performed by: RADIOLOGY

## 2023-12-18 PROCEDURE — 99999 PR PBB SHADOW E&M-EST. PATIENT-LVL III: ICD-10-PCS | Mod: PBBFAC,HCNC,, | Performed by: RADIOLOGY

## 2023-12-18 PROCEDURE — 99499 UNLISTED E&M SERVICE: CPT | Mod: HCNC,S$GLB,, | Performed by: RADIOLOGY

## 2023-12-19 NOTE — PROGRESS NOTES
Multidisciplinary Uro-Oncology Clinic  [facility] - Radiation Oncology    Patient ID: Anthony Joseph Rockweiler is a 68 y.o. male.    Chief Complaint: Prostate Cancer (S/p seed implant)    This patient returns for his initial follow up visit.     Mr. Rockweiler has a history of Stage IIIA (T2a, N0, M0, PSA > 20 GG1) prostate cancer  He presented with a  screening PSA of 29.5 ng/ml in February of 2023.  He admitted to using weekly testosterone cypionate for several years.  Biopsies on 5/2/23 revealed Citlali 6 (3+3) adenocarcinoma involving 11% of the core from the Rt. medial mid gland.  The remaining 11 cores revealed benign prostatic tissue. PSMA scan revealed increased uptake in the central aspect of the prostate, but no evidence of regional or distant metastatic disease.  MRI scan revealed a 47 cc  prostate with a 1.6 cm T2 hypointense lesion in the peripheral Rt. mid gland, PI-RADS 5 with no extraprostatic extension.  There was a 1.9 cm T2 hypointense lesion in the base mid transitional zone, PI-RADS 3 with no extraprostatic extension.  The seminal vesicles and neurovascular bundles were unremarkable.   Polaris indicated single modality therapy.  We discussed a number of options including rebiopsy.  The patient elected to proceed with radiotherapy.  He completed 46 Gy to the prostate with 50 Gy to the seminal vesicles. On 12/7/23 the patient underwent transperneal implantation of the prostate gland using Iodine 125 seeds.  Today the patient states he feels well.  No significant complaints.       Review of Systems   Constitutional:  Negative for activity change, appetite change, chills, diaphoresis and fatigue.   Respiratory:  Negative for cough and shortness of breath.    Gastrointestinal:  Negative for abdominal pain, constipation, diarrhea and fecal incontinence.   Genitourinary:  Negative for bladder incontinence, difficulty urinating, dysuria, erectile dysfunction, frequency and  hematuria.     Physical Exam  Constitutional:       General: He is not in acute distress.     Appearance: Normal appearance.   Neurological:      Mental Status: He is alert and oriented to person, place, and time.   Psychiatric:         Mood and Affect: Mood normal.         Judgment: Judgment normal.          Assessment and Plan    Tolerating the acute effects of radiotherapy well.  Plan follow up in 2 months with PSA.

## 2024-01-10 DIAGNOSIS — C61 PROSTATE CANCER: ICD-10-CM

## 2024-01-10 RX ORDER — TAMSULOSIN HYDROCHLORIDE 0.4 MG/1
CAPSULE ORAL
Qty: 30 CAPSULE | Refills: 6 | Status: SHIPPED | OUTPATIENT
Start: 2024-01-10 | End: 2024-01-25 | Stop reason: DRUGHIGH

## 2024-01-25 ENCOUNTER — LAB VISIT (OUTPATIENT)
Dept: LAB | Facility: HOSPITAL | Age: 69
End: 2024-01-25
Attending: FAMILY MEDICINE
Payer: MEDICARE

## 2024-01-25 ENCOUNTER — OFFICE VISIT (OUTPATIENT)
Dept: UROLOGY | Facility: CLINIC | Age: 69
End: 2024-01-25
Payer: MEDICARE

## 2024-01-25 DIAGNOSIS — C61 PROSTATE CANCER: Primary | ICD-10-CM

## 2024-01-25 DIAGNOSIS — N52.8 OTHER MALE ERECTILE DYSFUNCTION: ICD-10-CM

## 2024-01-25 DIAGNOSIS — E78.1 HYPERTRIGLYCERIDEMIA: ICD-10-CM

## 2024-01-25 DIAGNOSIS — R73.03 PRE-DIABETES: ICD-10-CM

## 2024-01-25 DIAGNOSIS — E29.1 HYPOGONADISM IN MALE: ICD-10-CM

## 2024-01-25 DIAGNOSIS — R35.1 NOCTURIA: ICD-10-CM

## 2024-01-25 DIAGNOSIS — E03.9 HYPOTHYROIDISM, UNSPECIFIED TYPE: ICD-10-CM

## 2024-01-25 LAB
ALBUMIN SERPL BCP-MCNC: 4.1 G/DL (ref 3.5–5.2)
ALP SERPL-CCNC: 79 U/L (ref 55–135)
ALT SERPL W/O P-5'-P-CCNC: 40 U/L (ref 10–44)
ANION GAP SERPL CALC-SCNC: 9 MMOL/L (ref 8–16)
AST SERPL-CCNC: 27 U/L (ref 10–40)
BASOPHILS # BLD AUTO: 0.05 K/UL (ref 0–0.2)
BASOPHILS NFR BLD: 1.2 % (ref 0–1.9)
BILIRUB SERPL-MCNC: 0.7 MG/DL (ref 0.1–1)
BUN SERPL-MCNC: 12 MG/DL (ref 8–23)
CALCIUM SERPL-MCNC: 9.7 MG/DL (ref 8.7–10.5)
CHLORIDE SERPL-SCNC: 107 MMOL/L (ref 95–110)
CHOLEST SERPL-MCNC: 165 MG/DL (ref 120–199)
CHOLEST/HDLC SERPL: 4 {RATIO} (ref 2–5)
CO2 SERPL-SCNC: 25 MMOL/L (ref 23–29)
CREAT SERPL-MCNC: 1 MG/DL (ref 0.5–1.4)
DIFFERENTIAL METHOD BLD: ABNORMAL
EOSINOPHIL # BLD AUTO: 0.3 K/UL (ref 0–0.5)
EOSINOPHIL NFR BLD: 6.7 % (ref 0–8)
ERYTHROCYTE [DISTWIDTH] IN BLOOD BY AUTOMATED COUNT: 12.7 % (ref 11.5–14.5)
EST. GFR  (NO RACE VARIABLE): >60 ML/MIN/1.73 M^2
ESTIMATED AVG GLUCOSE: 103 MG/DL (ref 68–131)
GLUCOSE SERPL-MCNC: 115 MG/DL (ref 70–110)
HBA1C MFR BLD: 5.2 % (ref 4–5.6)
HCT VFR BLD AUTO: 43.1 % (ref 40–54)
HDLC SERPL-MCNC: 41 MG/DL (ref 40–75)
HDLC SERPL: 24.8 % (ref 20–50)
HGB BLD-MCNC: 14.8 G/DL (ref 14–18)
IMM GRANULOCYTES # BLD AUTO: 0.03 K/UL (ref 0–0.04)
IMM GRANULOCYTES NFR BLD AUTO: 0.7 % (ref 0–0.5)
LDLC SERPL CALC-MCNC: 86.6 MG/DL (ref 63–159)
LYMPHOCYTES # BLD AUTO: 1 K/UL (ref 1–4.8)
LYMPHOCYTES NFR BLD: 24.3 % (ref 18–48)
MCH RBC QN AUTO: 31.2 PG (ref 27–31)
MCHC RBC AUTO-ENTMCNC: 34.3 G/DL (ref 32–36)
MCV RBC AUTO: 91 FL (ref 82–98)
MONOCYTES # BLD AUTO: 0.5 K/UL (ref 0.3–1)
MONOCYTES NFR BLD: 12.9 % (ref 4–15)
NEUTROPHILS # BLD AUTO: 2.3 K/UL (ref 1.8–7.7)
NEUTROPHILS NFR BLD: 54.2 % (ref 38–73)
NONHDLC SERPL-MCNC: 124 MG/DL
NRBC BLD-RTO: 0 /100 WBC
PLATELET # BLD AUTO: 215 K/UL (ref 150–450)
PMV BLD AUTO: 9.9 FL (ref 9.2–12.9)
POTASSIUM SERPL-SCNC: 4.5 MMOL/L (ref 3.5–5.1)
PROT SERPL-MCNC: 7.6 G/DL (ref 6–8.4)
RBC # BLD AUTO: 4.75 M/UL (ref 4.6–6.2)
SODIUM SERPL-SCNC: 141 MMOL/L (ref 136–145)
T4 FREE SERPL-MCNC: 1.5 NG/DL (ref 0.71–1.51)
TRIGL SERPL-MCNC: 187 MG/DL (ref 30–150)
TSH SERPL DL<=0.005 MIU/L-ACNC: 0.04 UIU/ML (ref 0.4–4)
WBC # BLD AUTO: 4.2 K/UL (ref 3.9–12.7)

## 2024-01-25 PROCEDURE — 99214 OFFICE O/P EST MOD 30 MIN: CPT | Mod: HCNC,24,S$GLB, | Performed by: UROLOGY

## 2024-01-25 PROCEDURE — 36415 COLL VENOUS BLD VENIPUNCTURE: CPT | Mod: HCNC | Performed by: FAMILY MEDICINE

## 2024-01-25 PROCEDURE — 80053 COMPREHEN METABOLIC PANEL: CPT | Mod: HCNC | Performed by: FAMILY MEDICINE

## 2024-01-25 PROCEDURE — 1101F PT FALLS ASSESS-DOCD LE1/YR: CPT | Mod: HCNC,CPTII,S$GLB, | Performed by: UROLOGY

## 2024-01-25 PROCEDURE — 83036 HEMOGLOBIN GLYCOSYLATED A1C: CPT | Mod: HCNC | Performed by: FAMILY MEDICINE

## 2024-01-25 PROCEDURE — 1160F RVW MEDS BY RX/DR IN RCRD: CPT | Mod: HCNC,CPTII,S$GLB, | Performed by: UROLOGY

## 2024-01-25 PROCEDURE — 85025 COMPLETE CBC W/AUTO DIFF WBC: CPT | Mod: HCNC | Performed by: FAMILY MEDICINE

## 2024-01-25 PROCEDURE — 1159F MED LIST DOCD IN RCRD: CPT | Mod: HCNC,CPTII,S$GLB, | Performed by: UROLOGY

## 2024-01-25 PROCEDURE — 80061 LIPID PANEL: CPT | Mod: HCNC | Performed by: FAMILY MEDICINE

## 2024-01-25 PROCEDURE — 84443 ASSAY THYROID STIM HORMONE: CPT | Mod: HCNC | Performed by: FAMILY MEDICINE

## 2024-01-25 PROCEDURE — 99999 PR PBB SHADOW E&M-EST. PATIENT-LVL III: CPT | Mod: PBBFAC,HCNC,, | Performed by: UROLOGY

## 2024-01-25 PROCEDURE — 84439 ASSAY OF FREE THYROXINE: CPT | Mod: HCNC | Performed by: FAMILY MEDICINE

## 2024-01-25 PROCEDURE — 3288F FALL RISK ASSESSMENT DOCD: CPT | Mod: HCNC,CPTII,S$GLB, | Performed by: UROLOGY

## 2024-01-25 RX ORDER — TAMSULOSIN HYDROCHLORIDE 0.4 MG/1
0.8 CAPSULE ORAL DAILY
Qty: 180 CAPSULE | Refills: 3 | Status: SHIPPED | OUTPATIENT
Start: 2024-01-25 | End: 2025-01-19

## 2024-01-25 RX ORDER — SILDENAFIL 100 MG/1
100 TABLET, FILM COATED ORAL DAILY PRN
Qty: 30 TABLET | Refills: 11 | Status: SHIPPED | OUTPATIENT
Start: 2024-01-25 | End: 2024-06-17

## 2024-01-25 NOTE — PROGRESS NOTES
Subjective:       Patient ID: Anthony Joseph Rockweiler is a 68 y.o. male The patient's last visit with me was on 9/26/2023.     Chief Complaint:   No chief complaint on file.      Prostate Cancer  He underwent a prostate needle biopsy on 5/2/2023.  His biopsy was indicated due to: Elevated PSA.  Afterwards he experienced: Gross Hematuria.  These symptoms have resolved.  His PSA prior to biopsy was 29.5.  PSA Density 0.45. His prostate size was 60 grams.  The ultrasound did not show a median lobe.  He currently does have erectile dysfunction.  His pathology showed: Prostate Cancer.     6/22/2023  He is back with a new PSA, MRI, and PSMA PET scan.    9/26/2023  He met with Dr. Vuong.  They are waiting on his Polaris score.    01/25/2024  He has finished XRT followed by brachytherapy boost.  This concluded on 12/7/2023.  He has noted more hesitancy in the evening.        ACTIVE MEDICAL ISSUES:  Patient Active Problem List   Diagnosis    History of seizures    Headache    Nuclear sclerosis of both eyes    Moderate benzodiazepine use disorder    Prostate cancer       ALLERGIES AND MEDICATIONS: updated and reviewed.  Review of patient's allergies indicates:   Allergen Reactions    Paroxetine      Urinary problems     Current Outpatient Medications   Medication Sig    amLODIPine (NORVASC) 5 MG tablet Take 5 mg by mouth 2 (two) times daily.    carvedilol (COREG) 12.5 MG tablet Take 12.5 mg by mouth 2 (two) times daily with meals.    fluticasone propionate (FLONASE) 50 mcg/actuation nasal spray 1 spray (50 mcg total) by Each Nostril route once daily.    HYDROcodone-acetaminophen (NORCO) 5-325 mg per tablet Take 1 tablet by mouth every 8 (eight) hours as needed for Pain.    levothyroxine (SYNTHROID) 150 MCG tablet Take 1 tablet (150 mcg total) by mouth once daily.    LORazepam (ATIVAN) 1 MG tablet Take 1 tablet (1 mg total) by mouth 2 (two) times daily as needed for Anxiety.    losartan (COZAAR) 100 MG tablet Take 100 mg  by mouth every morning.    pravastatin (PRAVACHOL) 40 MG tablet Take 1 tablet (40 mg total) by mouth once daily. (Patient taking differently: Take 40 mg by mouth every evening.)    sildenafiL (VIAGRA) 100 MG tablet Take 1 tablet (100 mg total) by mouth daily as needed.    tamsulosin (FLOMAX) 0.4 mg Cap Take 2 capsules (0.8 mg total) by mouth once daily.    traMADoL (ULTRAM) 50 mg tablet Take 50 mg by mouth every 6 (six) hours as needed.    valproic acid (DEPAKENE) 250 mg capsule Take 1 capsule (250 mg total) by mouth 4 (four) times daily.     No current facility-administered medications for this visit.       Review of Systems   Constitutional:  Negative for chills and fever.   HENT:  Negative for congestion.    Respiratory:  Negative for chest tightness and shortness of breath.    Cardiovascular:  Negative for chest pain and palpitations.   Gastrointestinal:  Negative for abdominal pain, constipation, diarrhea, nausea and vomiting.   Genitourinary:  Negative for difficulty urinating, dysuria, flank pain, hematuria and urgency.   Musculoskeletal:  Negative for arthralgias.   Neurological:  Negative for dizziness.   Psychiatric/Behavioral:  Negative for confusion.        Objective:      There were no vitals filed for this visit.        Physical Exam  Vitals and nursing note reviewed.   Constitutional:       Appearance: He is well-developed.   HENT:      Head: Normocephalic.   Eyes:      Conjunctiva/sclera: Conjunctivae normal.   Neck:      Thyroid: No thyromegaly.      Trachea: No tracheal deviation.   Cardiovascular:      Rate and Rhythm: Normal rate.      Heart sounds: Normal heart sounds.   Pulmonary:      Effort: Pulmonary effort is normal. No respiratory distress.      Breath sounds: Normal breath sounds. No wheezing.   Abdominal:      General: Bowel sounds are normal.      Palpations: Abdomen is soft.      Tenderness: There is no abdominal tenderness. There is no rebound.      Hernia: No hernia is present.    Musculoskeletal:         General: No tenderness. Normal range of motion.      Cervical back: Normal range of motion and neck supple.   Lymphadenopathy:      Cervical: No cervical adenopathy.   Skin:     General: Skin is warm and dry.      Findings: No erythema or rash.   Neurological:      Mental Status: He is alert and oriented to person, place, and time.   Psychiatric:         Behavior: Behavior normal.         Thought Content: Thought content normal.         Judgment: Judgment normal.         Urine dipstick shows negative for all components.  Micro exam: negative for WBC's or RBC's.    Component Ref Range & Units 1 mo ago  (8/8/23) 1 mo ago  (8/4/23) 3 mo ago  (6/20/23)   PSA Diagnostic 0.00 - 4.00 ng/mL 23.9 High   19.8 High  CM  21.0 High  CM    Comment: The testing method is a chemiluminescent microparticle immunoassay   manufactured by [employer] and performed on the [employer]   or   Adrenaline Mobility system. Values obtained with different assay manufacturers   for   methods may be different and cannot be used interchangeably.   PSA Expected levels:   Hormonal Therapy: <0.05 ng/ml   Prostatectomy: <0.01 ng/ml   Radiation Therapy: <1.00 ng/ml    Resulting Agency  [facility]              Narrative  Performed by: [facility]  PSA 3 months      Specimen Collected: 08/08/23 07:58 Last Resulted: 08/08/23 15:58             MRI Prostate W W/O Contrast  Order: [id number]  Status: Final result     Visible to patient: Yes (seen)     Next appt: Today at 08:30 AM in Urology (José Luis Foley MD)     Dx: Prostate cancer     0 Result Notes  Details    Reading Physician Reading Date Result Priority   Andriy Mathur MD  [phone] 6/20/2023 Routine     Narrative & Impression  EXAMINATION:  MRI PROSTATE W W/O CONTRAST     CLINICAL HISTORY:  Prostate cancer, staging;  Malignant neoplasm of prostate     Additional history: None provided.     TECHNIQUE:  Multiparametric MRI of the prostate/pelvis performed on a 3T  scanner with phase pelvic coil. Multiplanar, multisequence images including high resolution, small field-of-view T2-WI; axial diffusion weighted images with multiple B-values and creation of ADC-maps; and dynamic contrast enhanced T1-weighted images through the prostate were obtained before, during, and after the administration of 10 cc intravenous gadolinium.     COMPARISON:  PET-CT 06/19/2023     FINDINGS:  Previous biopsy: Citlali 6 right mid gland, 05/02/2023     PSA: 29.5 ng/mL 02/01/2023     Prior therapy: None     Prostate: 5.0 x 4.1 x 4.7 cm corresponding to a computed volume of 47 cc.     Peripheral zone: Focal lesion, as below.  Additional superimposed diffuse T2 signal hypointensity.     Lesion (MICHELLE) #P-1     Location: Side: Right; Region: mid; Zone: posterior peripheral zone laterally     Greatest dimension: 1.6 cm     T2-WI: Same as 4 but ?1.5 cm in greatest dimension or definite extraprostatic extension/invasive behavior, score 5.     DWI/ADC: Same as 4 but ?1.5 cm in greatest dimension or definite extraprostatic extension/invasive behavior, score 5.     DCE: Positive     Extraprostatic extension: Negative     PI-RADS assessment category: 5     Transitional zone: Focal lesion, as below.  Additional BPH nodules.     Lesion (MICHELLE) #T-1     Location: Side:Midline; Region: Base and mid; Zone: anterior     Greatest dimension: 1.9 cm     T2-WI: Heterogeneous signal intensity withobscured margins, score 3.     DWI/ADC: Focal (discrete and different from the background) hypointense on ADC and/or focal hyperintense on high b-value DWI; may be markedly hypointense on ADC or markedly hyperintense on high b-value DWI, but not both, score 3.     DCE: Positive     Extraprostatic extension: Negative     PI-RADS assessment category: 3     Neurovascular bundle: Normal appearance.     Seminal vesicles: Normal appearance.     Adjacent Organ Involvement: No evidence for urinary bladder or rectal invasion.      Lymphadenopathy: None.     Other Findings: Mild diffuse bladder wall thickening.  Colonic diverticulosis.  Sclerotic focus within the right femur which could represent a bone island, no corresponding radiotracer avidity identified on prior PSMA PET.     Impression:     1. PI-RADS 5 lesion within the right mid posterior peripheral zone laterally.  No evidence of extracapsular extension.  2. PI-RADS 3 lesion within the midline base and mid anterior transition zone.  No evidence of extracapsular extension.  Overall Assessment: PI-RADS 5 - Very high (clinically significant cancer is highly likely to be present)     Number of targets created for potential MR/US fusion biopsy     Peripheral zone: 1     Transition zone: 1        Electronically signed by: Andriy Mathur  Date:                                            06/20/2023  Time:                                           06:59   NM PET CT F 18 PYL PSMA, Midthigh to Vertex  Order: [id number]  Status: Final result     Visible to patient: Yes (not seen)     Next appt: Today at 08:30 AM in Urology (José Luis Foley MD)     Dx: Prostate cancer     1 Result Note    1 Patient Communication  Details    Reading Physician Reading Date Result Priority   Aman Eckert IV, MD  [phone] 6/19/2023 Routine   Ramiro Sue MD  [phone]  [phone] 6/19/2023      Narrative & Impression  EXAMINATION:  NM PET CT F 18 PYL PSMA, MIDTHIGH TO VERTEX     CLINICAL HISTORY:  Malignant neoplasm of prostate     TECHNIQUE:  Approximately 60 minutes following the intravenous injection of 9.69 mCi F-18 piflufolastat, PET images were acquired from the proximal thighs to the skull vertex. CT images were also acquired for attenuation correction and anatomic localization and performed without oral or IV contrast.     COMPARISON:  Outside report CT head 04/04/2019     FINDINGS:  In the head and neck, there is physiologic uptake in the lacrimal and salivary glands, and there are no  tracer avid lesions suspicious for malignancy.     In the chest, there are upper limit of normal sized bilateral axillary lymph nodes with low level tracer uptake, relatively symmetric and nonspecific.  No definite abnormal uptake suspicious for malignancy.     In the abdomen and pelvis, there is physiologic distribution in the liver, spleen, bowel, and genitourinary tract, and there are no tracer avid lesions suspicious for malignancy.     There is focal increased radiotracer uptake within the central aspect of the prostate with SUV max of 4.9 (image 54), findings may be confounded by tracer in the prostatic urethra.     In the bones, there is physiologic uptake in the bone marrow, and there are no tracer avid lesions suspicious for malignancy.     Additional CT findings: Remote appearing inferior left frontal lobe encephalomalacia, recommend correlation with clinical history.  Thyroid is likely surgically absent.  Bibasilar dependent atelectasis.  Moderate to large hiatal hernia.  Prostate is mildly enlarged with dystrophic calcifications.  Diverticulosis coli.  Small fat containing umbilical hernia.  Mild aortoiliac atherosclerosis.     Impression:     In this patient with biopsy-proven prostate cancer, there are no focal suspicious tracer avid lesions to indicate metastatic disease.     Additional findings as above, including moderate to large sized hiatal hernia.     Electronically signed by resident: Ramiro Sue  Date:                                            06/19/2023  Time:                                           14:43     Electronically signed by: Aman Eckert  Date:                                            06/19/2023  Time:                                           16:53       Collected: 05/02/23 1119   Result status: Final   Resulting lab: [facility]   Value: 1. Prostate, left base lateral, biopsy:   Benign prostatic tissue     2. Prostate, left base medial,  biopsy:   Benign prostatic tissue     3. Prostate, left mid lateral, biopsy:   Benign prostatic tissue     4. Prostate, left mid medial, biopsy:   Benign prostatic tissue     5. Prostate, left apex lateral, biopsy:   Atypical small acinar proliferation (ASAP)     6. Prostate, left apex medial, biopsy:   Benign prostatic tissue     7. Prostate, right base lateral, biopsy:   Benign fibrous tissue     8. Prostate, right base medial, biopsy:   Benign prostatic tissue     9. Prostate, right mid lateral, biopsy:   Benign prostatic tissue     10. Prostate, right mid medial, biopsy:   Prostatic adenocarcinoma, Grade Group 1, Citlali score (3+3)=6   The tumor is present in 1/1 cores   The tumor measures 1 mm in length, approximately 11% of total prostatic tissue   No perineural invasion identified     11. Prostate, right apex lateral, biopsy:   Benign prostatic tissue     12. Prostate, right apex medial, biopsy:   Benign prostatic tissue    Comment: Interp By Jessica Granados D.O., Signed on 05/05/2023 at 13:34       Assessment:       1. Prostate cancer    2. Other male erectile dysfunction    3. Nocturia    4. Hypogonadism in male                Plan:       1. Prostate cancer    - Prostate Specific Antigen, Diagnostic; Future    2. Other male erectile dysfunction    - sildenafiL (VIAGRA) 100 MG tablet; Take 1 tablet (100 mg total) by mouth daily as needed.  Dispense: 30 tablet; Refill: 11    3. Nocturia  Increase to 0.8 mg    - tamsulosin (FLOMAX) 0.4 mg Cap; Take 2 capsules (0.8 mg total) by mouth once daily.  Dispense: 180 capsule; Refill: 3    4. Hypogonadism in male  Hold for now             Follow up in about 3 months (around 4/25/2024) for Follow up Established, Review PSA.

## 2024-02-19 ENCOUNTER — OFFICE VISIT (OUTPATIENT)
Dept: FAMILY MEDICINE | Facility: CLINIC | Age: 69
End: 2024-02-19
Payer: MEDICARE

## 2024-02-19 ENCOUNTER — LAB VISIT (OUTPATIENT)
Dept: LAB | Facility: HOSPITAL | Age: 69
End: 2024-02-19
Attending: RADIOLOGY
Payer: MEDICARE

## 2024-02-19 VITALS
WEIGHT: 175.38 LBS | HEART RATE: 65 BPM | OXYGEN SATURATION: 97 % | SYSTOLIC BLOOD PRESSURE: 110 MMHG | HEIGHT: 68 IN | DIASTOLIC BLOOD PRESSURE: 82 MMHG | BODY MASS INDEX: 26.58 KG/M2 | TEMPERATURE: 98 F

## 2024-02-19 DIAGNOSIS — C61 PROSTATE CANCER: ICD-10-CM

## 2024-02-19 DIAGNOSIS — F13.20 MODERATE BENZODIAZEPINE USE DISORDER: ICD-10-CM

## 2024-02-19 DIAGNOSIS — Z87.820 HISTORY OF TRAUMATIC BRAIN INJURY: ICD-10-CM

## 2024-02-19 DIAGNOSIS — Z87.898 HISTORY OF PREDIABETES: ICD-10-CM

## 2024-02-19 DIAGNOSIS — Z87.898 HISTORY OF SEIZURES: ICD-10-CM

## 2024-02-19 DIAGNOSIS — I10 ESSENTIAL HYPERTENSION: ICD-10-CM

## 2024-02-19 DIAGNOSIS — E78.1 HYPERTRIGLYCERIDEMIA: Primary | ICD-10-CM

## 2024-02-19 DIAGNOSIS — E05.90 SUBCLINICAL HYPERTHYROIDISM: ICD-10-CM

## 2024-02-19 LAB — COMPLEXED PSA SERPL-MCNC: 4.2 NG/ML (ref 0–4)

## 2024-02-19 PROCEDURE — 3288F FALL RISK ASSESSMENT DOCD: CPT | Mod: HCNC,CPTII,S$GLB, | Performed by: FAMILY MEDICINE

## 2024-02-19 PROCEDURE — 99214 OFFICE O/P EST MOD 30 MIN: CPT | Mod: HCNC,S$GLB,, | Performed by: FAMILY MEDICINE

## 2024-02-19 PROCEDURE — 99999 PR PBB SHADOW E&M-EST. PATIENT-LVL V: CPT | Mod: PBBFAC,HCNC,, | Performed by: FAMILY MEDICINE

## 2024-02-19 PROCEDURE — 3079F DIAST BP 80-89 MM HG: CPT | Mod: HCNC,CPTII,S$GLB, | Performed by: FAMILY MEDICINE

## 2024-02-19 PROCEDURE — 3074F SYST BP LT 130 MM HG: CPT | Mod: HCNC,CPTII,S$GLB, | Performed by: FAMILY MEDICINE

## 2024-02-19 PROCEDURE — 1160F RVW MEDS BY RX/DR IN RCRD: CPT | Mod: HCNC,CPTII,S$GLB, | Performed by: FAMILY MEDICINE

## 2024-02-19 PROCEDURE — 84153 ASSAY OF PSA TOTAL: CPT | Mod: HCNC | Performed by: RADIOLOGY

## 2024-02-19 PROCEDURE — 3044F HG A1C LEVEL LT 7.0%: CPT | Mod: HCNC,CPTII,S$GLB, | Performed by: FAMILY MEDICINE

## 2024-02-19 PROCEDURE — 1159F MED LIST DOCD IN RCRD: CPT | Mod: HCNC,CPTII,S$GLB, | Performed by: FAMILY MEDICINE

## 2024-02-19 PROCEDURE — 36415 COLL VENOUS BLD VENIPUNCTURE: CPT | Mod: HCNC,PO | Performed by: RADIOLOGY

## 2024-02-19 PROCEDURE — 1101F PT FALLS ASSESS-DOCD LE1/YR: CPT | Mod: HCNC,CPTII,S$GLB, | Performed by: FAMILY MEDICINE

## 2024-02-19 PROCEDURE — 1126F AMNT PAIN NOTED NONE PRSNT: CPT | Mod: HCNC,CPTII,S$GLB, | Performed by: FAMILY MEDICINE

## 2024-02-19 PROCEDURE — 3008F BODY MASS INDEX DOCD: CPT | Mod: HCNC,CPTII,S$GLB, | Performed by: FAMILY MEDICINE

## 2024-02-19 RX ORDER — BENZONATATE 200 MG/1
1 CAPSULE ORAL 3 TIMES DAILY PRN
COMMUNITY
End: 2024-06-17

## 2024-02-19 RX ORDER — DEXAMETHASONE SODIUM PHOSPHATE 4 MG/ML
INJECTION, SOLUTION INTRA-ARTICULAR; INTRALESIONAL; INTRAMUSCULAR; INTRAVENOUS; SOFT TISSUE
COMMUNITY
Start: 2023-12-07 | End: 2024-02-26

## 2024-02-19 RX ORDER — ROSUVASTATIN CALCIUM 20 MG/1
20 TABLET, COATED ORAL DAILY
Qty: 30 TABLET | Refills: 11 | Status: SHIPPED | OUTPATIENT
Start: 2024-02-19 | End: 2025-02-18

## 2024-02-19 RX ORDER — CEFAZOLIN 2 G/1
INJECTION, POWDER, FOR SOLUTION INTRAMUSCULAR; INTRAVENOUS
COMMUNITY
Start: 2023-12-07 | End: 2024-02-26

## 2024-02-19 RX ORDER — AMITRIPTYLINE HYDROCHLORIDE 10 MG/1
10 TABLET, FILM COATED ORAL NIGHTLY
COMMUNITY
Start: 2024-01-30 | End: 2024-02-26

## 2024-02-19 RX ORDER — SODIUM CHLORIDE, SODIUM LACTATE, POTASSIUM CHLORIDE, CALCIUM CHLORIDE 600; 310; 30; 20 MG/100ML; MG/100ML; MG/100ML; MG/100ML
INJECTION, SOLUTION INTRAVENOUS
COMMUNITY
Start: 2023-12-07 | End: 2024-02-26

## 2024-02-19 RX ORDER — IBUPROFEN 800 MG/1
800 TABLET ORAL EVERY 8 HOURS
COMMUNITY
Start: 2024-01-29

## 2024-02-19 RX ORDER — ONDANSETRON HYDROCHLORIDE 2 MG/ML
INJECTION, SOLUTION INTRAVENOUS
COMMUNITY
Start: 2023-12-07

## 2024-02-19 NOTE — PROGRESS NOTES
Assessment & Plan:    Hypertriglyceridemia  -     Lipid Panel; Future; Expected date: 02/19/2024  -     rosuvastatin (CRESTOR) 20 MG tablet; Take 1 tablet (20 mg total) by mouth once daily.  Dispense: 30 tablet; Refill: 11    Labs reviewed. D/C pravastatin and switch to rosuvastatin 20 mg.   Repeat lipid panel in 6 mo.    Subclinical hyperthyroidism  -     TSH; Future; Expected date: 02/19/2024  -     T4, Free; Future; Expected date: 02/19/2024    Thyroid studies stable. Continue Synthroid and monitoring thyroid studies in 6 mo.    History of prediabetes  -     Hemoglobin A1C; Future; Expected date: 02/19/2024    A1c in normal range.    Essential hypertension  -     CBC Auto Differential; Future; Expected date: 02/19/2024  -     Comprehensive Metabolic Panel; Future; Expected date: 02/19/2024  -     Hemoglobin A1C; Future; Expected date: 02/19/2024  -     Lipid Panel; Future; Expected date: 02/19/2024    Controlled. Continue current therapy.     Moderate benzodiazepine use disorder  History of traumatic brain injury  History of seizures    Stable. Patient being monitored by Neurology.     Declines all vaccines.     Follow-up: Follow up in about 6 months (around 8/19/2024).  ______________________________________________________________________    Chief Complaint  Chief Complaint   Patient presents with    Health Maintenance       HPI  Junaid Joseph Rockweiler is a 68 y.o. male with medical diagnoses as listed in the medical history and problem list that presents to the office to follow up on his chronic conditions. He does not report any new concerns today. He generally feels well on his current dose of Synthroid.     Most recent pertinent workup:     Last CBC Results:   Lab Results   Component Value Date    WBC 4.20 01/25/2024    HGB 14.8 01/25/2024    HCT 43.1 01/25/2024     01/25/2024       Last CMP Results  Lab Results   Component Value Date     01/25/2024    K 4.5 01/25/2024     01/25/2024     CO2 25 01/25/2024    BUN 12 01/25/2024    CREATININE 1.0 01/25/2024    CALCIUM 9.7 01/25/2024    ALBUMIN 4.1 01/25/2024    AST 27 01/25/2024    ALT 40 01/25/2024       Last Lipids  Lab Results   Component Value Date    CHOL 165 01/25/2024    TRIG 187 (H) 01/25/2024    HDL 41 01/25/2024    LDLCALC 86.6 01/25/2024   The 10-year ASCVD risk score (Jasbir PIMENTEL, et al., 2019) is: 12.1%    Values used to calculate the score:      Age: 68 years      Sex: Male      Is Non- : No      Diabetic: No      Tobacco smoker: No      Systolic Blood Pressure: 110 mmHg      Is BP treated: Yes      HDL Cholesterol: 41 mg/dL      Total Cholesterol: 165 mg/dL      Last A1C  Lab Results   Component Value Date    HGBA1C 5.2 01/25/2024         Health Maintenance         Date Due Completion Date    Pneumococcal Vaccines (Age 65+) (1 of 2 - PCV) Never done ---    Shingles Vaccine (1 of 2) Never done ---    TETANUS VACCINE 09/09/2015 9/9/2005    RSV Vaccine (Age 60+ and Pregnant patients) (1 - 1-dose 60+ series) Never done ---    Influenza Vaccine (1) Never done ---    COVID-19 Vaccine (4 - 2023-24 season) 09/01/2023 1/28/2022    Hemoglobin A1c (Prediabetes) 01/25/2025 1/25/2024    Colorectal Cancer Screening 10/02/2027 10/2/2017    Lipid Panel 01/25/2029 1/25/2024              PAST MEDICAL HISTORY:  Past Medical History:   Diagnosis Date    Headache     migraines    Hx of psychiatric care     Hypertension     Prostate cancer     Psychiatric problem     Seizures     Therapy        PAST SURGICAL HISTORY:  Past Surgical History:   Procedure Laterality Date    PROSTATE BIOPSY  05/02/2023    RADIOACTIVE SEED IMPLANTATION N/A 12/7/2023    Procedure: INSERTION, RADIOACTIVE SEED;  Surgeon: Deon Guzman MD;  Location: [address];  Service: Urology;  Laterality: N/A;    TRANSRECTAL ULTRASOUND EXAMINATION N/A 12/7/2023    Procedure: ULTRASOUND, RECTAL APPROACH;  Surgeon: Deon Guzman MD;  Location: [facility]  [facility];  Service: Urology;  Laterality: N/A;       SOCIAL HISTORY:  Social History     Socioeconomic History    Marital status:     Number of children: 2   Tobacco Use    Smoking status: Never    Smokeless tobacco: Never   Substance and Sexual Activity    Alcohol use: No    Drug use: No    Sexual activity: Yes     Partners: Female     Birth control/protection: Condom     Social Determinants of Health     Financial Resource Strain: Low Risk  (7/12/2023)    Overall Financial Resource Strain (CARDIA)     Difficulty of Paying Living Expenses: Not hard at all   Food Insecurity: No Food Insecurity (7/12/2023)    Hunger Vital Sign     Worried About Running Out of Food in the Last Year: Never true     Ran Out of Food in the Last Year: Never true   Transportation Needs: No Transportation Needs (7/12/2023)    PRAPARE - Transportation     Lack of Transportation (Medical): No     Lack of Transportation (Non-Medical): No   Physical Activity: Sufficiently Active (7/12/2023)    Exercise Vital Sign     Days of Exercise per Week: 6 days     Minutes of Exercise per Session: 50 min   Stress: Stress Concern Present (7/12/2023)    [employer] - Occupational Stress Questionnaire     Feeling of Stress : Very much   Social Connections: Socially Integrated (7/12/2023)    Social Connection and Isolation Panel [NHANES]     Frequency of Communication with Friends and Family: More than three times a week     Frequency of Social Gatherings with Friends and Family: More than three times a week     Attends [employer] Services: More than 4 times per year     Active Member of Clubs or Organizations: Yes     Attends Club or Organization Meetings: More than 4 times per year     Marital Status:    Housing Stability: Low Risk  (7/12/2023)    Housing Stability Vital Sign     Unable to Pay for Housing in the Last Year: No     Number of Places Lived in the Last Year: 1     Unstable Housing in the Last Year: No        FAMILY HISTORY:  Family History   Problem Relation Age of Onset    No Known Problems Mother     No Known Problems Father     No Known Problems Sister     No Known Problems Brother     No Known Problems Maternal Aunt     No Known Problems Maternal Uncle     No Known Problems Paternal Aunt     No Known Problems Paternal Uncle     No Known Problems Maternal Grandmother     No Known Problems Maternal Grandfather     No Known Problems Paternal Grandmother     No Known Problems Paternal Grandfather     Suicide Other     Dementia Neg Hx        ALLERGIES AND MEDICATIONS: updated and reviewed.  Review of patient's allergies indicates:   Allergen Reactions    Paroxetine      Urinary problems     Current Outpatient Medications   Medication Sig Dispense Refill    amitriptyline (ELAVIL) 10 MG tablet Take 10 mg by mouth every evening.      amLODIPine (NORVASC) 5 MG tablet Take 5 mg by mouth 2 (two) times daily.  3    benzonatate (TESSALON) 200 MG capsule Take 1 capsule by mouth 3 times daily as needed.      carvedilol (COREG) 12.5 MG tablet Take 12.5 mg by mouth 2 (two) times daily with meals.      ceFAZolin 2 gram SolR       dexAMETHasone (DECADRON) 4 mg/mL injection       fluticasone propionate (FLONASE) 50 mcg/actuation nasal spray 1 spray (50 mcg total) by Each Nostril route once daily. 16 g 0    HYDROcodone-acetaminophen (NORCO) 5-325 mg per tablet Take 1 tablet by mouth every 8 (eight) hours as needed for Pain. 6 tablet 0     mg tablet Take 800 mg by mouth every 8 (eight) hours.      levothyroxine (SYNTHROID) 150 MCG tablet Take 1 tablet (150 mcg total) by mouth once daily. 30 tablet 11    LORazepam (ATIVAN) 1 MG tablet Take 1 tablet (1 mg total) by mouth 2 (two) times daily as needed for Anxiety. 60 tablet 2    losartan (COZAAR) 100 MG tablet Take 100 mg by mouth every morning.      ondansetron 4 mg/2 mL Soln       Ringer's solution,lactated (LACTATED RINGERS) infusion       sildenafiL (VIAGRA) 100 MG tablet  "Take 1 tablet (100 mg total) by mouth daily as needed. 30 tablet 11    tamsulosin (FLOMAX) 0.4 mg Cap Take 2 capsules (0.8 mg total) by mouth once daily. 180 capsule 3    valproic acid (DEPAKENE) 250 mg capsule Take 1 capsule (250 mg total) by mouth 4 (four) times daily. 120 capsule 11    rosuvastatin (CRESTOR) 20 MG tablet Take 1 tablet (20 mg total) by mouth once daily. 30 tablet 11    traMADoL (ULTRAM) 50 mg tablet Take 50 mg by mouth every 6 (six) hours as needed.       No current facility-administered medications for this visit.         ROS  Review of Systems   Constitutional:  Negative for activity change, appetite change and unexpected weight change.   Endocrine: Negative for heat intolerance.   Psychiatric/Behavioral:  The patient is not nervous/anxious.            Physical Exam  Vitals:    02/19/24 1030   BP: 110/82   BP Location: Right arm   Patient Position: Sitting   BP Method: Medium (Manual)   Pulse: 65   Temp: 97.8 °F (36.6 °C)   TempSrc: Oral   SpO2: 97%   Weight: 79.5 kg (175 lb 6 oz)   Height: 5' 8" (1.727 m)    Body mass index is 26.67 kg/m².  Weight: 79.5 kg (175 lb 6 oz)   Height: 5' 8" (172.7 cm)   Physical Exam  Constitutional:       General: He is not in acute distress.     Appearance: Normal appearance.   HENT:      Head: Normocephalic and atraumatic.   Neck:      Thyroid: No thyromegaly.      Vascular: No carotid bruit.   Cardiovascular:      Rate and Rhythm: Normal rate and regular rhythm.      Pulses: Normal pulses.      Heart sounds: Normal heart sounds.   Pulmonary:      Effort: Pulmonary effort is normal. No respiratory distress.      Breath sounds: Normal breath sounds.   Musculoskeletal:      Cervical back: Neck supple.      Right lower leg: No edema.      Left lower leg: No edema.   Lymphadenopathy:      Cervical: No cervical adenopathy.   Skin:     General: Skin is warm and dry.      Findings: No rash.   Neurological:      General: No focal deficit present.      Mental Status: He " is alert and oriented to person, place, and time.   Psychiatric:         Mood and Affect: Mood normal.         Behavior: Behavior normal.         Thought Content: Thought content normal.

## 2024-02-26 ENCOUNTER — OFFICE VISIT (OUTPATIENT)
Dept: RADIATION ONCOLOGY | Facility: CLINIC | Age: 69
End: 2024-02-26
Attending: RADIOLOGY
Payer: MEDICARE

## 2024-02-26 VITALS
HEART RATE: 72 BPM | RESPIRATION RATE: 16 BRPM | SYSTOLIC BLOOD PRESSURE: 153 MMHG | HEIGHT: 68 IN | BODY MASS INDEX: 27.11 KG/M2 | WEIGHT: 178.88 LBS | DIASTOLIC BLOOD PRESSURE: 88 MMHG | OXYGEN SATURATION: 98 %

## 2024-02-26 DIAGNOSIS — C61 PROSTATE CANCER: Primary | ICD-10-CM

## 2024-02-26 PROCEDURE — 1160F RVW MEDS BY RX/DR IN RCRD: CPT | Mod: HCNC,CPTII,S$GLB, | Performed by: RADIOLOGY

## 2024-02-26 PROCEDURE — 3077F SYST BP >= 140 MM HG: CPT | Mod: HCNC,CPTII,S$GLB, | Performed by: RADIOLOGY

## 2024-02-26 PROCEDURE — 3044F HG A1C LEVEL LT 7.0%: CPT | Mod: HCNC,CPTII,S$GLB, | Performed by: RADIOLOGY

## 2024-02-26 PROCEDURE — 1101F PT FALLS ASSESS-DOCD LE1/YR: CPT | Mod: HCNC,CPTII,S$GLB, | Performed by: RADIOLOGY

## 2024-02-26 PROCEDURE — 1126F AMNT PAIN NOTED NONE PRSNT: CPT | Mod: HCNC,CPTII,S$GLB, | Performed by: RADIOLOGY

## 2024-02-26 PROCEDURE — 3288F FALL RISK ASSESSMENT DOCD: CPT | Mod: HCNC,CPTII,S$GLB, | Performed by: RADIOLOGY

## 2024-02-26 PROCEDURE — 1159F MED LIST DOCD IN RCRD: CPT | Mod: HCNC,CPTII,S$GLB, | Performed by: RADIOLOGY

## 2024-02-26 PROCEDURE — 3008F BODY MASS INDEX DOCD: CPT | Mod: HCNC,CPTII,S$GLB, | Performed by: RADIOLOGY

## 2024-02-26 PROCEDURE — 99212 OFFICE O/P EST SF 10 MIN: CPT | Mod: HCNC,S$GLB,, | Performed by: RADIOLOGY

## 2024-02-26 PROCEDURE — 3079F DIAST BP 80-89 MM HG: CPT | Mod: HCNC,CPTII,S$GLB, | Performed by: RADIOLOGY

## 2024-02-26 PROCEDURE — 99999 PR PBB SHADOW E&M-EST. PATIENT-LVL IV: CPT | Mod: PBBFAC,HCNC,, | Performed by: RADIOLOGY

## 2024-02-26 NOTE — PROGRESS NOTES
[facility] - Radiation Oncology     Patient ID: Anthony Joseph Rockweiler is a 68 y.o. male.    Chief Complaint: Prostate Cancer (2 mo s/p brachytherapy)    Mr. Rockweiler has a history of Stage IIIA (T2a, N0, M0, PSA > 20 GG1) prostate cancer  He presented with a  screening PSA of 29.5 ng/ml in February of 2023.  He admitted to using weekly testosterone cypionate for several years.  Biopsies on 5/2/23 revealed [place] 6 (3+3) adenocarcinoma involving 11% of the core from the Rt. medial mid gland.  The remaining 11 cores revealed benign prostatic tissue. PSMA scan revealed increased uptake in the central aspect of the prostate, but no evidence of regional or distant metastatic disease.  MRI scan revealed a 47 cc  prostate with a 1.6 PI-RADS 5 lesion in the peripheral Rt. mid gland, with no extraprostatic extension.  There was a 1.9 cm PI-RADS 3 lesion in the base mid transitional zone, with no extraprostatic extension. Polaris indicated single modality therapy.  We discussed a number of options including rebiopsy.  The patient elected to proceed with radiotherapy.  He completed 46 Gy to the prostate with 50 Gy to the seminal vesicles. On 12/7/23 the patient underwent transperneal implantation of the prostate gland using Iodine 125 seeds.        Review of Systems   Constitutional:  Negative for activity change, appetite change, chills and fatigue.   Gastrointestinal:  Negative for abdominal pain, constipation and diarrhea.   Genitourinary:  Negative for bladder incontinence, difficulty urinating, dysuria, frequency and hematuria.        Continues on tamsulosin,  nocturia 1 - 2 times per night.       Physical Exam  Constitutional:       General: He is not in acute distress.     Appearance: Normal appearance.   Abdominal:      General: Abdomen is flat. There is no distension.   Neurological:      Mental Status: He is alert and oriented to person, place, and time.   Psychiatric:         Mood and  Affect: Mood normal.         Judgment: Judgment normal.          Latest Reference Range & Units 08/08/23 07:58 02/19/24 11:37   PSA Diagnostic 0.00 - 4.00 ng/mL 23.9 (H) 4.2 (H)   (H): Data is abnormally high       Assessment and Plan    Prostate cancer  - Doing  well excellent response to date.  Plan follow up in 6 months with PSA

## 2024-04-22 ENCOUNTER — LAB VISIT (OUTPATIENT)
Dept: LAB | Facility: HOSPITAL | Age: 69
End: 2024-04-22
Attending: UROLOGY
Payer: MEDICARE

## 2024-04-22 DIAGNOSIS — C61 PROSTATE CANCER: ICD-10-CM

## 2024-04-22 LAB — COMPLEXED PSA SERPL-MCNC: 2.6 NG/ML (ref 0–4)

## 2024-04-22 PROCEDURE — 36415 COLL VENOUS BLD VENIPUNCTURE: CPT | Mod: PO | Performed by: UROLOGY

## 2024-04-22 PROCEDURE — 84153 ASSAY OF PSA TOTAL: CPT | Performed by: UROLOGY

## 2024-04-25 ENCOUNTER — OFFICE VISIT (OUTPATIENT)
Dept: UROLOGY | Facility: CLINIC | Age: 69
End: 2024-04-25
Payer: MEDICARE

## 2024-04-25 VITALS — WEIGHT: 178.56 LBS | BODY MASS INDEX: 27.15 KG/M2

## 2024-04-25 DIAGNOSIS — N52.8 OTHER MALE ERECTILE DYSFUNCTION: ICD-10-CM

## 2024-04-25 DIAGNOSIS — C61 PROSTATE CANCER: Primary | ICD-10-CM

## 2024-04-25 DIAGNOSIS — R35.1 NOCTURIA: ICD-10-CM

## 2024-04-25 DIAGNOSIS — E29.1 HYPOGONADISM IN MALE: ICD-10-CM

## 2024-04-25 PROCEDURE — 1126F AMNT PAIN NOTED NONE PRSNT: CPT | Mod: CPTII,S$GLB,, | Performed by: UROLOGY

## 2024-04-25 PROCEDURE — 1160F RVW MEDS BY RX/DR IN RCRD: CPT | Mod: CPTII,S$GLB,, | Performed by: UROLOGY

## 2024-04-25 PROCEDURE — 3288F FALL RISK ASSESSMENT DOCD: CPT | Mod: CPTII,S$GLB,, | Performed by: UROLOGY

## 2024-04-25 PROCEDURE — 1101F PT FALLS ASSESS-DOCD LE1/YR: CPT | Mod: CPTII,S$GLB,, | Performed by: UROLOGY

## 2024-04-25 PROCEDURE — 99999 PR PBB SHADOW E&M-EST. PATIENT-LVL III: CPT | Mod: PBBFAC,,, | Performed by: UROLOGY

## 2024-04-25 PROCEDURE — 3008F BODY MASS INDEX DOCD: CPT | Mod: CPTII,S$GLB,, | Performed by: UROLOGY

## 2024-04-25 PROCEDURE — 99214 OFFICE O/P EST MOD 30 MIN: CPT | Mod: S$GLB,,, | Performed by: UROLOGY

## 2024-04-25 PROCEDURE — 1159F MED LIST DOCD IN RCRD: CPT | Mod: CPTII,S$GLB,, | Performed by: UROLOGY

## 2024-04-25 PROCEDURE — 3044F HG A1C LEVEL LT 7.0%: CPT | Mod: CPTII,S$GLB,, | Performed by: UROLOGY

## 2024-04-25 NOTE — PROGRESS NOTES
Subjective:       Patient ID: Anthony Joseph Rockweiler is a 69 y.o. male The patient's last visit with me was on 1/25/2024.     Chief Complaint:   No chief complaint on file.      Prostate Cancer  He underwent a prostate needle biopsy on 5/2/2023.  His biopsy was indicated due to: Elevated PSA.  Afterwards he experienced: Gross Hematuria.  These symptoms have resolved.  His PSA prior to biopsy was 29.5.  PSA Density 0.45. His prostate size was 60 grams.  The ultrasound did not show a median lobe.  He currently does have erectile dysfunction.  His pathology showed: Prostate Cancer.     6/22/2023  He is back with a new PSA, MRI, and PSMA PET scan.    9/26/2023  He met with Dr. Vuong.  They are waiting on his Polaris score.    1/25/2024  He has finished XRT followed by brachytherapy boost.  This concluded on 12/7/2023.  He has noted more hesitancy in the evening.    04/25/2024  He is doing okay.  He is working out, but is inquiring about timing of TRT.  He feels his mental and physical health is much better on TRT.        ACTIVE MEDICAL ISSUES:  Patient Active Problem List   Diagnosis    History of seizures    Headache    Nuclear sclerosis of both eyes    Moderate benzodiazepine use disorder    Prostate cancer       ALLERGIES AND MEDICATIONS: updated and reviewed.  Review of patient's allergies indicates:   Allergen Reactions    Paroxetine      Urinary problems     Current Outpatient Medications   Medication Sig Dispense Refill    amLODIPine (NORVASC) 5 MG tablet Take 5 mg by mouth 2 (two) times daily.  3    benzonatate (TESSALON) 200 MG capsule Take 1 capsule by mouth 3 times daily as needed.      carvedilol (COREG) 12.5 MG tablet Take 12.5 mg by mouth 2 (two) times daily with meals.      fluticasone propionate (FLONASE) 50 mcg/actuation nasal spray 1 spray (50 mcg total) by Each Nostril route once daily. 16 g 0     mg tablet Take 800 mg by mouth every 8 (eight) hours.      levothyroxine (SYNTHROID) 150  MCG tablet Take 1 tablet (150 mcg total) by mouth once daily. 30 tablet 11    LORazepam (ATIVAN) 1 MG tablet Take 1 tablet (1 mg total) by mouth 2 (two) times daily as needed for Anxiety. 60 tablet 2    losartan (COZAAR) 100 MG tablet Take 100 mg by mouth every morning.      ondansetron 4 mg/2 mL Soln       rosuvastatin (CRESTOR) 20 MG tablet Take 1 tablet (20 mg total) by mouth once daily. 30 tablet 11    sildenafiL (VIAGRA) 100 MG tablet Take 1 tablet (100 mg total) by mouth daily as needed. 30 tablet 11    tamsulosin (FLOMAX) 0.4 mg Cap Take 2 capsules (0.8 mg total) by mouth once daily. 180 capsule 3    valproic acid (DEPAKENE) 250 mg capsule Take 1 capsule (250 mg total) by mouth 4 (four) times daily. 120 capsule 11     No current facility-administered medications for this visit.       Review of Systems   Constitutional:  Negative for chills and fever.   HENT:  Negative for congestion.    Respiratory:  Negative for chest tightness and shortness of breath.    Cardiovascular:  Negative for chest pain and palpitations.   Gastrointestinal:  Negative for abdominal pain, constipation, diarrhea, nausea and vomiting.   Genitourinary:  Negative for difficulty urinating, dysuria, flank pain, hematuria and urgency.   Musculoskeletal:  Negative for arthralgias.   Neurological:  Negative for dizziness.   Psychiatric/Behavioral:  Negative for confusion.        Objective:      Vitals:    04/25/24 0818   Weight: 81 kg (178 lb 9.2 oz)           Physical Exam  Vitals and nursing note reviewed.   Constitutional:       Appearance: He is well-developed.   HENT:      Head: Normocephalic.   Eyes:      Conjunctiva/sclera: Conjunctivae normal.   Neck:      Thyroid: No thyromegaly.      Trachea: No tracheal deviation.   Cardiovascular:      Rate and Rhythm: Normal rate.      Heart sounds: Normal heart sounds.   Pulmonary:      Effort: Pulmonary effort is normal. No respiratory distress.      Breath sounds: Normal breath sounds. No  wheezing.   Abdominal:      General: Bowel sounds are normal.      Palpations: Abdomen is soft.      Tenderness: There is no abdominal tenderness. There is no rebound.      Hernia: No hernia is present.   Musculoskeletal:         General: No tenderness. Normal range of motion.      Cervical back: Normal range of motion and neck supple.   Lymphadenopathy:      Cervical: No cervical adenopathy.   Skin:     General: Skin is warm and dry.      Findings: No erythema or rash.   Neurological:      Mental Status: He is alert and oriented to person, place, and time.   Psychiatric:         Behavior: Behavior normal.         Thought Content: Thought content normal.         Judgment: Judgment normal.         Urine dipstick shows negative for all components.  Micro exam: negative for WBC's or RBC's.             Component Ref Range & Units 3 d ago  (4/22/24) 2 mo ago  (2/19/24) 8 mo ago  (8/8/23) 8 mo ago  (8/4/23) 10 mo ago  (6/20/23)   PSA Diagnostic 0.00 - 4.00 ng/mL 2.6 4.2 High  CM 23.9 High  CM 19.8 High  CM 21.0 High  CM   Comment: The testing method is a chemiluminescent microparticle immunoassay  manufactured by [employer] and performed on the   or  [employer] system. Values obtained with different assay manufacturers  for  methods may be different and cannot be used interchangeably.  PSA Expected levels:  Hormonal Therapy: <0.05 ng/ml  Prostatectomy: <0.01 ng/ml  Radiation Therapy: <1.00 ng/ml   Resulting Agency  [facility]              Narrative  Performed by: [facility]  PSA 3 months      Specimen Collected: 04/22/24 08:25 CDT           Assessment:       1. Prostate cancer    2. Other male erectile dysfunction    3. Nocturia    4. Hypogonadism in male                  Plan:       1. Prostate cancer  Doing well  - Prostate Specific Antigen, Diagnostic; Future    2. Other male erectile dysfunction  Viagra    3. Nocturia  stable    4. Hypogonadism in male  We agreed to look into restarting TRT  at a year after XRT.             Follow up in about 3 months (around 7/25/2024) for Follow up Established, Review PSA.

## 2024-05-26 DIAGNOSIS — E03.9 HYPOTHYROIDISM, UNSPECIFIED TYPE: ICD-10-CM

## 2024-05-26 RX ORDER — LEVOTHYROXINE SODIUM 150 UG/1
150 TABLET ORAL
Qty: 90 TABLET | Refills: 2 | Status: SHIPPED | OUTPATIENT
Start: 2024-05-26

## 2024-05-26 NOTE — TELEPHONE ENCOUNTER
Refill Decision Note   Junaid Rockweiler  is requesting a refill authorization.  Brief Assessment and Rationale for Refill:  Approve     Medication Therapy Plan:       Medication Reconciliation Completed: No   Comments:     No Care Gaps recommended.     Note composed:12:06 PM 05/26/2024

## 2024-05-26 NOTE — TELEPHONE ENCOUNTER
No care due was identified.  [facility] Embedded Care Due Messages. Reference number: [id number].   5/26/2024 1:27:45 AM CDT

## 2024-06-15 NOTE — PROGRESS NOTES
"  Anthony Rockweiler presented for a follow-up Medicare AWV today. The following components were reviewed and updated:    Medical history  Family History  Social history  Allergies and Current Medications  Health Risk Assessment  Health Maintenance  Care Team    **See Completed Assessments for Annual Wellness visit with in the encounter summary    The following assessments were completed:  Depression Screening  Cognitive function Screening  Timed Get Up Test  Whisper Test      Opioid documentation:      Patient does not have a current opioid prescription.          Vitals:    06/17/24 0914   BP: 130/82   BP Location: Right arm   Patient Position: Sitting   Pulse: 76   Resp: 18   Temp: 97.8 °F (36.6 °C)   SpO2: 97%   Weight: 81.8 kg (180 lb 3.6 oz)   Height: 5' 8" (1.727 m)     Body mass index is 27.4 kg/m².     Review of Systems   Constitutional:  Negative for chills, fever and weight loss.   HENT:  Positive for tinnitus. Negative for ear discharge and ear pain.    Eyes:  Negative for blurred vision and double vision.   Respiratory:  Negative for cough and shortness of breath.    Cardiovascular:  Negative for chest pain, palpitations, orthopnea and leg swelling.   Gastrointestinal:  Negative for constipation and diarrhea.   Genitourinary:  Negative for dysuria and hematuria.   Musculoskeletal:  Positive for back pain. Negative for myalgias.   Skin:  Negative for itching and rash.   Neurological:  Positive for headaches. Negative for dizziness.   Endo/Heme/Allergies:  Does not bruise/bleed easily.   Psychiatric/Behavioral:  Negative for depression. The patient does not have insomnia.         Physical Exam  Constitutional:       General: He is not in acute distress.     Appearance: He is not ill-appearing, toxic-appearing or diaphoretic.   HENT:      Head: Normocephalic and atraumatic.      Right Ear: External ear normal.      Left Ear: External ear normal.   Eyes:      Pupils: Pupils are equal, round, and reactive to " light.   Cardiovascular:      Rate and Rhythm: Normal rate and regular rhythm.      Pulses: Normal pulses.   Pulmonary:      Effort: No respiratory distress.   Musculoskeletal:         General: No swelling or tenderness.      Cervical back: Normal range of motion.   Skin:     General: Skin is warm and dry.   Neurological:      General: No focal deficit present.      Mental Status: He is oriented to person, place, and time.   Psychiatric:         Mood and Affect: Mood normal.         Behavior: Behavior normal.         Thought Content: Thought content normal.         Judgment: Judgment normal.          Media Information    File Link    Scan on 6/17/2024 10:19 AM by Anca Robledo APRN: Clock Draw Test       Diagnoses and health risks identified today and associated recommendations/orders:  1. Encounter for Medicare annual wellness exam  -Counseled on age appropriate medical preventative services including age appropriate cancer screenings, age appropriate eye and dental exams, over all nutritional health, need for a consistent exercise regimen, and an over all push towards maintaining a vigorous and active lifestyle.  Counseled on age appropriate vaccines and discussed upcoming health care needs based on age/gender. Discussed good sleep hygiene and stress management.        2. Encounter for preventive health examination  -Counseled on age appropriate medical preventative services including age appropriate cancer screenings, age appropriate eye and dental exams, over all nutritional health, need for a consistent exercise regimen, and an over all push towards maintaining a vigorous and active lifestyle.  Counseled on age appropriate vaccines and discussed upcoming health care needs based on age/gender. Discussed good sleep hygiene and stress management.        3. History of seizures  -Reports it has been a while she he has had seizures  -He is on preventive medicine  -Followed by Neurology    4. Prostate  cancer  -Followed by Urology  -Continue current treatment plan    5. Other insomnia  -The current medical regimen is effective; continue present plan and medications.     6. Migraine without aura and without status migrainosus, not intractable  -The current medical regimen is effective; continue present plan and medications.   -Followed by Neurology    7. Anxiety  -The current medical regimen is effective; continue present plan and medications.     8. Hypertension, unspecified type  -The current medical regimen is effective; continue present plan and medications.     9. Low back pain, unspecified back pain laterality, unspecified chronicity, unspecified whether sciatica present  -The current medical regimen is effective; continue present plan and medications.     10. Advanced directives, counseling/discussion  -I offered to discuss advanced care planning, including how to pick a person who would make decisions for you if you were unable to make them for yourself, called a health care power of , and what kind of decisions you might make such as use of life sustaining treatments such as ventilators and tube feeding when faced with a life limiting illness recorded on a living will that they will need to know. (How you want to be cared for as you near the end of your natural life)     X Patient is interested in learning more about how to make advanced directives.  I provided them paperwork and offered to discuss this with them.        11. Vaccine counseling  -Patient counseled on the importance of vaccines for preventive care  -Patient states he does not get vaccines      Provided Junaid with a 5-10 year written screening schedule and personal prevention plan. Recommendations were developed using the USPSTF age appropriate recommendations. Education, counseling, and referrals were provided as needed.  After Visit Summary printed and given to patient which includes a list of additional screenings\tests  needed.    Follow up in about 1 year (around 6/17/2025) for AWV.      JUAN Delacruz

## 2024-06-17 ENCOUNTER — OFFICE VISIT (OUTPATIENT)
Facility: CLINIC | Age: 69
End: 2024-06-17
Payer: MEDICARE

## 2024-06-17 VITALS
RESPIRATION RATE: 18 BRPM | DIASTOLIC BLOOD PRESSURE: 82 MMHG | BODY MASS INDEX: 27.32 KG/M2 | HEIGHT: 68 IN | OXYGEN SATURATION: 97 % | SYSTOLIC BLOOD PRESSURE: 130 MMHG | TEMPERATURE: 98 F | HEART RATE: 76 BPM | WEIGHT: 180.25 LBS

## 2024-06-17 DIAGNOSIS — C61 PROSTATE CANCER: ICD-10-CM

## 2024-06-17 DIAGNOSIS — Z00.00 ENCOUNTER FOR MEDICARE ANNUAL WELLNESS EXAM: ICD-10-CM

## 2024-06-17 DIAGNOSIS — Z87.898 HISTORY OF SEIZURES: ICD-10-CM

## 2024-06-17 DIAGNOSIS — I10 HYPERTENSION, UNSPECIFIED TYPE: ICD-10-CM

## 2024-06-17 DIAGNOSIS — Z71.89 ADVANCED DIRECTIVES, COUNSELING/DISCUSSION: ICD-10-CM

## 2024-06-17 DIAGNOSIS — F41.9 ANXIETY: ICD-10-CM

## 2024-06-17 DIAGNOSIS — Z00.00 ENCOUNTER FOR PREVENTIVE HEALTH EXAMINATION: ICD-10-CM

## 2024-06-17 DIAGNOSIS — G47.09 OTHER INSOMNIA: ICD-10-CM

## 2024-06-17 DIAGNOSIS — M54.50 LOW BACK PAIN, UNSPECIFIED BACK PAIN LATERALITY, UNSPECIFIED CHRONICITY, UNSPECIFIED WHETHER SCIATICA PRESENT: ICD-10-CM

## 2024-06-17 DIAGNOSIS — Z71.85 VACCINE COUNSELING: ICD-10-CM

## 2024-06-17 DIAGNOSIS — G43.009 MIGRAINE WITHOUT AURA AND WITHOUT STATUS MIGRAINOSUS, NOT INTRACTABLE: ICD-10-CM

## 2024-06-17 PROBLEM — G47.00 INSOMNIA: Status: ACTIVE | Noted: 2024-01-29

## 2024-06-17 PROBLEM — G43.909 MIGRAINE: Status: ACTIVE | Noted: 2023-06-09

## 2024-06-17 PROCEDURE — 1101F PT FALLS ASSESS-DOCD LE1/YR: CPT | Mod: HCNC,CPTII,S$GLB, | Performed by: NURSE PRACTITIONER

## 2024-06-17 PROCEDURE — 3288F FALL RISK ASSESSMENT DOCD: CPT | Mod: HCNC,CPTII,S$GLB, | Performed by: NURSE PRACTITIONER

## 2024-06-17 PROCEDURE — 1170F FXNL STATUS ASSESSED: CPT | Mod: HCNC,CPTII,S$GLB, | Performed by: NURSE PRACTITIONER

## 2024-06-17 PROCEDURE — 3079F DIAST BP 80-89 MM HG: CPT | Mod: HCNC,CPTII,S$GLB, | Performed by: NURSE PRACTITIONER

## 2024-06-17 PROCEDURE — 1160F RVW MEDS BY RX/DR IN RCRD: CPT | Mod: HCNC,CPTII,S$GLB, | Performed by: NURSE PRACTITIONER

## 2024-06-17 PROCEDURE — G0439 PPPS, SUBSEQ VISIT: HCPCS | Mod: HCNC,S$GLB,, | Performed by: NURSE PRACTITIONER

## 2024-06-17 PROCEDURE — 3044F HG A1C LEVEL LT 7.0%: CPT | Mod: HCNC,CPTII,S$GLB, | Performed by: NURSE PRACTITIONER

## 2024-06-17 PROCEDURE — 1159F MED LIST DOCD IN RCRD: CPT | Mod: HCNC,CPTII,S$GLB, | Performed by: NURSE PRACTITIONER

## 2024-06-17 PROCEDURE — 3075F SYST BP GE 130 - 139MM HG: CPT | Mod: HCNC,CPTII,S$GLB, | Performed by: NURSE PRACTITIONER

## 2024-06-17 PROCEDURE — 99999 PR PBB SHADOW E&M-EST. PATIENT-LVL IV: CPT | Mod: PBBFAC,HCNC,, | Performed by: NURSE PRACTITIONER

## 2024-06-17 RX ORDER — LORAZEPAM 1 MG/1
1 TABLET ORAL EVERY 12 HOURS PRN
COMMUNITY
Start: 2024-05-23

## 2024-06-17 NOTE — PATIENT INSTRUCTIONS
It was a pleasure to meet you.      Follow up in 1 year for AWV.     Please Follow up with PCP for Routine Care.         The following information is provided to all patients.  This information is to help you find resources for any of the problems found today that may be affecting your health:                Living healthy guide: www.[email or web address]       Understanding Diabetes: www.diabetes.org       Eating healthy: www.cdc.gov/healthyweight      CDC home safety checklist: www.cdc.gov/steadi/patient.html      Agency on Aging: www.goea.[place].[facility]       Alcoholics anonymous (AA): www.aa.org      Physical Activity: www.vivi.nih.gov/uv7ydkv       Tobacco use: www.quitwithusla.org

## 2024-07-19 ENCOUNTER — LAB VISIT (OUTPATIENT)
Dept: LAB | Facility: HOSPITAL | Age: 69
End: 2024-07-19
Attending: UROLOGY
Payer: MEDICARE

## 2024-07-19 DIAGNOSIS — C61 PROSTATE CANCER: ICD-10-CM

## 2024-07-19 LAB — COMPLEXED PSA SERPL-MCNC: 1.7 NG/ML (ref 0–4)

## 2024-07-19 PROCEDURE — 36415 COLL VENOUS BLD VENIPUNCTURE: CPT | Mod: HCNC,PO | Performed by: UROLOGY

## 2024-07-19 PROCEDURE — 84153 ASSAY OF PSA TOTAL: CPT | Mod: HCNC | Performed by: UROLOGY

## 2024-07-25 ENCOUNTER — OFFICE VISIT (OUTPATIENT)
Dept: UROLOGY | Facility: CLINIC | Age: 69
End: 2024-07-25
Payer: MEDICARE

## 2024-07-25 VITALS — WEIGHT: 182.63 LBS | BODY MASS INDEX: 27.77 KG/M2

## 2024-07-25 DIAGNOSIS — N52.8 OTHER MALE ERECTILE DYSFUNCTION: ICD-10-CM

## 2024-07-25 DIAGNOSIS — R35.1 NOCTURIA: ICD-10-CM

## 2024-07-25 DIAGNOSIS — C61 PROSTATE CANCER: Primary | ICD-10-CM

## 2024-07-25 PROCEDURE — 1160F RVW MEDS BY RX/DR IN RCRD: CPT | Mod: HCNC,CPTII,S$GLB, | Performed by: UROLOGY

## 2024-07-25 PROCEDURE — 99999 PR PBB SHADOW E&M-EST. PATIENT-LVL III: CPT | Mod: PBBFAC,HCNC,, | Performed by: UROLOGY

## 2024-07-25 PROCEDURE — 99214 OFFICE O/P EST MOD 30 MIN: CPT | Mod: HCNC,S$GLB,, | Performed by: UROLOGY

## 2024-07-25 PROCEDURE — 1159F MED LIST DOCD IN RCRD: CPT | Mod: HCNC,CPTII,S$GLB, | Performed by: UROLOGY

## 2024-07-25 PROCEDURE — 3044F HG A1C LEVEL LT 7.0%: CPT | Mod: HCNC,CPTII,S$GLB, | Performed by: UROLOGY

## 2024-07-25 PROCEDURE — 3008F BODY MASS INDEX DOCD: CPT | Mod: HCNC,CPTII,S$GLB, | Performed by: UROLOGY

## 2024-07-25 PROCEDURE — 1101F PT FALLS ASSESS-DOCD LE1/YR: CPT | Mod: HCNC,CPTII,S$GLB, | Performed by: UROLOGY

## 2024-07-25 PROCEDURE — 3288F FALL RISK ASSESSMENT DOCD: CPT | Mod: HCNC,CPTII,S$GLB, | Performed by: UROLOGY

## 2024-07-25 PROCEDURE — 1126F AMNT PAIN NOTED NONE PRSNT: CPT | Mod: HCNC,CPTII,S$GLB, | Performed by: UROLOGY

## 2024-07-25 NOTE — PROGRESS NOTES
Subjective:       Patient ID: Anthony Joseph Rockweiler is a 69 y.o. male The patient's last visit with me was on 4/25/2024.     Chief Complaint:   Chief Complaint   Patient presents with    Follow-up       Prostate Cancer  He underwent a prostate needle biopsy on 5/2/2023.  His biopsy was indicated due to: Elevated PSA.  Afterwards he experienced: Gross Hematuria.  These symptoms have resolved.  His PSA prior to biopsy was 29.5.  PSA Density 0.45. His prostate size was 60 grams.  The ultrasound did not show a median lobe.  He currently does have erectile dysfunction.  His pathology showed: Prostate Cancer.     6/22/2023  He is back with a new PSA, MRI, and PSMA PET scan.    9/26/2023  He met with Dr. Vuong.  They are waiting on his Polaris score.    1/25/2024  He has finished XRT followed by brachytherapy boost.  This concluded on 12/7/2023.  He has noted more hesitancy in the evening.    4/25/2024  He is doing okay.  He is working out, but is inquiring about timing of TRT.  He feels his mental and physical health is much better on TRT.    07/25/2024  He would like to get back on TRT.  He is having back aches, headaches, feeling poorly.        ACTIVE MEDICAL ISSUES:  Patient Active Problem List   Diagnosis    History of seizures    Headache    Nuclear sclerosis of both eyes    Moderate benzodiazepine use disorder    Prostate cancer    Anxiety    Hypertensive disorder    Insomnia    Migraine    Low back pain       ALLERGIES AND MEDICATIONS: updated and reviewed.  Review of patient's allergies indicates:   Allergen Reactions    Paroxetine      Urinary problems     Current Outpatient Medications   Medication Sig    amLODIPine (NORVASC) 5 MG tablet Take 5 mg by mouth 2 (two) times daily.    carvedilol (COREG) 12.5 MG tablet Take 12.5 mg by mouth 2 (two) times daily with meals.    fluticasone propionate (FLONASE) 50 mcg/actuation nasal spray 1 spray (50 mcg total) by Each Nostril route once daily.     mg  tablet Take 800 mg by mouth every 8 (eight) hours.    levothyroxine (SYNTHROID) 150 MCG tablet TAKE 1 TABLET ONE TIME DAILY    LORazepam (ATIVAN) 1 MG tablet Take 1 tablet (1 mg total) by mouth 2 (two) times daily as needed for Anxiety.    LORazepam (ATIVAN) 1 MG tablet Take 1 tablet by mouth every 12 (twelve) hours as needed.    losartan (COZAAR) 100 MG tablet Take 100 mg by mouth every morning.    ondansetron 4 mg/2 mL Soln     rosuvastatin (CRESTOR) 20 MG tablet Take 1 tablet (20 mg total) by mouth once daily.    tamsulosin (FLOMAX) 0.4 mg Cap Take 2 capsules (0.8 mg total) by mouth once daily.    valproic acid (DEPAKENE) 250 mg capsule Take 1 capsule (250 mg total) by mouth 4 (four) times daily.     No current facility-administered medications for this visit.       Review of Systems   Constitutional:  Negative for chills and fever.   HENT:  Negative for congestion.    Respiratory:  Negative for chest tightness and shortness of breath.    Cardiovascular:  Negative for chest pain and palpitations.   Gastrointestinal:  Negative for abdominal pain, constipation, diarrhea, nausea and vomiting.   Genitourinary:  Negative for difficulty urinating, dysuria, flank pain, hematuria and urgency.   Musculoskeletal:  Negative for arthralgias.   Neurological:  Negative for dizziness.   Psychiatric/Behavioral:  Negative for confusion.        Objective:      Vitals:    07/25/24 0920   Weight: 82.9 kg (182 lb 10.4 oz)           Physical Exam  Vitals and nursing note reviewed.   Constitutional:       Appearance: He is well-developed.   HENT:      Head: Normocephalic.   Eyes:      Conjunctiva/sclera: Conjunctivae normal.   Neck:      Thyroid: No thyromegaly.      Trachea: No tracheal deviation.   Cardiovascular:      Rate and Rhythm: Normal rate.      Heart sounds: Normal heart sounds.   Pulmonary:      Effort: Pulmonary effort is normal. No respiratory distress.      Breath sounds: Normal breath sounds. No wheezing.   Abdominal:       General: Bowel sounds are normal.      Palpations: Abdomen is soft.      Tenderness: There is no abdominal tenderness. There is no rebound.      Hernia: No hernia is present.   Musculoskeletal:         General: No tenderness. Normal range of motion.      Cervical back: Normal range of motion and neck supple.   Lymphadenopathy:      Cervical: No cervical adenopathy.   Skin:     General: Skin is warm and dry.      Findings: No erythema or rash.   Neurological:      Mental Status: He is alert and oriented to person, place, and time.   Psychiatric:         Behavior: Behavior normal.         Thought Content: Thought content normal.         Judgment: Judgment normal.         Urine dipstick shows negative for all components.  Micro exam: negative for WBC's or RBC's.    Component Ref Range & Units 6 d ago  (7/19/24) 3 mo ago  (4/22/24) 5 mo ago  (2/19/24) 11 mo ago  (8/8/23) 11 mo ago  (8/4/23) 1 yr ago  (6/20/23)   PSA Diagnostic 0.00 - 4.00 ng/mL 1.7 2.6 CM 4.2 High  CM 23.9 High  CM 19.8 High  CM 21.0 High  CM   Comment: The testing method is a chemiluminescent microparticle immunoassay  manufactured by [employer] and performed on the   or  [employer] system. Values obtained with different assay manufacturers  for  methods may be different and cannot be used interchangeably.  PSA Expected levels:  Hormonal Therapy: <0.05 ng/ml  Prostatectomy: <0.01 ng/ml  Radiation Therapy: <1.00 ng/ml   Resulting Agency  [facility]              Narrative  Performed by: [facility]  PSA 3 months      Specimen Collected: 07/19/24 08:03 CDT             Assessment:       1. Prostate cancer    2. Other male erectile dysfunction    3. Nocturia                    Plan:       1. Prostate cancer  We agreed to start TRT after next visit, given his numbers look reasonable.    - Testosterone; Future  - Prostate Specific Antigen, Diagnostic; Future    2. Other male erectile dysfunction  monitor    3.  Nocturia  Limit evening fluids             Follow up in about 3 months (around 10/25/2024) for Follow up Established, Review labs.

## 2024-08-19 ENCOUNTER — LAB VISIT (OUTPATIENT)
Dept: LAB | Facility: HOSPITAL | Age: 69
End: 2024-08-19
Attending: FAMILY MEDICINE
Payer: MEDICARE

## 2024-08-19 DIAGNOSIS — I10 ESSENTIAL HYPERTENSION: ICD-10-CM

## 2024-08-19 DIAGNOSIS — E78.1 HYPERTRIGLYCERIDEMIA: ICD-10-CM

## 2024-08-19 DIAGNOSIS — E05.90 SUBCLINICAL HYPERTHYROIDISM: ICD-10-CM

## 2024-08-19 DIAGNOSIS — Z87.898 HISTORY OF PREDIABETES: ICD-10-CM

## 2024-08-19 LAB
ALBUMIN SERPL BCP-MCNC: 3.9 G/DL (ref 3.5–5.2)
ALP SERPL-CCNC: 88 U/L (ref 55–135)
ALT SERPL W/O P-5'-P-CCNC: 40 U/L (ref 10–44)
ANION GAP SERPL CALC-SCNC: 9 MMOL/L (ref 8–16)
AST SERPL-CCNC: 26 U/L (ref 10–40)
BASOPHILS # BLD AUTO: 0.06 K/UL (ref 0–0.2)
BASOPHILS NFR BLD: 1.1 % (ref 0–1.9)
BILIRUB SERPL-MCNC: 0.5 MG/DL (ref 0.1–1)
BUN SERPL-MCNC: 21 MG/DL (ref 8–23)
CALCIUM SERPL-MCNC: 10.2 MG/DL (ref 8.7–10.5)
CHLORIDE SERPL-SCNC: 107 MMOL/L (ref 95–110)
CHOLEST SERPL-MCNC: 117 MG/DL (ref 120–199)
CHOLEST/HDLC SERPL: 3.1 {RATIO} (ref 2–5)
CO2 SERPL-SCNC: 25 MMOL/L (ref 23–29)
CREAT SERPL-MCNC: 1.1 MG/DL (ref 0.5–1.4)
DIFFERENTIAL METHOD BLD: NORMAL
EOSINOPHIL # BLD AUTO: 0.2 K/UL (ref 0–0.5)
EOSINOPHIL NFR BLD: 3.7 % (ref 0–8)
ERYTHROCYTE [DISTWIDTH] IN BLOOD BY AUTOMATED COUNT: 13.2 % (ref 11.5–14.5)
EST. GFR  (NO RACE VARIABLE): >60 ML/MIN/1.73 M^2
ESTIMATED AVG GLUCOSE: 126 MG/DL (ref 68–131)
GLUCOSE SERPL-MCNC: 126 MG/DL (ref 70–110)
HBA1C MFR BLD: 6 % (ref 4–5.6)
HCT VFR BLD AUTO: 42 % (ref 40–54)
HDLC SERPL-MCNC: 38 MG/DL (ref 40–75)
HDLC SERPL: 32.5 % (ref 20–50)
HGB BLD-MCNC: 14.3 G/DL (ref 14–18)
IMM GRANULOCYTES # BLD AUTO: 0.02 K/UL (ref 0–0.04)
IMM GRANULOCYTES NFR BLD AUTO: 0.4 % (ref 0–0.5)
LDLC SERPL CALC-MCNC: 52.8 MG/DL (ref 63–159)
LYMPHOCYTES # BLD AUTO: 1.3 K/UL (ref 1–4.8)
LYMPHOCYTES NFR BLD: 24.3 % (ref 18–48)
MCH RBC QN AUTO: 30.2 PG (ref 27–31)
MCHC RBC AUTO-ENTMCNC: 34 G/DL (ref 32–36)
MCV RBC AUTO: 89 FL (ref 82–98)
MONOCYTES # BLD AUTO: 0.7 K/UL (ref 0.3–1)
MONOCYTES NFR BLD: 13.6 % (ref 4–15)
NEUTROPHILS # BLD AUTO: 3.1 K/UL (ref 1.8–7.7)
NEUTROPHILS NFR BLD: 56.9 % (ref 38–73)
NONHDLC SERPL-MCNC: 79 MG/DL
NRBC BLD-RTO: 0 /100 WBC
PLATELET # BLD AUTO: 190 K/UL (ref 150–450)
PMV BLD AUTO: 10.2 FL (ref 9.2–12.9)
POTASSIUM SERPL-SCNC: 4.4 MMOL/L (ref 3.5–5.1)
PROT SERPL-MCNC: 7.1 G/DL (ref 6–8.4)
RBC # BLD AUTO: 4.74 M/UL (ref 4.6–6.2)
SODIUM SERPL-SCNC: 141 MMOL/L (ref 136–145)
T4 FREE SERPL-MCNC: 1.53 NG/DL (ref 0.71–1.51)
TRIGL SERPL-MCNC: 131 MG/DL (ref 30–150)
TSH SERPL DL<=0.005 MIU/L-ACNC: <0.01 UIU/ML (ref 0.4–4)
WBC # BLD AUTO: 5.43 K/UL (ref 3.9–12.7)

## 2024-08-19 PROCEDURE — 84439 ASSAY OF FREE THYROXINE: CPT | Mod: HCNC | Performed by: FAMILY MEDICINE

## 2024-08-19 PROCEDURE — 84443 ASSAY THYROID STIM HORMONE: CPT | Mod: HCNC | Performed by: FAMILY MEDICINE

## 2024-08-19 PROCEDURE — 85025 COMPLETE CBC W/AUTO DIFF WBC: CPT | Mod: HCNC | Performed by: FAMILY MEDICINE

## 2024-08-19 PROCEDURE — 80061 LIPID PANEL: CPT | Mod: HCNC | Performed by: FAMILY MEDICINE

## 2024-08-19 PROCEDURE — 80053 COMPREHEN METABOLIC PANEL: CPT | Mod: HCNC | Performed by: FAMILY MEDICINE

## 2024-08-19 PROCEDURE — 36415 COLL VENOUS BLD VENIPUNCTURE: CPT | Mod: HCNC,PO | Performed by: FAMILY MEDICINE

## 2024-08-19 PROCEDURE — 83036 HEMOGLOBIN GLYCOSYLATED A1C: CPT | Mod: HCNC | Performed by: FAMILY MEDICINE

## 2024-08-26 ENCOUNTER — OFFICE VISIT (OUTPATIENT)
Dept: FAMILY MEDICINE | Facility: CLINIC | Age: 69
End: 2024-08-26
Payer: MEDICARE

## 2024-08-26 VITALS
DIASTOLIC BLOOD PRESSURE: 68 MMHG | HEART RATE: 72 BPM | OXYGEN SATURATION: 96 % | WEIGHT: 179.81 LBS | BODY MASS INDEX: 27.34 KG/M2 | TEMPERATURE: 98 F | SYSTOLIC BLOOD PRESSURE: 112 MMHG

## 2024-08-26 DIAGNOSIS — F41.1 GAD (GENERALIZED ANXIETY DISORDER): ICD-10-CM

## 2024-08-26 DIAGNOSIS — R73.03 PRE-DIABETES: ICD-10-CM

## 2024-08-26 DIAGNOSIS — C61 PROSTATE CANCER: ICD-10-CM

## 2024-08-26 DIAGNOSIS — E78.5 HYPERLIPIDEMIA, UNSPECIFIED HYPERLIPIDEMIA TYPE: ICD-10-CM

## 2024-08-26 DIAGNOSIS — I10 ESSENTIAL HYPERTENSION: ICD-10-CM

## 2024-08-26 DIAGNOSIS — E03.9 HYPOTHYROIDISM, UNSPECIFIED TYPE: Primary | ICD-10-CM

## 2024-08-26 PROCEDURE — 3074F SYST BP LT 130 MM HG: CPT | Mod: HCNC,CPTII,S$GLB, | Performed by: FAMILY MEDICINE

## 2024-08-26 PROCEDURE — G2211 COMPLEX E/M VISIT ADD ON: HCPCS | Mod: HCNC,S$GLB,, | Performed by: FAMILY MEDICINE

## 2024-08-26 PROCEDURE — 99999 PR PBB SHADOW E&M-EST. PATIENT-LVL IV: CPT | Mod: PBBFAC,HCNC,, | Performed by: FAMILY MEDICINE

## 2024-08-26 PROCEDURE — 99214 OFFICE O/P EST MOD 30 MIN: CPT | Mod: HCNC,S$GLB,, | Performed by: FAMILY MEDICINE

## 2024-08-26 PROCEDURE — 3008F BODY MASS INDEX DOCD: CPT | Mod: HCNC,CPTII,S$GLB, | Performed by: FAMILY MEDICINE

## 2024-08-26 PROCEDURE — 3078F DIAST BP <80 MM HG: CPT | Mod: HCNC,CPTII,S$GLB, | Performed by: FAMILY MEDICINE

## 2024-08-26 PROCEDURE — 3288F FALL RISK ASSESSMENT DOCD: CPT | Mod: HCNC,CPTII,S$GLB, | Performed by: FAMILY MEDICINE

## 2024-08-26 PROCEDURE — 1125F AMNT PAIN NOTED PAIN PRSNT: CPT | Mod: HCNC,CPTII,S$GLB, | Performed by: FAMILY MEDICINE

## 2024-08-26 PROCEDURE — 3044F HG A1C LEVEL LT 7.0%: CPT | Mod: HCNC,CPTII,S$GLB, | Performed by: FAMILY MEDICINE

## 2024-08-26 PROCEDURE — 1160F RVW MEDS BY RX/DR IN RCRD: CPT | Mod: HCNC,CPTII,S$GLB, | Performed by: FAMILY MEDICINE

## 2024-08-26 PROCEDURE — 4010F ACE/ARB THERAPY RXD/TAKEN: CPT | Mod: HCNC,CPTII,S$GLB, | Performed by: FAMILY MEDICINE

## 2024-08-26 PROCEDURE — 1101F PT FALLS ASSESS-DOCD LE1/YR: CPT | Mod: HCNC,CPTII,S$GLB, | Performed by: FAMILY MEDICINE

## 2024-08-26 PROCEDURE — 1159F MED LIST DOCD IN RCRD: CPT | Mod: HCNC,CPTII,S$GLB, | Performed by: FAMILY MEDICINE

## 2024-08-26 RX ORDER — LEVOTHYROXINE SODIUM 137 UG/1
137 TABLET ORAL
Qty: 30 TABLET | Refills: 11 | Status: SHIPPED | OUTPATIENT
Start: 2024-08-26 | End: 2025-08-26

## 2024-08-26 NOTE — PROGRESS NOTES
Assessment & Plan:    Hypothyroidism, unspecified type  -     levothyroxine (SYNTHROID) 137 MCG Tab tablet; Take 1 tablet (137 mcg total) by mouth before breakfast.  Dispense: 30 tablet; Refill: 11  -     TSH; Future; Expected date: 08/26/2024  -     T4, Free; Future; Expected date: 08/26/2024    Lower the Synthroid to 137 mcg. Repeat thyroid studies in 8 weeks.    Essential hypertension  Controlled. Continue current therapy.     Pre-diabetes  Patient is aware of the dietary changes needed to lower his A1c.    Hyperlipidemia, unspecified hyperlipidemia type  Controlled. Continue current therapy.     Prostate cancer  PSA in normal range, being managed by Urology.    RONDA (generalized anxiety disorder)  Controlled. Continue current therapy.       Follow-up: Follow up in about 6 months (around 2/26/2025).  ______________________________________________________________________    Chief Complaint  Chief Complaint   Patient presents with    Hypothyroidism    Health Maintenance     6 month follow up also been having headaches        HPI  Anthony Joseph Rockweiler is a 69 y.o. male with medical diagnoses as listed in the medical history and problem list that presents to the office to follow up on his chronic conditions. He c/o increased frequency of headaches, back pain, and lower energy level since being off TRT. He has been following up with Urology for prostate CA, which was treated with XRT and brachytherapy. Anticipating restarting TRT after next follow up in 3 months if PSA is in normal range.     Most recent pertinent workup:     Last CBC Results:   Lab Results   Component Value Date    WBC 5.43 08/19/2024    HGB 14.3 08/19/2024    HCT 42.0 08/19/2024     08/19/2024       Last CMP Results  Lab Results   Component Value Date     08/19/2024    K 4.4 08/19/2024     08/19/2024    CO2 25 08/19/2024    BUN 21 08/19/2024    CREATININE 1.1 08/19/2024    CALCIUM 10.2 08/19/2024    ALBUMIN 3.9 08/19/2024     AST 26 08/19/2024    ALT 40 08/19/2024       Last Lipids  Lab Results   Component Value Date    CHOL 117 (L) 08/19/2024    TRIG 131 08/19/2024    HDL 38 (L) 08/19/2024    LDLCALC 52.8 (L) 08/19/2024       Last A1C  Lab Results   Component Value Date    HGBA1C 6.0 (H) 08/19/2024         Health Maintenance         Date Due Completion Date    Pneumococcal Vaccines (Age 65+) (1 of 2 - PCV) Never done ---    Shingles Vaccine (1 of 2) Never done ---    TETANUS VACCINE 09/09/2015 9/9/2005    RSV Vaccine (Age 60+ and Pregnant patients) (1 - 1-dose 60+ series) Never done ---    COVID-19 Vaccine (4 - 2023-24 season) 09/01/2023 1/28/2022    Influenza Vaccine (1) 09/01/2024 ---    Hemoglobin A1c (Prediabetes) 08/19/2025 8/19/2024    Colorectal Cancer Screening 10/02/2027 10/2/2017    Lipid Panel 08/19/2029 8/19/2024              PAST MEDICAL HISTORY:  Past Medical History:   Diagnosis Date    Headache     migraines    Hx of psychiatric care     Hypertension     Prostate cancer     Psychiatric problem     Seizures     Therapy        PAST SURGICAL HISTORY:  Past Surgical History:   Procedure Laterality Date    PROSTATE BIOPSY  05/02/2023    RADIOACTIVE SEED IMPLANTATION N/A 12/7/2023    Procedure: INSERTION, RADIOACTIVE SEED;  Surgeon: Deon Guzman MD;  Location: [address];  Service: Urology;  Laterality: N/A;    TRANSRECTAL ULTRASOUND EXAMINATION N/A 12/7/2023    Procedure: ULTRASOUND, RECTAL APPROACH;  Surgeon: Deon Guzman MD;  Location: [facility] OR [address] [place] [address];  Service: Urology;  Laterality: N/A;       SOCIAL HISTORY:  Social History     Socioeconomic History    Marital status:     Number of children: 2   Tobacco Use    Smoking status: Never    Smokeless tobacco: Never   Substance and Sexual Activity    Alcohol use: No    Drug use: No    Sexual activity: Yes     Partners: Female     Birth control/protection: Condom     Social Determinants of Health     Financial Resource Strain: Low Risk  (6/17/2024)     Overall Financial Resource Strain (CARDIA)     Difficulty of Paying Living Expenses: Not hard at all   Food Insecurity: No Food Insecurity (6/17/2024)    Hunger Vital Sign     Worried About Running Out of Food in the Last Year: Never true     Ran Out of Food in the Last Year: Never true   Transportation Needs: No Transportation Needs (6/17/2024)    PRAPARE - Transportation     Lack of Transportation (Medical): No     Lack of Transportation (Non-Medical): No   Physical Activity: Sufficiently Active (6/17/2024)    Exercise Vital Sign     Days of Exercise per Week: 6 days     Minutes of Exercise per Session: 40 min   Stress: Stress Concern Present (6/17/2024)    [employer] - Occupational Stress Questionnaire     Feeling of Stress : To some extent   Housing Stability: Low Risk  (6/17/2024)    Housing Stability Vital Sign     Unable to Pay for Housing in the Last Year: No     Homeless in the Last Year: No       FAMILY HISTORY:  Family History   Problem Relation Name Age of Onset    No Known Problems Mother      No Known Problems Father      No Known Problems Sister      No Known Problems Brother      No Known Problems Maternal Aunt      No Known Problems Maternal Uncle      No Known Problems Paternal Aunt      No Known Problems Paternal Uncle      No Known Problems Maternal Grandmother      No Known Problems Maternal Grandfather      No Known Problems Paternal Grandmother      No Known Problems Paternal Grandfather      Suicide Other nephew     Dementia Neg Hx         ALLERGIES AND MEDICATIONS: updated and reviewed.  Review of patient's allergies indicates:   Allergen Reactions    Paroxetine      Urinary problems     Current Outpatient Medications   Medication Sig Dispense Refill    amLODIPine (NORVASC) 5 MG tablet Take 5 mg by mouth 2 (two) times daily.  3    carvedilol (COREG) 12.5 MG tablet Take 12.5 mg by mouth 2 (two) times daily with meals.      fluticasone propionate (FLONASE) 50  mcg/actuation nasal spray 1 spray (50 mcg total) by Each Nostril route once daily. 16 g 0     mg tablet Take 800 mg by mouth every 8 (eight) hours.      LORazepam (ATIVAN) 1 MG tablet Take 1 tablet (1 mg total) by mouth 2 (two) times daily as needed for Anxiety. 60 tablet 2    LORazepam (ATIVAN) 1 MG tablet Take 1 tablet by mouth every 12 (twelve) hours as needed.      losartan (COZAAR) 100 MG tablet Take 100 mg by mouth every morning.      ondansetron 4 mg/2 mL Soln       rosuvastatin (CRESTOR) 20 MG tablet Take 1 tablet (20 mg total) by mouth once daily. 30 tablet 11    tamsulosin (FLOMAX) 0.4 mg Cap Take 2 capsules (0.8 mg total) by mouth once daily. 180 capsule 3    valproic acid (DEPAKENE) 250 mg capsule Take 1 capsule (250 mg total) by mouth 4 (four) times daily. 120 capsule 11    levothyroxine (SYNTHROID) 137 MCG Tab tablet Take 1 tablet (137 mcg total) by mouth before breakfast. 30 tablet 11     No current facility-administered medications for this visit.         ROS  Review of Systems   Constitutional:  Positive for fatigue. Negative for activity change and unexpected weight change.   Musculoskeletal:  Positive for back pain.   Neurological:  Positive for headaches.           Physical Exam  Vitals:    08/26/24 1023   BP: 112/68   Pulse: 72   Temp: 97.9 °F (36.6 °C)   TempSrc: Oral   SpO2: 96%   Weight: 81.5 kg (179 lb 12.6 oz)    Body mass index is 27.34 kg/m².  Weight: 81.5 kg (179 lb 12.6 oz)       Physical Exam  Constitutional:       General: He is not in acute distress.     Appearance: Normal appearance.   HENT:      Head: Normocephalic and atraumatic.   Neck:      Thyroid: No thyromegaly.      Vascular: No carotid bruit.   Cardiovascular:      Rate and Rhythm: Normal rate and regular rhythm.      Pulses: Normal pulses.      Heart sounds: Normal heart sounds.   Pulmonary:      Effort: Pulmonary effort is normal. No respiratory distress.      Breath sounds: Normal breath sounds.   Musculoskeletal:       Cervical back: Neck supple.      Right lower leg: No edema.      Left lower leg: No edema.   Lymphadenopathy:      Cervical: No cervical adenopathy.   Skin:     General: Skin is warm and dry.      Findings: No rash.   Neurological:      General: No focal deficit present.      Mental Status: He is alert and oriented to person, place, and time.   Psychiatric:         Mood and Affect: Mood normal.         Behavior: Behavior normal.         Thought Content: Thought content normal.

## 2024-09-04 ENCOUNTER — LAB VISIT (OUTPATIENT)
Dept: LAB | Facility: HOSPITAL | Age: 69
End: 2024-09-04
Attending: RADIOLOGY
Payer: MEDICARE

## 2024-09-04 DIAGNOSIS — C61 PROSTATE CANCER: ICD-10-CM

## 2024-09-04 LAB — COMPLEXED PSA SERPL-MCNC: 1 NG/ML (ref 0–4)

## 2024-09-04 PROCEDURE — 84153 ASSAY OF PSA TOTAL: CPT | Mod: HCNC | Performed by: RADIOLOGY

## 2024-09-04 PROCEDURE — 36415 COLL VENOUS BLD VENIPUNCTURE: CPT | Mod: HCNC,PO | Performed by: RADIOLOGY

## 2024-09-14 ENCOUNTER — HOSPITAL ENCOUNTER (EMERGENCY)
Facility: HOSPITAL | Age: 69
Discharge: HOME | End: 2024-09-14
Attending: EMERGENCY MEDICINE
Payer: MEDICARE

## 2024-09-14 VITALS
OXYGEN SATURATION: 99 % | WEIGHT: 175 LBS | RESPIRATION RATE: 17 BRPM | DIASTOLIC BLOOD PRESSURE: 75 MMHG | TEMPERATURE: 98 F | BODY MASS INDEX: 26.52 KG/M2 | HEIGHT: 68 IN | SYSTOLIC BLOOD PRESSURE: 124 MMHG | HEART RATE: 79 BPM

## 2024-09-14 DIAGNOSIS — B34.9 VIRAL SYNDROME: Primary | ICD-10-CM

## 2024-09-14 LAB
CTP QC/QA: YES
INFLUENZA A ANTIGEN, POC: NEGATIVE
INFLUENZA B ANTIGEN, POC: NEGATIVE
POC RAPID STREP A: NEGATIVE
SARS-COV-2 RDRP RESP QL NAA+PROBE: NEGATIVE

## 2024-09-14 PROCEDURE — 87804 INFLUENZA ASSAY W/OPTIC: CPT | Mod: 59,HCNC,ER

## 2024-09-14 PROCEDURE — 87635 SARS-COV-2 COVID-19 AMP PRB: CPT | Mod: HCNC,ER | Performed by: EMERGENCY MEDICINE

## 2024-09-14 PROCEDURE — 87880 STREP A ASSAY W/OPTIC: CPT | Mod: HCNC,ER

## 2024-09-14 PROCEDURE — 99282 EMERGENCY DEPT VISIT SF MDM: CPT | Mod: HCNC,ER

## 2024-09-14 RX ORDER — FLUTICASONE PROPIONATE 50 MCG
1 SPRAY, SUSPENSION (ML) NASAL 2 TIMES DAILY PRN
Qty: 15 G | Refills: 0 | Status: SHIPPED | OUTPATIENT
Start: 2024-09-14

## 2024-09-14 RX ORDER — CETIRIZINE HYDROCHLORIDE 10 MG/1
10 TABLET ORAL DAILY
Qty: 30 TABLET | Refills: 0 | Status: SHIPPED | OUTPATIENT
Start: 2024-09-14 | End: 2025-09-14

## 2024-09-14 NOTE — DISCHARGE INSTRUCTIONS

## 2024-09-14 NOTE — ED PROVIDER NOTES
Encounter Date: 9/14/2024       History     Chief Complaint   Patient presents with    URI     Complains of frontal headache, nasal congestion, and sore throat x1 week.      69-year-old male with past medical history of psychiatric care, hypertension, seizures presents to ED for emergent evaluation of one-week history of rhinorrhea, nasal congestion, postnasal drip.  He denies any attempted treatment.  He denies any fever, chills, sore throat, dysphagia, chest pain, shortness of breath, abdominal pain, nausea, vomiting, diarrhea dysuria, hematuria.  He denies any sick contacts.  No other symptoms reported.    The history is provided by the patient. No  was used.     Review of patient's allergies indicates:   Allergen Reactions    Paroxetine      Urinary problems     Past Medical History:   Diagnosis Date    Headache     migraines    Hx of psychiatric care     Hypertension     Prostate cancer     Psychiatric problem     Seizures     Therapy      Past Surgical History:   Procedure Laterality Date    PROSTATE BIOPSY  05/02/2023    RADIOACTIVE SEED IMPLANTATION N/A 12/7/2023    Procedure: INSERTION, RADIOACTIVE SEED;  Surgeon: Deon Guzman MD;  Location: [facility] OR [address] [place] [address];  Service: Urology;  Laterality: N/A;    TRANSRECTAL ULTRASOUND EXAMINATION N/A 12/7/2023    Procedure: ULTRASOUND, RECTAL APPROACH;  Surgeon: Deon Guzman MD;  Location: [facility] OR [address] [place] [address];  Service: Urology;  Laterality: N/A;     Family History   Problem Relation Name Age of Onset    No Known Problems Mother      No Known Problems Father      No Known Problems Sister      No Known Problems Brother      No Known Problems Maternal Aunt      No Known Problems Maternal Uncle      No Known Problems Paternal Aunt      No Known Problems Paternal Uncle      No Known Problems Maternal Grandmother      No Known Problems Maternal Grandfather      No Known Problems Paternal Grandmother      No Known Problems Paternal Grandfather       Suicide Other nephew     Dementia Neg Hx       Social History     Tobacco Use    Smoking status: Never    Smokeless tobacco: Never   Substance Use Topics    Alcohol use: No    Drug use: No     Review of Systems   Constitutional:  Negative for chills and fever.   HENT:  Positive for congestion, postnasal drip and rhinorrhea. Negative for ear pain, sore throat and trouble swallowing.    Eyes:  Negative for redness.   Respiratory:  Negative for cough and shortness of breath.    Cardiovascular:  Negative for chest pain.   Gastrointestinal:  Negative for abdominal pain, diarrhea, nausea and vomiting.   Genitourinary:  Negative for decreased urine volume, difficulty urinating, dysuria, frequency, hematuria and urgency.   Musculoskeletal:  Negative for back pain and neck pain.   Skin:  Negative for rash.   Neurological:  Negative for headaches.   Psychiatric/Behavioral:  Negative for confusion.        Physical Exam     Initial Vitals [09/14/24 0845]   BP Pulse Resp Temp SpO2   116/80 70 18 97.4 °F (36.3 °C) 96 %      MAP       --         Physical Exam    Nursing note and vitals reviewed.  Constitutional: He appears well-developed and well-nourished. He is not diaphoretic.  Non-toxic appearance. No distress.   HENT:   Head: Normocephalic and atraumatic.   Right Ear: Hearing, tympanic membrane, external ear and ear canal normal. Tympanic membrane is not perforated, not erythematous and not bulging.   Left Ear: Hearing, tympanic membrane, external ear and ear canal normal. Tympanic membrane is not perforated, not erythematous and not bulging.   Nose: Nose normal.   Mouth/Throat: Uvula is midline and oropharynx is clear and moist.   No tenderness to palpation to maxillary or frontal sinuses.   Eyes: Conjunctivae and EOM are normal.   Neck: Neck supple.   Normal range of motion.   Full passive range of motion without pain.     Cardiovascular:            Pulses:       Radial pulses are 2+ on the right side and 2+ on the left  side.   Pulmonary/Chest: Effort normal and breath sounds normal. No respiratory distress. He has no decreased breath sounds.   Abdominal: Abdomen is soft and flat. There is no abdominal tenderness.   No right CVA tenderness.  No left CVA tenderness. There is no rebound and no guarding.   Musculoskeletal:      Cervical back: Full passive range of motion without pain, normal range of motion and neck supple. No rigidity.     Neurological: He is alert. No cranial nerve deficit.   Neuro intact.  Strength and sensation intact to bilateral upper and lower extremities.   Skin: Skin is warm and dry. No rash noted.         ED Course   Procedures  Labs Reviewed   SARS-COV-2 RDRP GENE       Result Value    POC Rapid COVID Negative       Acceptable Yes      Narrative:     This test utilizes isothermal nucleic acid amplification technology to detect the SARS-CoV-2 RdRp nucleic acid segment. The analytical sensitivity (limit of detection) is 500 copies/swab.     A POSITIVE result is indicative of the presence of SARS-CoV-2 RNA; clinical correlation with patient history and other diagnostic information is necessary to determine patient infection status.    A NEGATIVE result means that SARS-CoV-2 nucleic acids are not present above the limit of detection. A NEGATIVE result should be treated as presumptive. It does not rule out the possibility of COVID-19 and should not be the sole basis for treatment decisions. If COVID-19 is strongly suspected based on clinical and exposure history, re-testing using an alternate molecular assay should be considered.     Commercial kits are provided by [employer].       POCT INFLUENZA A/B MOLECULAR   POCT STREP A MOLECULAR   POCT STREP A, RAPID    POC Rapid Strep A negative     POCT RAPID INFLUENZA A/B    Influenza B Ag negative      Inflenza A Ag negative            Imaging Results    None          Medications - No data to display  Medical Decision Making  This is a   69-year-old male with past medical history of psychiatric care, hypertension, seizures presents to ED for emergent evaluation of one-week history of rhinorrhea, nasal congestion, postnasal drip.  He denies any attempted treatment.  He denies any fever, chills, sore throat, dysphagia, chest pain, shortness of breath, abdominal pain, nausea, vomiting, diarrhea dysuria, hematuria.  He denies any sick contacts.  No other symptoms reported.    On physical exam, patient is well-appearing and in no acute distress.  Nontoxic appearing.  Lungs are clear to auscultation bilaterally.  Abdomen is soft and nontender.  No guarding, rigidity, rebound.  2+ radial pulses bilaterally.  Posterior oropharynx is not erythematous.  No edema or exudate.  Uvula midline.  Bilateral tympanic membrane is normal.  No erythema, bulging, or perforations.  Neuro intact.  Strength and sensation intact bilateral upper and lower extremities.  Full range motion of neck.  No neck rigidity.  No tenderness to palpation to maxillary or frontal sinuses.  COVID, flu, strep negative.  Will discharge patient on Zyrtec and Flonase.  Urged prompt follow-up with PCP for further evaluation.    Strict return precautions given. I discussed with the patient/family the diagnosis, treatment plan, indications for return to the emergency department, and for expected follow-up. The patient/family verbalized an understanding. The patient/family is asked if there are any questions or concerns. We discuss the case, until all issues are addressed to the patient/family's satisfaction. Patient/family understands and is agreeable to the plan. Patient is stable and ready for discharge.      Amount and/or Complexity of Data Reviewed  Labs: ordered.    Risk  OTC drugs.                                      Clinical Impression:  Final diagnoses:  [B34.9] Viral syndrome (Primary)          ED Disposition Condition    Discharge Stable          ED Prescriptions       Medication Sig  Dispense Start Date End Date Auth. Provider    cetirizine (ZYRTEC) 10 MG tablet Take 1 tablet (10 mg total) by mouth once daily. 30 tablet 9/14/2024 9/14/2025 Rock Browne PA-C    fluticasone propionate (FLONASE) 50 mcg/actuation nasal spray 1 spray (50 mcg total) by Each Nostril route 2 (two) times daily as needed for Rhinitis. 15 g 9/14/2024 -- Rock Browne PA-C          Follow-up Information       Follow up With Specialties Details Why Contact Info    Myla Bryson, DO Family Medicine In 2 days for further evaluation [address] [facility] [address]  [phone]      [place] [facility] ED Emergency Medicine In 2 days If symptoms worsen [address] [facility] [address] [phone]             Rock Browne PA-C  09/14/24 105

## 2024-09-18 ENCOUNTER — OFFICE VISIT (OUTPATIENT)
Dept: RADIATION ONCOLOGY | Facility: CLINIC | Age: 69
End: 2024-09-18
Attending: RADIOLOGY
Payer: MEDICARE

## 2024-09-18 VITALS
OXYGEN SATURATION: 95 % | SYSTOLIC BLOOD PRESSURE: 131 MMHG | DIASTOLIC BLOOD PRESSURE: 88 MMHG | TEMPERATURE: 98 F | RESPIRATION RATE: 17 BRPM | BODY MASS INDEX: 27.36 KG/M2 | HEART RATE: 74 BPM | WEIGHT: 180.56 LBS | HEIGHT: 68 IN

## 2024-09-18 DIAGNOSIS — C61 PROSTATE CANCER: Primary | ICD-10-CM

## 2024-09-18 PROCEDURE — 99999 PR PBB SHADOW E&M-EST. PATIENT-LVL IV: CPT | Mod: PBBFAC,HCNC,, | Performed by: RADIOLOGY

## 2024-09-18 NOTE — PROGRESS NOTES
[facility] - Radiation Oncology     Patient ID: Anthony Joseph Rockweiler is a 69 y.o. male.    Chief Complaint: Follow-up      Mr. Rockweiler has a history of Stage IIIA (T2a, N0, M0, PSA > 20 GG1) prostate cancer  He presented with a  screening PSA of 29.5 ng/ml in February of 2023.  He admitted to using weekly testosterone cypionate for several years.  Biopsies on 5/2/23 revealed Morgan 6 (3+3) adenocarcinoma involving 11% of the core from the Rt. medial mid gland.  The remaining 11 cores revealed benign prostatic tissue. PSMA scan revealed increased uptake in the central aspect of the prostate, but no evidence of regional or distant metastatic disease.  MRI scan revealed a 47 cc  prostate with a 1.6 PI-RADS 5 lesion in the peripheral Rt. mid gland, with no extraprostatic extension.  There was a 1.9 cm PI-RADS 3 lesion in the base mid transitional zone, with no extraprostatic extension. Polaris indicated single modality therapy.  We discussed a number of options including rebiopsy.  The patient elected to proceed with radiotherapy.  He completed 46 Gy to the prostate with 50 Gy to the seminal vesicles. On 12/7/23 the patient underwent transperneal implantation of the prostate gland using Iodine 125 seeds.  The patient has been doing well since then.  Today the patient states he feels ok, not taking supplements.        Review of Systems   Constitutional:  Negative for activity change, appetite change, chills and fatigue.   Gastrointestinal:  Negative for diarrhea and fecal incontinence.   Genitourinary:  Negative for bladder incontinence, difficulty urinating, dysuria, frequency and hematuria.     Physical Exam  Constitutional:       General: He is not in acute distress.     Appearance: Normal appearance.   Neurological:      Mental Status: He is alert and oriented to person, place, and time.   Psychiatric:         Mood and Affect: Mood normal.         Judgment: Judgment normal.           Latest Reference Range & Units 08/08/23 07:58 09/04/24 08:50   PSA Diagnostic 0.00 - 4.00 ng/mL 23.9 (H) 1.0   (H): Data is abnormally high     Assessment and Plan   Prostate cancer - responding well to therapy.  He is planned for follow up with Dr. Foley.  Plan follow up with us in 9 months with PSA.  Recommended he try to decrease and/or discontinue his tamsulosin.

## 2024-10-09 ENCOUNTER — PATIENT OUTREACH (OUTPATIENT)
Dept: ADMINISTRATIVE | Facility: HOSPITAL | Age: 69
End: 2024-10-09
Payer: MEDICARE

## 2024-10-09 RX ORDER — AZITHROMYCIN 250 MG/1
TABLET, FILM COATED ORAL
COMMUNITY
Start: 2024-09-16

## 2024-10-09 NOTE — LETTER
AUTHORIZATION FOR RELEASE OF   CONFIDENTIAL INFORMATION    Dear Jean GRIFFITH,    We are seeing Anthony Joseph Rockweiler, date of birth 1955, in the clinic at [facility] FAMILY MED/ INTERNAL MED/ PEDS. Myla Bryson DO is the patient's PCP. Anthony Joseph Rockweiler has an outstanding lab/procedure at the time we reviewed his chart. In order to help keep his health information updated, he has authorized us to request the following medical record(s):        (  )  MAMMOGRAM                                      ( x ) COLONOSCOPY [id number]      (  )  PAP SMEAR                                          (  )  OUTSIDE LAB RESULTS     (  )  DEXA SCAN                                          (  )  EYE EXAM            (  )  FOOT EXAM                                          (  )  ENTIRE RECORD     (  )  OUTSIDE IMMUNIZATIONS                 (  )  _______________         Please fax records to [facility], Adams, Lyndsey R., DO,  [phone].     If you have any questions, please contact Sivan Paz LPN [facility] at   [phone].     Patient Name: Anthony Joseph Rockweiler  : 1955  Patient Phone #: [phone]

## 2024-10-10 ENCOUNTER — PATIENT OUTREACH (OUTPATIENT)
Dept: ADMINISTRATIVE | Facility: HOSPITAL | Age: 69
End: 2024-10-10
Payer: MEDICARE

## 2024-10-10 RX ORDER — LEVOTHYROXINE SODIUM 137 UG/1
1 TABLET ORAL DAILY
COMMUNITY

## 2024-10-25 ENCOUNTER — LAB VISIT (OUTPATIENT)
Dept: LAB | Facility: HOSPITAL | Age: 69
End: 2024-10-25
Attending: UROLOGY
Payer: MEDICARE

## 2024-10-25 DIAGNOSIS — C61 PROSTATE CANCER: ICD-10-CM

## 2024-10-25 DIAGNOSIS — E03.9 HYPOTHYROIDISM, UNSPECIFIED TYPE: ICD-10-CM

## 2024-10-25 DIAGNOSIS — Z87.898 HISTORY OF PREDIABETES: ICD-10-CM

## 2024-10-25 DIAGNOSIS — R73.03 PRE-DIABETES: ICD-10-CM

## 2024-10-25 DIAGNOSIS — I10 ESSENTIAL HYPERTENSION: Primary | ICD-10-CM

## 2024-10-25 DIAGNOSIS — E78.1 HYPERTRIGLYCERIDEMIA: ICD-10-CM

## 2024-10-25 LAB
COMPLEXED PSA SERPL-MCNC: 0.91 NG/ML (ref 0–4)
T4 FREE SERPL-MCNC: 1.47 NG/DL (ref 0.71–1.51)
TESTOST SERPL-MCNC: 76 NG/DL (ref 304–1227)
TSH SERPL DL<=0.005 MIU/L-ACNC: 0.05 UIU/ML (ref 0.4–4)

## 2024-10-25 PROCEDURE — 84439 ASSAY OF FREE THYROXINE: CPT | Mod: HCNC | Performed by: FAMILY MEDICINE

## 2024-10-25 PROCEDURE — 84153 ASSAY OF PSA TOTAL: CPT | Mod: HCNC | Performed by: UROLOGY

## 2024-10-25 PROCEDURE — 84443 ASSAY THYROID STIM HORMONE: CPT | Mod: HCNC | Performed by: FAMILY MEDICINE

## 2024-10-25 PROCEDURE — 36415 COLL VENOUS BLD VENIPUNCTURE: CPT | Mod: HCNC,PO | Performed by: UROLOGY

## 2024-10-25 PROCEDURE — 84403 ASSAY OF TOTAL TESTOSTERONE: CPT | Mod: HCNC | Performed by: UROLOGY

## 2024-10-29 DIAGNOSIS — E03.9 HYPOTHYROIDISM, UNSPECIFIED TYPE: ICD-10-CM

## 2024-10-30 RX ORDER — LEVOTHYROXINE SODIUM 137 UG/1
137 TABLET ORAL
Qty: 30 TABLET | Refills: 11 | Status: SHIPPED | OUTPATIENT
Start: 2024-10-30 | End: 2025-10-30

## 2024-10-31 ENCOUNTER — OFFICE VISIT (OUTPATIENT)
Dept: UROLOGY | Facility: CLINIC | Age: 69
End: 2024-10-31
Payer: MEDICARE

## 2024-10-31 VITALS — BODY MASS INDEX: 27.4 KG/M2 | WEIGHT: 180.25 LBS

## 2024-10-31 DIAGNOSIS — E29.1 HYPOGONADISM IN MALE: ICD-10-CM

## 2024-10-31 DIAGNOSIS — C61 PROSTATE CANCER: Primary | ICD-10-CM

## 2024-10-31 DIAGNOSIS — N52.8 OTHER MALE ERECTILE DYSFUNCTION: ICD-10-CM

## 2024-10-31 DIAGNOSIS — R35.1 NOCTURIA: ICD-10-CM

## 2024-10-31 PROCEDURE — 1101F PT FALLS ASSESS-DOCD LE1/YR: CPT | Mod: HCNC,CPTII,S$GLB, | Performed by: UROLOGY

## 2024-10-31 PROCEDURE — 3008F BODY MASS INDEX DOCD: CPT | Mod: HCNC,CPTII,S$GLB, | Performed by: UROLOGY

## 2024-10-31 PROCEDURE — 4010F ACE/ARB THERAPY RXD/TAKEN: CPT | Mod: HCNC,CPTII,S$GLB, | Performed by: UROLOGY

## 2024-10-31 PROCEDURE — 1160F RVW MEDS BY RX/DR IN RCRD: CPT | Mod: HCNC,CPTII,S$GLB, | Performed by: UROLOGY

## 2024-10-31 PROCEDURE — 99214 OFFICE O/P EST MOD 30 MIN: CPT | Mod: HCNC,S$GLB,, | Performed by: UROLOGY

## 2024-10-31 PROCEDURE — 3288F FALL RISK ASSESSMENT DOCD: CPT | Mod: HCNC,CPTII,S$GLB, | Performed by: UROLOGY

## 2024-10-31 PROCEDURE — 3044F HG A1C LEVEL LT 7.0%: CPT | Mod: HCNC,CPTII,S$GLB, | Performed by: UROLOGY

## 2024-10-31 PROCEDURE — 1159F MED LIST DOCD IN RCRD: CPT | Mod: HCNC,CPTII,S$GLB, | Performed by: UROLOGY

## 2024-10-31 PROCEDURE — 99999 PR PBB SHADOW E&M-EST. PATIENT-LVL III: CPT | Mod: PBBFAC,HCNC,, | Performed by: UROLOGY

## 2024-10-31 PROCEDURE — 1126F AMNT PAIN NOTED NONE PRSNT: CPT | Mod: HCNC,CPTII,S$GLB, | Performed by: UROLOGY

## 2024-10-31 RX ORDER — TESTOSTERONE CYPIONATE 200 MG/ML
200 INJECTION, SOLUTION INTRAMUSCULAR
Qty: 10 ML | Refills: 1 | Status: SHIPPED | OUTPATIENT
Start: 2024-10-31 | End: 2025-05-01

## 2024-11-05 ENCOUNTER — LAB VISIT (OUTPATIENT)
Dept: LAB | Facility: HOSPITAL | Age: 69
End: 2024-11-05
Attending: FAMILY MEDICINE
Payer: MEDICARE

## 2024-11-05 DIAGNOSIS — E03.9 HYPOTHYROIDISM, UNSPECIFIED TYPE: ICD-10-CM

## 2024-11-05 DIAGNOSIS — Z87.898 HISTORY OF PREDIABETES: ICD-10-CM

## 2024-11-05 DIAGNOSIS — R73.03 PRE-DIABETES: ICD-10-CM

## 2024-11-05 DIAGNOSIS — E78.1 HYPERTRIGLYCERIDEMIA: ICD-10-CM

## 2024-11-05 DIAGNOSIS — I10 ESSENTIAL HYPERTENSION: ICD-10-CM

## 2024-11-05 LAB
ALBUMIN SERPL BCP-MCNC: 4 G/DL (ref 3.5–5.2)
ALP SERPL-CCNC: 96 U/L (ref 40–150)
ALT SERPL W/O P-5'-P-CCNC: 39 U/L (ref 10–44)
ANION GAP SERPL CALC-SCNC: 13 MMOL/L (ref 8–16)
AST SERPL-CCNC: 26 U/L (ref 10–40)
BASOPHILS # BLD AUTO: 0.07 K/UL (ref 0–0.2)
BASOPHILS NFR BLD: 1 % (ref 0–1.9)
BILIRUB SERPL-MCNC: 0.4 MG/DL (ref 0.1–1)
BUN SERPL-MCNC: 16 MG/DL (ref 8–23)
CALCIUM SERPL-MCNC: 9.9 MG/DL (ref 8.7–10.5)
CHLORIDE SERPL-SCNC: 107 MMOL/L (ref 95–110)
CHOLEST SERPL-MCNC: 118 MG/DL (ref 120–199)
CHOLEST/HDLC SERPL: 3.3 {RATIO} (ref 2–5)
CO2 SERPL-SCNC: 20 MMOL/L (ref 23–29)
CREAT SERPL-MCNC: 0.9 MG/DL (ref 0.5–1.4)
DIFFERENTIAL METHOD BLD: ABNORMAL
EOSINOPHIL # BLD AUTO: 0.2 K/UL (ref 0–0.5)
EOSINOPHIL NFR BLD: 2.3 % (ref 0–8)
ERYTHROCYTE [DISTWIDTH] IN BLOOD BY AUTOMATED COUNT: 13.9 % (ref 11.5–14.5)
EST. GFR  (NO RACE VARIABLE): >60 ML/MIN/1.73 M^2
ESTIMATED AVG GLUCOSE: 128 MG/DL (ref 68–131)
GLUCOSE SERPL-MCNC: 126 MG/DL (ref 70–110)
HBA1C MFR BLD: 6.1 % (ref 4–5.6)
HCT VFR BLD AUTO: 45.2 % (ref 40–54)
HDLC SERPL-MCNC: 36 MG/DL (ref 40–75)
HDLC SERPL: 30.5 % (ref 20–50)
HGB BLD-MCNC: 14.8 G/DL (ref 14–18)
IMM GRANULOCYTES # BLD AUTO: 0.08 K/UL (ref 0–0.04)
IMM GRANULOCYTES NFR BLD AUTO: 1.2 % (ref 0–0.5)
LDLC SERPL CALC-MCNC: 59 MG/DL (ref 63–159)
LYMPHOCYTES # BLD AUTO: 1.6 K/UL (ref 1–4.8)
LYMPHOCYTES NFR BLD: 22.4 % (ref 18–48)
MCH RBC QN AUTO: 30 PG (ref 27–31)
MCHC RBC AUTO-ENTMCNC: 32.7 G/DL (ref 32–36)
MCV RBC AUTO: 92 FL (ref 82–98)
MONOCYTES # BLD AUTO: 0.8 K/UL (ref 0.3–1)
MONOCYTES NFR BLD: 12.2 % (ref 4–15)
NEUTROPHILS # BLD AUTO: 4.2 K/UL (ref 1.8–7.7)
NEUTROPHILS NFR BLD: 60.9 % (ref 38–73)
NONHDLC SERPL-MCNC: 82 MG/DL
NRBC BLD-RTO: 0 /100 WBC
PLATELET # BLD AUTO: 242 K/UL (ref 150–450)
PMV BLD AUTO: 10.5 FL (ref 9.2–12.9)
POTASSIUM SERPL-SCNC: 4.2 MMOL/L (ref 3.5–5.1)
PROT SERPL-MCNC: 7.5 G/DL (ref 6–8.4)
RBC # BLD AUTO: 4.93 M/UL (ref 4.6–6.2)
SODIUM SERPL-SCNC: 140 MMOL/L (ref 136–145)
T4 FREE SERPL-MCNC: 1.36 NG/DL (ref 0.71–1.51)
TRIGL SERPL-MCNC: 115 MG/DL (ref 30–150)
TSH SERPL DL<=0.005 MIU/L-ACNC: 0.11 UIU/ML (ref 0.4–4)
WBC # BLD AUTO: 6.91 K/UL (ref 3.9–12.7)

## 2024-11-05 PROCEDURE — 80061 LIPID PANEL: CPT | Mod: HCNC | Performed by: FAMILY MEDICINE

## 2024-11-05 PROCEDURE — 84443 ASSAY THYROID STIM HORMONE: CPT | Mod: HCNC | Performed by: FAMILY MEDICINE

## 2024-11-05 PROCEDURE — 36415 COLL VENOUS BLD VENIPUNCTURE: CPT | Mod: HCNC,PO | Performed by: FAMILY MEDICINE

## 2024-11-05 PROCEDURE — 85025 COMPLETE CBC W/AUTO DIFF WBC: CPT | Mod: HCNC | Performed by: FAMILY MEDICINE

## 2024-11-05 PROCEDURE — 84439 ASSAY OF FREE THYROXINE: CPT | Mod: HCNC | Performed by: FAMILY MEDICINE

## 2024-11-05 PROCEDURE — 80053 COMPREHEN METABOLIC PANEL: CPT | Mod: HCNC | Performed by: FAMILY MEDICINE

## 2024-11-05 PROCEDURE — 83036 HEMOGLOBIN GLYCOSYLATED A1C: CPT | Mod: HCNC | Performed by: FAMILY MEDICINE

## 2024-11-07 ENCOUNTER — PATIENT MESSAGE (OUTPATIENT)
Dept: FAMILY MEDICINE | Facility: CLINIC | Age: 69
End: 2024-11-07
Payer: MEDICARE

## 2024-11-07 DIAGNOSIS — R73.03 PRE-DIABETES: ICD-10-CM

## 2024-11-07 DIAGNOSIS — E03.9 HYPOTHYROIDISM, UNSPECIFIED TYPE: Primary | ICD-10-CM

## 2025-01-05 DIAGNOSIS — E78.1 HYPERTRIGLYCERIDEMIA: ICD-10-CM

## 2025-01-05 NOTE — TELEPHONE ENCOUNTER
No care due was identified.  Health [facility] Embedded Care Due Messages. Reference number: [id number].   1/05/2025 12:48:31 PM CST

## 2025-01-06 RX ORDER — ROSUVASTATIN CALCIUM 20 MG/1
20 TABLET, COATED ORAL
Qty: 90 TABLET | Refills: 3 | Status: SHIPPED | OUTPATIENT
Start: 2025-01-06

## 2025-02-03 ENCOUNTER — TELEPHONE (OUTPATIENT)
Dept: UROLOGY | Facility: CLINIC | Age: 70
End: 2025-02-03
Payer: MEDICARE

## 2025-02-03 NOTE — TELEPHONE ENCOUNTER
----- Message from Tobin sent at 2/3/2025  1:52 PM CST -----  Regarding: Earlier Time  Name of Who is Calling:  Pateint          What is the request in detail:  Patient would like an earlier appointment            Can the clinic reply by [facility]: No            What Number to Call Back if not in [facility]: [phone]

## 2025-02-04 ENCOUNTER — OFFICE VISIT (OUTPATIENT)
Dept: UROLOGY | Facility: CLINIC | Age: 70
End: 2025-02-04
Payer: MEDICARE

## 2025-02-04 DIAGNOSIS — R35.1 NOCTURIA: ICD-10-CM

## 2025-02-04 DIAGNOSIS — C61 PROSTATE CANCER: Primary | ICD-10-CM

## 2025-02-04 DIAGNOSIS — N52.8 OTHER MALE ERECTILE DYSFUNCTION: ICD-10-CM

## 2025-02-04 DIAGNOSIS — E29.1 HYPOGONADISM IN MALE: ICD-10-CM

## 2025-02-04 PROCEDURE — 1160F RVW MEDS BY RX/DR IN RCRD: CPT | Mod: HCNC,CPTII,S$GLB, | Performed by: UROLOGY

## 2025-02-04 PROCEDURE — 3288F FALL RISK ASSESSMENT DOCD: CPT | Mod: HCNC,CPTII,S$GLB, | Performed by: UROLOGY

## 2025-02-04 PROCEDURE — 1101F PT FALLS ASSESS-DOCD LE1/YR: CPT | Mod: HCNC,CPTII,S$GLB, | Performed by: UROLOGY

## 2025-02-04 PROCEDURE — G2211 COMPLEX E/M VISIT ADD ON: HCPCS | Mod: HCNC,S$GLB,, | Performed by: UROLOGY

## 2025-02-04 PROCEDURE — 99214 OFFICE O/P EST MOD 30 MIN: CPT | Mod: HCNC,S$GLB,, | Performed by: UROLOGY

## 2025-02-04 PROCEDURE — 1159F MED LIST DOCD IN RCRD: CPT | Mod: HCNC,CPTII,S$GLB, | Performed by: UROLOGY

## 2025-02-04 PROCEDURE — 4010F ACE/ARB THERAPY RXD/TAKEN: CPT | Mod: HCNC,CPTII,S$GLB, | Performed by: UROLOGY

## 2025-02-04 PROCEDURE — 99999 PR PBB SHADOW E&M-EST. PATIENT-LVL III: CPT | Mod: PBBFAC,HCNC,, | Performed by: UROLOGY

## 2025-02-04 PROCEDURE — 1125F AMNT PAIN NOTED PAIN PRSNT: CPT | Mod: HCNC,CPTII,S$GLB, | Performed by: UROLOGY

## 2025-02-04 NOTE — PROGRESS NOTES
Subjective:       Patient ID: Anthony Joseph Rockweiler is a 69 y.o. male The patient's last visit with me was on 10/31/2024.     Chief Complaint:   Chief Complaint   Patient presents with    Follow-up       Prostate Cancer  He underwent a prostate needle biopsy on 5/2/2023.  His biopsy was indicated due to: Elevated PSA.  Afterwards he experienced: Gross Hematuria.  These symptoms have resolved.  His PSA prior to biopsy was 29.5.  PSA Density 0.45. His prostate size was 60 grams.  The ultrasound did not show a median lobe.  He currently does have erectile dysfunction.  His pathology showed: Prostate Cancer.     6/22/2023  He is back with a new PSA, MRI, and PSMA PET scan.    9/26/2023  He met with Dr. Vuong.  They are waiting on his Polaris score.    1/25/2024  He has finished XRT followed by brachytherapy boost.  This concluded on 12/7/2023.  He has noted more hesitancy in the evening.    4/25/2024  He is doing okay.  He is working out, but is inquiring about timing of TRT.  He feels his mental and physical health is much better on TRT.    7/25/2024  He would like to get back on TRT.  He is having back aches, headaches, feeling poorly.    10/31/2024  He is back with a new set of labs.  He would like to restart TRT.  He feels very poorly with body aches, headaches, and poor wound healing.   He was taking testosterone cypionate 200 mg every week.  He has a history of a traumatic brain injury from many years ago.     02/04/2025  He feels better with testosterone, he is injecting 200 mg every 2 weeks.      ACTIVE MEDICAL ISSUES:  Patient Active Problem List   Diagnosis    History of seizures    Headache    Nuclear sclerosis of both eyes    Moderate benzodiazepine use disorder    Prostate cancer    Anxiety    Hypertensive disorder    Insomnia    Migraine    Low back pain       ALLERGIES AND MEDICATIONS: updated and reviewed.  Review of patient's allergies indicates:   Allergen Reactions    Paroxetine      Urinary  problems     Current Outpatient Medications   Medication Sig    amLODIPine (NORVASC) 5 MG tablet Take 5 mg by mouth 2 (two) times daily.    azithromycin (Z-KYLIE) 250 MG tablet TAKE 2 TABLETS BY MOUTH ON DAY 1, AND THEN TAKE 1 TABLET BY MOUTH ONCE A DAY ON DAY 2 THROUGH DAY 5    carvedilol (COREG) 12.5 MG tablet Take 12.5 mg by mouth 2 (two) times daily with meals.    fluticasone propionate (FLONASE) 50 mcg/actuation nasal spray 1 spray (50 mcg total) by Each Nostril route once daily.    fluticasone propionate (FLONASE) 50 mcg/actuation nasal spray 1 spray (50 mcg total) by Each Nostril route 2 (two) times daily as needed for Rhinitis.     mg tablet Take 800 mg by mouth every 8 (eight) hours.    levothyroxine (SYNTHROID) 137 MCG Tab tablet Take 1 tablet by mouth once daily.    levothyroxine (SYNTHROID) 137 MCG Tab tablet Take 1 tablet (137 mcg total) by mouth before breakfast.    LORazepam (ATIVAN) 1 MG tablet Take 1 tablet (1 mg total) by mouth 2 (two) times daily as needed for Anxiety.    LORazepam (ATIVAN) 1 MG tablet Take 1 tablet by mouth every 12 (twelve) hours as needed.    losartan (COZAAR) 100 MG tablet Take 100 mg by mouth every morning.    rosuvastatin (CRESTOR) 20 MG tablet TAKE 1 TABLET ONE TIME DAILY    testosterone cypionate (DEPOTESTOTERONE CYPIONATE) 200 mg/mL injection Inject 1 mL (200 mg total) into the muscle every 14 (fourteen) days.    valproic acid (DEPAKENE) 250 mg capsule Take 1 capsule (250 mg total) by mouth 4 (four) times daily.    cetirizine (ZYRTEC) 10 MG tablet Take 1 tablet (10 mg total) by mouth once daily.    tamsulosin (FLOMAX) 0.4 mg Cap Take 2 capsules (0.8 mg total) by mouth once daily.     No current facility-administered medications for this visit.       Review of Systems   Constitutional:  Negative for chills and fever.   HENT:  Negative for congestion.    Respiratory:  Negative for chest tightness and shortness of breath.    Cardiovascular:  Negative for chest pain and  palpitations.   Gastrointestinal:  Negative for abdominal pain, constipation, diarrhea, nausea and vomiting.   Genitourinary:  Negative for difficulty urinating, dysuria, flank pain, hematuria and urgency.   Musculoskeletal:  Negative for arthralgias.   Neurological:  Negative for dizziness.   Psychiatric/Behavioral:  Negative for confusion.        Objective:      There were no vitals filed for this visit.            Physical Exam  Vitals and nursing note reviewed.   Constitutional:       Appearance: He is well-developed.   HENT:      Head: Normocephalic.   Eyes:      Conjunctiva/sclera: Conjunctivae normal.   Neck:      Thyroid: No thyromegaly.      Trachea: No tracheal deviation.   Cardiovascular:      Rate and Rhythm: Normal rate.      Heart sounds: Normal heart sounds.   Pulmonary:      Effort: Pulmonary effort is normal. No respiratory distress.      Breath sounds: Normal breath sounds. No wheezing.   Abdominal:      General: Bowel sounds are normal.      Palpations: Abdomen is soft.      Tenderness: There is no abdominal tenderness. There is no rebound.      Hernia: No hernia is present.   Musculoskeletal:         General: No tenderness. Normal range of motion.      Cervical back: Normal range of motion and neck supple.   Lymphadenopathy:      Cervical: No cervical adenopathy.   Skin:     General: Skin is warm and dry.      Findings: No erythema or rash.   Neurological:      Mental Status: He is alert and oriented to person, place, and time.   Psychiatric:         Behavior: Behavior normal.         Thought Content: Thought content normal.         Judgment: Judgment normal.         Urine dipstick shows negative for all components.  Micro exam: negative for WBC's or RBC's.         Component Ref Range & Units 6 d ago   Testosterone, Total 304 - 1227 ng/dL 76 Low    Resulting Agency  [facility]              Narrative  Performed by: LASHA  PSA 3 months   Specimen Collected: 10/25/24 08:31 CDT Last Resulted: 10/25/24 17:10  CDT     Component Ref Range & Units 6 d ago  (10/25/24) 1 mo ago  (9/4/24) 3 mo ago  (7/19/24) 6 mo ago  (4/22/24) 8 mo ago  (2/19/24) 1 yr ago  (8/8/23) 1 yr ago  (8/4/23)   PSA Diagnostic 0.00 - 4.00 ng/mL 0.91 1.0 CM 1.7 CM 2.6 CM 4.2 High  CM 23.9 High  CM 19.8 High  CM   Comment: The testing method is a chemiluminescent microparticle immunoassay  manufactured by [employer] and performed on the [employer]  or  [employer] system. Values obtained with different assay manufacturers  for  methods may be different and cannot be used interchangeably.  PSA Expected levels:  Hormonal Therapy: <0.05 ng/ml  Prostatectomy: <0.01 ng/ml  Radiation Therapy: <1.00 ng/ml   Resulting Agency  [facility]              Narrative  Performed by: [facility]  PSA 3 months   Specimen Collected: 10/25/24 08:31 CDT         Assessment:       1. Prostate cancer    2. Hypogonadism in male    3. Other male erectile dysfunction    4. Nocturia                        Plan:       1. Prostate cancer (Primary)  Need to check his labs    - Prostate Specific Antigen, Diagnostic; Future  - Testosterone; Future  - Prostate Specific Antigen, Diagnostic; Future  - Testosterone; Future    2. Hypogonadism in male  Okay to continue TRT fow now    3. Other male erectile dysfunction  Viagra    4. Nocturia  Flomax     Visit today included increased complexity associated with the care of the episodic problem prostate cancer and hypogonadism addressed and managing the longitudinal care of the patient due to the serious and/or complex managed problem(s) .                Follow up in about 3 months (around 5/4/2025) for Follow up Established, Review labs.

## 2025-02-05 ENCOUNTER — PATIENT MESSAGE (OUTPATIENT)
Dept: UROLOGY | Facility: CLINIC | Age: 70
End: 2025-02-05
Payer: MEDICARE

## 2025-02-05 ENCOUNTER — LAB VISIT (OUTPATIENT)
Dept: LAB | Facility: HOSPITAL | Age: 70
End: 2025-02-05
Attending: UROLOGY
Payer: MEDICARE

## 2025-02-05 DIAGNOSIS — C61 PROSTATE CANCER: ICD-10-CM

## 2025-02-05 DIAGNOSIS — R73.03 PRE-DIABETES: ICD-10-CM

## 2025-02-05 DIAGNOSIS — E03.9 HYPOTHYROIDISM, UNSPECIFIED TYPE: ICD-10-CM

## 2025-02-05 DIAGNOSIS — E29.1 HYPOGONADISM IN MALE: ICD-10-CM

## 2025-02-05 LAB
ALBUMIN SERPL BCP-MCNC: 4 G/DL (ref 3.5–5.2)
ALP SERPL-CCNC: 75 U/L (ref 40–150)
ALT SERPL W/O P-5'-P-CCNC: 35 U/L (ref 10–44)
ANION GAP SERPL CALC-SCNC: 8 MMOL/L (ref 8–16)
AST SERPL-CCNC: 30 U/L (ref 10–40)
BASOPHILS # BLD AUTO: 0.08 K/UL (ref 0–0.2)
BASOPHILS NFR BLD: 1 % (ref 0–1.9)
BILIRUB SERPL-MCNC: 0.5 MG/DL (ref 0.1–1)
BUN SERPL-MCNC: 12 MG/DL (ref 8–23)
CALCIUM SERPL-MCNC: 9.6 MG/DL (ref 8.7–10.5)
CHLORIDE SERPL-SCNC: 106 MMOL/L (ref 95–110)
CO2 SERPL-SCNC: 25 MMOL/L (ref 23–29)
COMPLEXED PSA SERPL-MCNC: 1.9 NG/ML (ref 0–4)
COMPLEXED PSA SERPL-MCNC: 1.9 NG/ML (ref 0–4)
CREAT SERPL-MCNC: 1 MG/DL (ref 0.5–1.4)
DIFFERENTIAL METHOD BLD: ABNORMAL
EOSINOPHIL # BLD AUTO: 0.1 K/UL (ref 0–0.5)
EOSINOPHIL NFR BLD: 1.8 % (ref 0–8)
ERYTHROCYTE [DISTWIDTH] IN BLOOD BY AUTOMATED COUNT: 15.3 % (ref 11.5–14.5)
EST. GFR  (NO RACE VARIABLE): >60 ML/MIN/1.73 M^2
ESTIMATED AVG GLUCOSE: 123 MG/DL (ref 68–131)
GLUCOSE SERPL-MCNC: 106 MG/DL (ref 70–110)
HBA1C MFR BLD: 5.9 % (ref 4–5.6)
HCT VFR BLD AUTO: 53.1 % (ref 40–54)
HGB BLD-MCNC: 16.6 G/DL (ref 14–18)
IMM GRANULOCYTES # BLD AUTO: 0.05 K/UL (ref 0–0.04)
IMM GRANULOCYTES NFR BLD AUTO: 0.6 % (ref 0–0.5)
LYMPHOCYTES # BLD AUTO: 1.4 K/UL (ref 1–4.8)
LYMPHOCYTES NFR BLD: 17.1 % (ref 18–48)
MCH RBC QN AUTO: 26.7 PG (ref 27–31)
MCHC RBC AUTO-ENTMCNC: 31.3 G/DL (ref 32–36)
MCV RBC AUTO: 85 FL (ref 82–98)
MONOCYTES # BLD AUTO: 0.9 K/UL (ref 0.3–1)
MONOCYTES NFR BLD: 11.1 % (ref 4–15)
NEUTROPHILS # BLD AUTO: 5.4 K/UL (ref 1.8–7.7)
NEUTROPHILS NFR BLD: 68.4 % (ref 38–73)
NRBC BLD-RTO: 0 /100 WBC
PLATELET # BLD AUTO: 250 K/UL (ref 150–450)
PMV BLD AUTO: 10.2 FL (ref 9.2–12.9)
POTASSIUM SERPL-SCNC: 4.6 MMOL/L (ref 3.5–5.1)
PROT SERPL-MCNC: 7.6 G/DL (ref 6–8.4)
RBC # BLD AUTO: 6.22 M/UL (ref 4.6–6.2)
SODIUM SERPL-SCNC: 139 MMOL/L (ref 136–145)
T4 FREE SERPL-MCNC: 1.37 NG/DL (ref 0.71–1.51)
TESTOST SERPL-MCNC: 665 NG/DL (ref 304–1227)
TESTOST SERPL-MCNC: 665 NG/DL (ref 304–1227)
TSH SERPL DL<=0.005 MIU/L-ACNC: 0.22 UIU/ML (ref 0.4–4)
WBC # BLD AUTO: 7.95 K/UL (ref 3.9–12.7)

## 2025-02-05 PROCEDURE — 84439 ASSAY OF FREE THYROXINE: CPT | Mod: HCNC | Performed by: FAMILY MEDICINE

## 2025-02-05 PROCEDURE — 36415 COLL VENOUS BLD VENIPUNCTURE: CPT | Mod: HCNC,PO | Performed by: UROLOGY

## 2025-02-05 PROCEDURE — 84443 ASSAY THYROID STIM HORMONE: CPT | Mod: HCNC | Performed by: FAMILY MEDICINE

## 2025-02-05 PROCEDURE — 83036 HEMOGLOBIN GLYCOSYLATED A1C: CPT | Mod: HCNC | Performed by: FAMILY MEDICINE

## 2025-02-05 PROCEDURE — 80053 COMPREHEN METABOLIC PANEL: CPT | Mod: HCNC | Performed by: UROLOGY

## 2025-02-05 PROCEDURE — 84403 ASSAY OF TOTAL TESTOSTERONE: CPT | Mod: HCNC | Performed by: UROLOGY

## 2025-02-05 PROCEDURE — 85025 COMPLETE CBC W/AUTO DIFF WBC: CPT | Mod: HCNC | Performed by: UROLOGY

## 2025-02-05 PROCEDURE — 84153 ASSAY OF PSA TOTAL: CPT | Mod: HCNC | Performed by: UROLOGY

## 2025-02-06 ENCOUNTER — PATIENT MESSAGE (OUTPATIENT)
Dept: FAMILY MEDICINE | Facility: CLINIC | Age: 70
End: 2025-02-06
Payer: MEDICARE

## 2025-02-06 DIAGNOSIS — E03.9 HYPOTHYROIDISM, UNSPECIFIED TYPE: Primary | ICD-10-CM

## 2025-02-06 RX ORDER — LEVOTHYROXINE SODIUM 125 UG/1
125 TABLET ORAL
Qty: 30 TABLET | Refills: 11 | Status: SHIPPED | OUTPATIENT
Start: 2025-02-06 | End: 2026-02-06

## 2025-02-06 NOTE — TELEPHONE ENCOUNTER
If you stay on it, at least until next visit, then I can tell better if you have incompletely treated prostate cancer.      I am okay with you staying on it for now, but I also understand if you decide to stop.    Stopping Testosterone will likely allow your PSA to go back down.      If you stop, then you should stay off of it.  This may be a permanent cessation of testosterone replacement.    Dr. Foley

## 2025-03-17 ENCOUNTER — OFFICE VISIT (OUTPATIENT)
Dept: FAMILY MEDICINE | Facility: CLINIC | Age: 70
End: 2025-03-17
Payer: MEDICARE

## 2025-03-17 VITALS
OXYGEN SATURATION: 96 % | DIASTOLIC BLOOD PRESSURE: 78 MMHG | HEART RATE: 75 BPM | SYSTOLIC BLOOD PRESSURE: 118 MMHG | TEMPERATURE: 98 F | HEIGHT: 68 IN | BODY MASS INDEX: 26.14 KG/M2 | WEIGHT: 172.5 LBS

## 2025-03-17 DIAGNOSIS — K62.5 RECTAL BLEEDING: ICD-10-CM

## 2025-03-17 DIAGNOSIS — E03.9 HYPOTHYROIDISM, UNSPECIFIED TYPE: ICD-10-CM

## 2025-03-17 DIAGNOSIS — E29.1 HYPOGONADISM IN MALE: ICD-10-CM

## 2025-03-17 DIAGNOSIS — H93.19 TINNITUS, UNSPECIFIED LATERALITY: ICD-10-CM

## 2025-03-17 DIAGNOSIS — Z12.11 SCREENING FOR COLON CANCER: ICD-10-CM

## 2025-03-17 DIAGNOSIS — F41.1 GAD (GENERALIZED ANXIETY DISORDER): ICD-10-CM

## 2025-03-17 DIAGNOSIS — K63.5 POLYP OF COLON, UNSPECIFIED PART OF COLON, UNSPECIFIED TYPE: Primary | ICD-10-CM

## 2025-03-17 DIAGNOSIS — F13.20 MODERATE BENZODIAZEPINE USE DISORDER: ICD-10-CM

## 2025-03-17 DIAGNOSIS — K59.00 CONSTIPATION, UNSPECIFIED CONSTIPATION TYPE: ICD-10-CM

## 2025-03-17 DIAGNOSIS — I10 ESSENTIAL HYPERTENSION: ICD-10-CM

## 2025-03-17 DIAGNOSIS — C61 PROSTATE CANCER: ICD-10-CM

## 2025-03-17 PROCEDURE — 1101F PT FALLS ASSESS-DOCD LE1/YR: CPT | Mod: HCNC,CPTII,S$GLB, | Performed by: FAMILY MEDICINE

## 2025-03-17 PROCEDURE — 99999 PR PBB SHADOW E&M-EST. PATIENT-LVL V: CPT | Mod: PBBFAC,HCNC,, | Performed by: FAMILY MEDICINE

## 2025-03-17 PROCEDURE — 1126F AMNT PAIN NOTED NONE PRSNT: CPT | Mod: HCNC,CPTII,S$GLB, | Performed by: FAMILY MEDICINE

## 2025-03-17 PROCEDURE — 3078F DIAST BP <80 MM HG: CPT | Mod: HCNC,CPTII,S$GLB, | Performed by: FAMILY MEDICINE

## 2025-03-17 PROCEDURE — 1160F RVW MEDS BY RX/DR IN RCRD: CPT | Mod: HCNC,CPTII,S$GLB, | Performed by: FAMILY MEDICINE

## 2025-03-17 PROCEDURE — 3008F BODY MASS INDEX DOCD: CPT | Mod: HCNC,CPTII,S$GLB, | Performed by: FAMILY MEDICINE

## 2025-03-17 PROCEDURE — G2211 COMPLEX E/M VISIT ADD ON: HCPCS | Mod: HCNC,S$GLB,, | Performed by: FAMILY MEDICINE

## 2025-03-17 PROCEDURE — 1159F MED LIST DOCD IN RCRD: CPT | Mod: HCNC,CPTII,S$GLB, | Performed by: FAMILY MEDICINE

## 2025-03-17 PROCEDURE — 3288F FALL RISK ASSESSMENT DOCD: CPT | Mod: HCNC,CPTII,S$GLB, | Performed by: FAMILY MEDICINE

## 2025-03-17 PROCEDURE — 4010F ACE/ARB THERAPY RXD/TAKEN: CPT | Mod: HCNC,CPTII,S$GLB, | Performed by: FAMILY MEDICINE

## 2025-03-17 PROCEDURE — 3044F HG A1C LEVEL LT 7.0%: CPT | Mod: HCNC,CPTII,S$GLB, | Performed by: FAMILY MEDICINE

## 2025-03-17 PROCEDURE — 99214 OFFICE O/P EST MOD 30 MIN: CPT | Mod: HCNC,S$GLB,, | Performed by: FAMILY MEDICINE

## 2025-03-17 PROCEDURE — 3074F SYST BP LT 130 MM HG: CPT | Mod: HCNC,CPTII,S$GLB, | Performed by: FAMILY MEDICINE

## 2025-03-17 NOTE — PROGRESS NOTES
"Assessment & Plan:    Polyp of colon, unspecified part of colon, unspecified type  Screening for colon cancer  -     Ambulatory referral/consult to Gastroenterology; Future; Expected date: 03/24/2025    Advised Offered to set up colonoscopy through our office but patient would like to have this done by Dr. Toney.     Hypothyroidism, unspecified type  Patient reporting constipation since change in Synthroid dose. Last thyroid studies showing improvement but suppression of the TSH, T4 in normal range. He has repeat thyroid studies in May.     Constipation, unspecified constipation type  Rectal bleeding   -     Ambulatory referral/consult to Gastroenterology; Future; Expected date: 03/24/2025    Continue fiber supplementation. Rectal bleeding possibly due to hemorrhoids but repeat C-scope was advised as above.    Essential hypertension  Controlled. Continue current therapy.     RONDA (generalized anxiety disorder)  Moderate benzodiazepine use disorder  Controlled. Continue current therapy.     Prostate cancer  Hypogonadism in male  Monitored by Urology. Patient decided to D/C testosterone out of concern for elevation in PSA.    Tinnitus, unspecified laterality  Recommended OTC Lipo-Flavonoid    Health maintenance reviewed & addressed above.     Follow-up: Follow up in about 6 months (around 9/17/2025).  ______________________________________________________________________    Chief Complaint  Chief Complaint   Patient presents with    Health Maintenance     6 month follow up        HPI  Anthony Joseph Rockweiler is a 69 y.o. male with medical diagnoses as listed in the medical history and problem list that presents to the office to follow up on his chronic conditions. He states that he "is not doing great". He stopped taking his testosterone in January out of concern for an elevation in his PSA. He has a return of his chronic LBP, constipation with scant rectal bleeding, tinnitus, and lower energy level. He believes " his constipation is related to the lower dose of Synthroid. Taking Metamucil and keeping a high fiber diet. Overdue for a colonoscopy. Previous colonoscopy done by Dr. Mullen showed polyps in 2017, repeat due in 3 years - he would like to have repeat C-scope done by Dr. Toney. Regarding tinnitus, he endorses known history of hearing loss.     Health Maintenance         Date Due Completion Date    Shingles Vaccine (1 of 2) Never done ---    Pneumococcal Vaccines (Age 50+) (1 of 2 - PCV) Never done ---    TETANUS VACCINE 09/09/2015 9/9/2005    RSV Vaccine (Age 60+ and Pregnant patients) (1 - Risk 60-74 years 1-dose series) Never done ---    Colorectal Cancer Screening 10/02/2020 10/2/2017    COVID-19 Vaccine (4 - 2024-25 season) 09/01/2024 1/28/2022    Hemoglobin A1c (Prediabetes) 02/05/2026 2/5/2025    Lipid Panel 11/05/2029 11/5/2024              PAST MEDICAL HISTORY:  Past Medical History:   Diagnosis Date    Headache     migraines    Hx of colonic polyp 10/02/2017    Hx of psychiatric care     Hypertension     Prostate cancer     Psychiatric problem     Seizures     Therapy        PAST SURGICAL HISTORY:  Past Surgical History:   Procedure Laterality Date    PROSTATE BIOPSY  05/02/2023    RADIOACTIVE SEED IMPLANTATION N/A 12/7/2023    Procedure: INSERTION, RADIOACTIVE SEED;  Surgeon: Deon Guzman MD;  Location: [address];  Service: Urology;  Laterality: N/A;    TRANSRECTAL ULTRASOUND EXAMINATION N/A 12/7/2023    Procedure: ULTRASOUND, RECTAL APPROACH;  Surgeon: Deon Guzman MD;  Location: [address];  Service: Urology;  Laterality: N/A;       SOCIAL HISTORY:  Social History[1]    FAMILY HISTORY:  Family History   Problem Relation Name Age of Onset    No Known Problems Mother      No Known Problems Father      No Known Problems Sister      No Known Problems Brother      No Known Problems Maternal Aunt      No Known Problems Maternal Uncle      No Known Problems Paternal Aunt      No  "Known Problems Paternal Uncle      No Known Problems Maternal Grandmother      No Known Problems Maternal Grandfather      No Known Problems Paternal Grandmother      No Known Problems Paternal Grandfather      Suicide Other nephew     Dementia Neg Hx         ALLERGIES AND MEDICATIONS: updated and reviewed.  Review of patient's allergies indicates:   Allergen Reactions    Paroxetine      Urinary problems     Current Medications[2]      ROS  Review of Systems   Constitutional:  Negative for activity change.   HENT:  Positive for hearing loss and tinnitus.    Gastrointestinal:  Positive for anal bleeding and constipation.   Musculoskeletal:  Positive for back pain.   Psychiatric/Behavioral:  Negative for dysphoric mood. The patient is not nervous/anxious.            Physical Exam  Vitals:    03/17/25 1047   BP: 118/78   Pulse: 75   Temp: 98.2 °F (36.8 °C)   TempSrc: Oral   SpO2: 96%   Weight: 78.3 kg (172 lb 8.2 oz)   Height: 5' 8" (1.727 m)    Body mass index is 26.23 kg/m².  Weight: 78.3 kg (172 lb 8.2 oz)   Height: 5' 8" (172.7 cm)   Physical Exam  Constitutional:       General: He is not in acute distress.  HENT:      Head: Normocephalic and atraumatic.   Skin:     General: Skin is warm and dry.   Neurological:      Mental Status: He is alert. Mental status is at baseline.   Psychiatric:         Mood and Affect: Mood normal.         Behavior: Behavior normal.                  [1]   Social History  Socioeconomic History    Marital status:     Number of children: 2   Tobacco Use    Smoking status: Never    Smokeless tobacco: Never   Substance and Sexual Activity    Alcohol use: No    Drug use: No    Sexual activity: Yes     Partners: Female     Birth control/protection: Condom     Social Drivers of Health     Financial Resource Strain: Medium Risk (3/10/2025)    Overall Financial Resource Strain (CARDIA)     Difficulty of Paying Living Expenses: Somewhat hard   Food Insecurity: Food Insecurity Present " (3/10/2025)    Hunger Vital Sign     Worried About Running Out of Food in the Last Year: Sometimes true     Ran Out of Food in the Last Year: Patient declined   Transportation Needs: Patient Declined (3/10/2025)    PRAPARE - Transportation     Lack of Transportation (Medical): Patient declined     Lack of Transportation (Non-Medical): Patient declined   Physical Activity: Sufficiently Active (3/10/2025)    Exercise Vital Sign     Days of Exercise per Week: 6 days     Minutes of Exercise per Session: 30 min   Stress: Stress Concern Present (3/10/2025)    [employer] [place] [employer] - Occupational Stress Questionnaire     Feeling of Stress : Rather much   Housing Stability: High Risk (3/10/2025)    Housing Stability Vital Sign     Unable to Pay for Housing in the Last Year: Yes     Homeless in the Last Year: Patient declined   [2]   Current Outpatient Medications   Medication Sig Dispense Refill    amLODIPine (NORVASC) 5 MG tablet Take 5 mg by mouth 2 (two) times daily.  3    carvedilol (COREG) 12.5 MG tablet Take 12.5 mg by mouth 2 (two) times daily with meals.      fluticasone propionate (FLONASE) 50 mcg/actuation nasal spray 1 spray (50 mcg total) by Each Nostril route once daily. 16 g 0    fluticasone propionate (FLONASE) 50 mcg/actuation nasal spray 1 spray (50 mcg total) by Each Nostril route 2 (two) times daily as needed for Rhinitis. 15 g 0     mg tablet Take 800 mg by mouth every 8 (eight) hours.      levothyroxine (SYNTHROID) 125 MCG tablet Take 1 tablet (125 mcg total) by mouth before breakfast. 30 tablet 11    LORazepam (ATIVAN) 1 MG tablet Take 1 tablet (1 mg total) by mouth 2 (two) times daily as needed for Anxiety. 60 tablet 2    LORazepam (ATIVAN) 1 MG tablet Take 1 tablet by mouth every 12 (twelve) hours as needed.      losartan (COZAAR) 100 MG tablet Take 100 mg by mouth every morning.      rosuvastatin (CRESTOR) 20 MG tablet TAKE 1 TABLET ONE TIME DAILY 90 tablet 3    valproic  acid (DEPAKENE) 250 mg capsule Take 1 capsule (250 mg total) by mouth 4 (four) times daily. 120 capsule 11    cetirizine (ZYRTEC) 10 MG tablet Take 1 tablet (10 mg total) by mouth once daily. 30 tablet 0    tamsulosin (FLOMAX) 0.4 mg Cap Take 2 capsules (0.8 mg total) by mouth once daily. 180 capsule 3     No current facility-administered medications for this visit.

## 2025-03-27 LAB — CRC RECOMMENDATION EXT: NORMAL

## 2025-04-04 ENCOUNTER — PATIENT OUTREACH (OUTPATIENT)
Dept: ADMINISTRATIVE | Facility: HOSPITAL | Age: 70
End: 2025-04-04
Payer: MEDICARE

## 2025-04-04 NOTE — PROGRESS NOTES
External colonoscopy report received, uploaded to chart and hyper-linked in    review of patient information including recent vital signs, labs, imaging, and notes; assessing, examining patient; updating patient/family; discussion and coordination of care with multidisciplinary team.

## 2025-05-05 ENCOUNTER — LAB VISIT (OUTPATIENT)
Dept: LAB | Facility: HOSPITAL | Age: 70
End: 2025-05-05
Attending: UROLOGY
Payer: MEDICARE

## 2025-05-05 DIAGNOSIS — C61 PROSTATE CANCER: ICD-10-CM

## 2025-05-05 DIAGNOSIS — E03.9 HYPOTHYROIDISM, UNSPECIFIED TYPE: ICD-10-CM

## 2025-05-05 LAB
PSA SERPL-MCNC: 0.62 NG/ML
T4 FREE SERPL-MCNC: 1.2 NG/DL (ref 0.71–1.51)
TESTOST SERPL-MCNC: 85 NG/DL (ref 304–1227)
TSH SERPL-ACNC: 0.08 UIU/ML (ref 0.4–4)

## 2025-05-05 PROCEDURE — 84439 ASSAY OF FREE THYROXINE: CPT | Mod: HCNC

## 2025-05-05 PROCEDURE — 84403 ASSAY OF TOTAL TESTOSTERONE: CPT | Mod: HCNC

## 2025-05-05 PROCEDURE — 84443 ASSAY THYROID STIM HORMONE: CPT | Mod: HCNC

## 2025-05-05 PROCEDURE — 36415 COLL VENOUS BLD VENIPUNCTURE: CPT | Mod: HCNC,PO

## 2025-05-05 PROCEDURE — 84153 ASSAY OF PSA TOTAL: CPT | Mod: HCNC

## 2025-05-09 ENCOUNTER — E-CONSULT (OUTPATIENT)
Dept: ENDOCRINOLOGY | Facility: CLINIC | Age: 70
End: 2025-05-09
Payer: MEDICARE

## 2025-05-09 ENCOUNTER — RESULTS FOLLOW-UP (OUTPATIENT)
Dept: FAMILY MEDICINE | Facility: CLINIC | Age: 70
End: 2025-05-09
Payer: MEDICARE

## 2025-05-09 DIAGNOSIS — E05.80 IATROGENIC HYPERTHYROIDISM: Primary | ICD-10-CM

## 2025-05-09 DIAGNOSIS — E03.9 HYPOTHYROIDISM, UNSPECIFIED TYPE: Primary | ICD-10-CM

## 2025-05-09 NOTE — CONSULTS
[facility] - [facility]  Response for E-Consult     Patient Name: Anthony Joseph Rockweiler  MRN: [id number]  Primary Care Provider: Myla Bryson DO   Requesting Provider: Myla Bryson DO  E-Consult to Endocrinology  Consult performed by: Andriy Jordan DO  Consult ordered by: Myla Byrson DO  Reason for consult: Hypothyroidism and Dosing with Abnormal Labs  Assessment/Recommendations: See Note      I have reviewed the chart, labs and thyroid hormone dosing.  I note that at one point he was on 112 mcg dose in 2019 but no labs at that time for review to see if he had normalization of TSH.  Weight based dosing of thyroid hormone would be expected to be around 125 but sometimes after thyroid surgery there can be some remnant thyroid tissue that is also able to produce thyroid hormone leading to lower than weight predicted dosing.      Latest Reference Range & Units 08/08/23 07:58 01/25/24 08:12 08/19/24 08:24 10/25/24 08:31 11/05/24 07:23 02/05/25 08:26 05/05/25 08:33   TSH 0.400 - 4.000 uIU/mL 0.062 (L) 0.038 (L) <0.010 (L) 0.046 (L) 0.107 (L) 0.224 (L) 0.084 (L)   T3, Total 60 - 180 ng/dL 88         Free T4 0.71 - 1.51 ng/dL 1.40 1.50 1.53 (H) 1.47 1.36 1.37 1.20   (L): Data is abnormally low  (H): Data is abnormally high    My suggestion would be to further lower thyroid medication dose from 125 to 112 mcg dose.  Would then repeat TSH 6-8 weeks later.  Goal would be normalization of TSH as it is the best marker for us in evaluating thyroid hormone adequacy.  If he has normalization of thyroid labs with dose reduction but symptoms suggestive of hypothyroidism then a possible regimen of T4/T3 dosing could be considered, ideally with synthetic options (levothyroxine/cytomel).      Reach out if questions    Total time of Consultation: 10 minute    I did not speak to the requesting provider verbally about this.     *This eConsult is based on the clinical data available to me and is furnished  without benefit of a physical examination. The eConsult will need to be interpreted in light of any clinical issues or changes in patient status not available to me at the time of filing this eConsults. Significant changes in patient condition or level of acuity should result in immediate formal consultation and reevaluation. Please alert me if you have further questions.    Thank you for this eConsult referral.     DO Andrey Ridley deneen - [facility]

## 2025-05-12 ENCOUNTER — PATIENT MESSAGE (OUTPATIENT)
Dept: FAMILY MEDICINE | Facility: CLINIC | Age: 70
End: 2025-05-12
Payer: MEDICARE

## 2025-05-12 DIAGNOSIS — E03.9 HYPOTHYROIDISM, UNSPECIFIED TYPE: Primary | ICD-10-CM

## 2025-05-12 RX ORDER — LEVOTHYROXINE SODIUM 112 UG/1
112 TABLET ORAL
Qty: 30 TABLET | Refills: 11 | Status: SHIPPED | OUTPATIENT
Start: 2025-05-12 | End: 2026-05-12

## 2025-05-13 ENCOUNTER — TELEPHONE (OUTPATIENT)
Dept: UROLOGY | Facility: CLINIC | Age: 70
End: 2025-05-13

## 2025-05-13 ENCOUNTER — OFFICE VISIT (OUTPATIENT)
Dept: UROLOGY | Facility: CLINIC | Age: 70
End: 2025-05-13
Payer: MEDICARE

## 2025-05-13 DIAGNOSIS — E29.1 HYPOGONADISM IN MALE: ICD-10-CM

## 2025-05-13 DIAGNOSIS — N52.8 OTHER MALE ERECTILE DYSFUNCTION: ICD-10-CM

## 2025-05-13 DIAGNOSIS — R35.1 NOCTURIA: ICD-10-CM

## 2025-05-13 DIAGNOSIS — C61 PROSTATE CANCER: Primary | ICD-10-CM

## 2025-05-13 PROCEDURE — 99999 PR PBB SHADOW E&M-EST. PATIENT-LVL III: CPT | Mod: PBBFAC,HCNC,, | Performed by: UROLOGY

## 2025-05-13 PROCEDURE — 3044F HG A1C LEVEL LT 7.0%: CPT | Mod: CPTII,HCNC,S$GLB, | Performed by: UROLOGY

## 2025-05-13 PROCEDURE — 1126F AMNT PAIN NOTED NONE PRSNT: CPT | Mod: CPTII,HCNC,S$GLB, | Performed by: UROLOGY

## 2025-05-13 PROCEDURE — 4010F ACE/ARB THERAPY RXD/TAKEN: CPT | Mod: CPTII,HCNC,S$GLB, | Performed by: UROLOGY

## 2025-05-13 PROCEDURE — 1159F MED LIST DOCD IN RCRD: CPT | Mod: CPTII,HCNC,S$GLB, | Performed by: UROLOGY

## 2025-05-13 PROCEDURE — 3288F FALL RISK ASSESSMENT DOCD: CPT | Mod: CPTII,HCNC,S$GLB, | Performed by: UROLOGY

## 2025-05-13 PROCEDURE — 1101F PT FALLS ASSESS-DOCD LE1/YR: CPT | Mod: CPTII,HCNC,S$GLB, | Performed by: UROLOGY

## 2025-05-13 PROCEDURE — 99214 OFFICE O/P EST MOD 30 MIN: CPT | Mod: HCNC,S$GLB,, | Performed by: UROLOGY

## 2025-05-13 PROCEDURE — 1160F RVW MEDS BY RX/DR IN RCRD: CPT | Mod: CPTII,HCNC,S$GLB, | Performed by: UROLOGY

## 2025-05-13 RX ORDER — TESTOSTERONE CYPIONATE 200 MG/ML
100 INJECTION, SOLUTION INTRAMUSCULAR
Qty: 10 ML | Refills: 0 | Status: SHIPPED | OUTPATIENT
Start: 2025-05-13 | End: 2025-05-13 | Stop reason: SDUPTHER

## 2025-05-13 RX ORDER — TESTOSTERONE CYPIONATE 200 MG/ML
100 INJECTION, SOLUTION INTRAMUSCULAR
Qty: 10 ML | Refills: 0 | Status: SHIPPED | OUTPATIENT
Start: 2025-05-13 | End: 2025-11-11

## 2025-05-13 NOTE — TELEPHONE ENCOUNTER
----- Message from Velasquez sent at 5/13/2025 11:56 AM CDT -----  Regarding: Self   Type: RX Refill RequestWho Called: Self Have you contacted your pharmacy: yes RefillRX Name and Strength:testosterone cypionate (DEPOTESTOTERONE CYPIONATE) 200 mg/mL injection Preferred Pharmacy with phone number: .[facility] - [facility] [address] [facility] [address]Bmcku: [phone] Fax: [phone] or Mail Order: mail order Would the patient rather a call back or a response via [facility]? Call back Best Call Back Number:.[phone] Information:  pt said he can get its cheaper if its delivered Thank you.  ----- Message -----  From: Velasquez Newton  Sent: 5/13/2025  11:56 AM CDT  To: Og Ordonez Staff  Subject: Self                                              Type: RX Refill RequestWho Called: Self Have you contacted your pharmacy: yes RefillRX Name and Strength:testosterone cypionate (DEPOTESTOTERONE CYPIONATE) 200 mg/mL injection Preferred Pharmacy with phone number: .[facility] - [facility] [address] [facility] [address]Rpkea: [phone] Fax: [phone] or Mail Order: mail order Would the patient rather a call back or a response via [facility]? Call back Best Call Back Number:.[phone] Information:  pt said he can get ity cheaper if its delivered Thank you.

## 2025-05-13 NOTE — PROGRESS NOTES
Subjective:       Patient ID: Anthony Joseph Rockweiler is a 70 y.o. male The patient's last visit with me was on 2/4/2025.     Chief Complaint:   No chief complaint on file.      Prostate Cancer  He underwent a prostate needle biopsy on 5/2/2023.  His biopsy was indicated due to: Elevated PSA.  Afterwards he experienced: Gross Hematuria.  These symptoms have resolved.  His PSA prior to biopsy was 29.5.  PSA Density 0.45. His prostate size was 60 grams.  The ultrasound did not show a median lobe.  He currently does have erectile dysfunction.  His pathology showed: Prostate Cancer.     6/22/2023  He is back with a new PSA, MRI, and PSMA PET scan.    9/26/2023  He met with Dr. Vuong.  They are waiting on his Polaris score.    1/25/2024  He has finished XRT followed by brachytherapy boost.  This concluded on 12/7/2023.  He has noted more hesitancy in the evening.    4/25/2024  He is doing okay.  He is working out, but is inquiring about timing of TRT.  He feels his mental and physical health is much better on TRT.    7/25/2024  He would like to get back on TRT.  He is having back aches, headaches, feeling poorly.    10/31/2024  He is back with a new set of labs.  He would like to restart TRT.  He feels very poorly with body aches, headaches, and poor wound healing.   He was taking testosterone cypionate 200 mg every week.  He has a history of a traumatic brain injury from many years ago.     2/4/2025  He feels better with testosterone, he is injecting 200 mg every 2 weeks.    05/13/2025  He has had some stomach issues and may need surgery for a hiatal hernia.  He stopped his TRT after last visit.  He feels poorly with a lot of headaches and poor sleep.      ACTIVE MEDICAL ISSUES:  Patient Active Problem List   Diagnosis    History of seizures    Headache    Nuclear sclerosis of both eyes    Moderate benzodiazepine use disorder    Prostate cancer    Anxiety    Hypertensive disorder    Insomnia    Migraine    Low  back pain       ALLERGIES AND MEDICATIONS: updated and reviewed.  Review of patient's allergies indicates:   Allergen Reactions    Paroxetine      Urinary problems     Current Outpatient Medications   Medication Sig    amLODIPine (NORVASC) 5 MG tablet Take 5 mg by mouth 2 (two) times daily.    carvedilol (COREG) 12.5 MG tablet Take 12.5 mg by mouth 2 (two) times daily with meals.    fluticasone propionate (FLONASE) 50 mcg/actuation nasal spray 1 spray (50 mcg total) by Each Nostril route once daily.    fluticasone propionate (FLONASE) 50 mcg/actuation nasal spray 1 spray (50 mcg total) by Each Nostril route 2 (two) times daily as needed for Rhinitis.     mg tablet Take 800 mg by mouth every 8 (eight) hours.    levothyroxine (SYNTHROID) 112 MCG tablet Take 1 tablet (112 mcg total) by mouth before breakfast.    LORazepam (ATIVAN) 1 MG tablet Take 1 tablet (1 mg total) by mouth 2 (two) times daily as needed for Anxiety.    LORazepam (ATIVAN) 1 MG tablet Take 1 tablet by mouth every 12 (twelve) hours as needed.    losartan (COZAAR) 100 MG tablet Take 100 mg by mouth every morning.    rosuvastatin (CRESTOR) 20 MG tablet TAKE 1 TABLET ONE TIME DAILY    valproic acid (DEPAKENE) 250 mg capsule Take 1 capsule (250 mg total) by mouth 4 (four) times daily.    cetirizine (ZYRTEC) 10 MG tablet Take 1 tablet (10 mg total) by mouth once daily.    tamsulosin (FLOMAX) 0.4 mg Cap Take 2 capsules (0.8 mg total) by mouth once daily.    testosterone cypionate (DEPOTESTOTERONE CYPIONATE) 200 mg/mL injection Inject 0.5 mLs (100 mg total) into the muscle every 7 days.     No current facility-administered medications for this visit.       Review of Systems   Constitutional:  Negative for chills and fever.   HENT:  Negative for congestion.    Respiratory:  Negative for chest tightness and shortness of breath.    Cardiovascular:  Negative for chest pain and palpitations.   Gastrointestinal:  Negative for abdominal pain, constipation,  diarrhea, nausea and vomiting.   Genitourinary:  Negative for difficulty urinating, dysuria, flank pain, hematuria and urgency.   Musculoskeletal:  Negative for arthralgias.   Neurological:  Negative for dizziness.   Psychiatric/Behavioral:  Negative for confusion.        Objective:      There were no vitals filed for this visit.            Physical Exam  Vitals and nursing note reviewed.   Constitutional:       Appearance: He is well-developed.   HENT:      Head: Normocephalic.   Eyes:      Conjunctiva/sclera: Conjunctivae normal.   Neck:      Thyroid: No thyromegaly.      Trachea: No tracheal deviation.   Cardiovascular:      Rate and Rhythm: Normal rate.      Heart sounds: Normal heart sounds.   Pulmonary:      Effort: Pulmonary effort is normal. No respiratory distress.      Breath sounds: Normal breath sounds. No wheezing.   Abdominal:      General: Bowel sounds are normal.      Palpations: Abdomen is soft.      Tenderness: There is no abdominal tenderness. There is no rebound.      Hernia: No hernia is present.   Musculoskeletal:         General: No tenderness. Normal range of motion.      Cervical back: Normal range of motion and neck supple.   Lymphadenopathy:      Cervical: No cervical adenopathy.   Skin:     General: Skin is warm and dry.      Findings: No erythema or rash.   Neurological:      Mental Status: He is alert and oriented to person, place, and time.   Psychiatric:         Behavior: Behavior normal.         Thought Content: Thought content normal.         Judgment: Judgment normal.         Urine dipstick shows negative for all components.  Micro exam: negative for WBC's or RBC's.         Component  Ref Range & Units (hover) 8 d ago 2 yr ago   Prostate Specific Antigen 0.62 29.5 High  R, CM   Comment: PSA Expected levels:  Hormonal therapy: < 0.05 ng/mL  Prostatectomy: < 0.01 ng/mL  Radiation therapy: < 1.00 ng/mL   Resulting Agency [facility]              Narrative  Performed by: [facility]  The  testing method is a chemiluminescent microparticle immunoassay manufactured by [employer] and performed on the [employer] or [employer] system. Values obtained with different assay manufacturers for methods may be different and cannot be used interchangeably.   Specimen Collected: 05/05/25 08:33 CDT Last Resulted: 05/05/25 14:18 CDT       Component Ref Range & Units 6 d ago  (10/25/24) 1 mo ago  (9/4/24) 3 mo ago  (7/19/24) 6 mo ago  (4/22/24) 8 mo ago  (2/19/24) 1 yr ago  (8/8/23) 1 yr ago  (8/4/23)   PSA Diagnostic 0.00 - 4.00 ng/mL 0.91 1.0 CM 1.7 CM 2.6 CM 4.2 High  CM 23.9 High  CM 19.8 High  CM   Comment: The testing method is a chemiluminescent microparticle immunoassay  manufactured by [employer] and performed on the [employer]  or  [employer] system. Values obtained with different assay manufacturers  for  methods may be different and cannot be used interchangeably.  PSA Expected levels:  Hormonal Therapy: <0.05 ng/ml  Prostatectomy: <0.01 ng/ml  Radiation Therapy: <1.00 ng/ml   Resulting Agency  [facility]              Narrative  Performed by: [facility]  PSA 3 months   Specimen Collected: 10/25/24 08:31 CDT            Component  Ref Range & Units (hover) 8 d ago  (5/5/25) 3 mo ago  (2/5/25) 3 mo ago  (2/5/25) 6 mo ago  (10/25/24)   Testosterone Total 85 Low  665 R 665 R 76 Low  R   Resulting Agency [facility]             Specimen Collected: 05/05/25 08:33 CDT Last Resulted: 05/05/25 15:25 CDT       Assessment:       1. Prostate cancer    2. Hypogonadism in male    3. Nocturia    4. Other male erectile dysfunction                          Plan:       1. Prostate cancer (Primary)  Monitor PSA    - Prostate Specific Antigen, Diagnostic; Future  - Testosterone,Total; Future  - Comprehensive Metabolic Panel; Future  - CBC Auto Differential; Future    2. Hypogonadism in male  Okay to resume with close monitoring  - testosterone cypionate (DEPOTESTOTERONE CYPIONATE) 200  mg/mL injection; Inject 0.5 mLs (100 mg total) into the muscle every 7 days.  Dispense: 10 mL; Refill: 0    3. Nocturia  stable    4. Other male erectile dysfunction  Viagra when needed             Follow up in about 3 months (around 8/13/2025) for Follow up Established, Review labs.

## 2025-07-23 DIAGNOSIS — R35.1 NOCTURIA: ICD-10-CM

## 2025-07-23 RX ORDER — TAMSULOSIN HYDROCHLORIDE 0.4 MG/1
0.8 CAPSULE ORAL DAILY
Qty: 180 CAPSULE | Refills: 3 | Status: SHIPPED | OUTPATIENT
Start: 2025-07-23 | End: 2026-07-18

## 2025-07-28 ENCOUNTER — TELEPHONE (OUTPATIENT)
Dept: UROLOGY | Facility: CLINIC | Age: 70
End: 2025-07-28
Payer: MEDICARE

## 2025-07-28 NOTE — TELEPHONE ENCOUNTER
Paper work for medication refill/renewal is on providers desk for review and signature.  WJ, RHONDAN

## 2025-07-29 RX ORDER — FLUTICASONE PROPIONATE 50 MCG
SPRAY, SUSPENSION (ML) NASAL
Refills: 0 | OUTPATIENT
Start: 2025-07-29

## 2025-07-29 NOTE — TELEPHONE ENCOUNTER
Refill Decision Note   Anthony Rockweiler  is requesting a refill authorization.  Brief Assessment and Rationale for Refill:  Quick Discontinue     Medication Therapy Plan:   Pharmacy is requesting new scripts for the following medications without required information, (sig/ frequency/qty/etc)         Comments: Pharmacies have been requesting medications for patients without required information, (sig, frequency, qty, etc.). In addition, requests are sent for medication(s) pt. are currently not taking, and medications patients have never taken.    We have spoken to the pharmacies about these request types and advised their teams previously that we are unable to assess these New Script requests and require all details for these requests. This is a known issue and has been reported.      Note composed:3:17 PM 07/29/2025

## 2025-07-29 NOTE — TELEPHONE ENCOUNTER
No care due was identified.  [facility] Embedded Care Due Messages. Reference number: [id number].   7/29/2025 10:00:34 AM CDT

## 2025-07-31 ENCOUNTER — TELEPHONE (OUTPATIENT)
Dept: UROLOGY | Facility: CLINIC | Age: 70
End: 2025-07-31
Payer: MEDICARE

## 2025-07-31 DIAGNOSIS — N52.8 OTHER MALE ERECTILE DYSFUNCTION: Primary | ICD-10-CM

## 2025-07-31 RX ORDER — SILDENAFIL 100 MG/1
100 TABLET, FILM COATED ORAL DAILY PRN
Qty: 10 TABLET | Refills: 11 | Status: SHIPPED | OUTPATIENT
Start: 2025-07-31

## 2025-07-31 NOTE — TELEPHONE ENCOUNTER
Copied from CRM #[id number]. Topic: Medications - New Medication Request  >> Jul 31, 2025  3:05 PM Noy wrote:  Type: RX Refill Request    Who called: Patient     Refill or New Rx: New    Rx name and Strength: sildenafiL (VIAGRA) 100 MG tablet     States been trying to fill prescription for a few weeks with no response; please advise    Would the patient rather a call back or a response via [facility]? Call    Best call back number: [phone]    Additional information:  [facility] - [address]  [address]  [place] [address]  Phone: [phone] Fax: [phone]

## 2025-08-11 DIAGNOSIS — E29.1 HYPOGONADISM IN MALE: ICD-10-CM

## 2025-08-12 RX ORDER — TESTOSTERONE CYPIONATE 200 MG/ML
100 INJECTION, SOLUTION INTRAMUSCULAR
Qty: 10 ML | Refills: 0 | Status: SHIPPED | OUTPATIENT
Start: 2025-08-12 | End: 2026-02-10

## 2025-08-13 ENCOUNTER — LAB VISIT (OUTPATIENT)
Dept: LAB | Facility: HOSPITAL | Age: 70
End: 2025-08-13
Attending: UROLOGY
Payer: MEDICARE

## 2025-08-13 DIAGNOSIS — C61 PROSTATE CANCER: ICD-10-CM

## 2025-08-13 LAB
ABSOLUTE EOSINOPHIL (OHS): 0.17 K/UL
ABSOLUTE MONOCYTE (OHS): 0.82 K/UL (ref 0.3–1)
ABSOLUTE NEUTROPHIL COUNT (OHS): 4.45 K/UL (ref 1.8–7.7)
ALBUMIN SERPL BCP-MCNC: 4.1 G/DL (ref 3.5–5.2)
ALP SERPL-CCNC: 64 UNIT/L (ref 40–150)
ALT SERPL W/O P-5'-P-CCNC: 41 UNIT/L (ref 0–55)
ANION GAP (OHS): 9 MMOL/L (ref 8–16)
AST SERPL-CCNC: 25 UNIT/L (ref 0–50)
BASOPHILS # BLD AUTO: 0.09 K/UL
BASOPHILS NFR BLD AUTO: 1.3 %
BILIRUB SERPL-MCNC: 0.5 MG/DL (ref 0.1–1)
BUN SERPL-MCNC: 15 MG/DL (ref 8–23)
CALCIUM SERPL-MCNC: 9.4 MG/DL (ref 8.7–10.5)
CHLORIDE SERPL-SCNC: 106 MMOL/L (ref 95–110)
CO2 SERPL-SCNC: 24 MMOL/L (ref 23–29)
CREAT SERPL-MCNC: 1.1 MG/DL (ref 0.5–1.4)
ERYTHROCYTE [DISTWIDTH] IN BLOOD BY AUTOMATED COUNT: 14.1 % (ref 11.5–14.5)
GFR SERPLBLD CREATININE-BSD FMLA CKD-EPI: >60 ML/MIN/1.73/M2
GLUCOSE SERPL-MCNC: 186 MG/DL (ref 70–110)
HCT VFR BLD AUTO: 51.3 % (ref 40–54)
HGB BLD-MCNC: 16.3 GM/DL (ref 14–18)
IMM GRANULOCYTES # BLD AUTO: 0.02 K/UL (ref 0–0.04)
IMM GRANULOCYTES NFR BLD AUTO: 0.3 % (ref 0–0.5)
LYMPHOCYTES # BLD AUTO: 1.51 K/UL (ref 1–4.8)
MCH RBC QN AUTO: 27.2 PG (ref 27–31)
MCHC RBC AUTO-ENTMCNC: 31.8 G/DL (ref 32–36)
MCV RBC AUTO: 86 FL (ref 82–98)
NUCLEATED RBC (/100WBC) (OHS): 0 /100 WBC
PLATELET # BLD AUTO: 256 K/UL (ref 150–450)
PMV BLD AUTO: 10.4 FL (ref 9.2–12.9)
POTASSIUM SERPL-SCNC: 4.1 MMOL/L (ref 3.5–5.1)
PROT SERPL-MCNC: 7.1 GM/DL (ref 6–8.4)
PSA SERPL-MCNC: 2.07 NG/ML
RBC # BLD AUTO: 6 M/UL (ref 4.6–6.2)
RELATIVE EOSINOPHIL (OHS): 2.4 %
RELATIVE LYMPHOCYTE (OHS): 21.4 % (ref 18–48)
RELATIVE MONOCYTE (OHS): 11.6 % (ref 4–15)
RELATIVE NEUTROPHIL (OHS): 63 % (ref 38–73)
SODIUM SERPL-SCNC: 139 MMOL/L (ref 136–145)
TESTOST SERPL-MCNC: 625 NG/DL (ref 304–1227)
WBC # BLD AUTO: 7.06 K/UL (ref 3.9–12.7)

## 2025-08-13 PROCEDURE — 85025 COMPLETE CBC W/AUTO DIFF WBC: CPT | Mod: HCNC

## 2025-08-13 PROCEDURE — 84403 ASSAY OF TOTAL TESTOSTERONE: CPT | Mod: HCNC

## 2025-08-13 PROCEDURE — 84153 ASSAY OF PSA TOTAL: CPT | Mod: HCNC

## 2025-08-13 PROCEDURE — 36415 COLL VENOUS BLD VENIPUNCTURE: CPT | Mod: HCNC,PO

## 2025-08-13 PROCEDURE — 82040 ASSAY OF SERUM ALBUMIN: CPT | Mod: HCNC

## 2025-08-21 ENCOUNTER — OFFICE VISIT (OUTPATIENT)
Dept: UROLOGY | Facility: CLINIC | Age: 70
End: 2025-08-21
Payer: MEDICARE

## 2025-08-21 DIAGNOSIS — N52.8 OTHER MALE ERECTILE DYSFUNCTION: ICD-10-CM

## 2025-08-21 DIAGNOSIS — R35.1 NOCTURIA: ICD-10-CM

## 2025-08-21 DIAGNOSIS — C61 PROSTATE CANCER: Primary | ICD-10-CM

## 2025-08-21 DIAGNOSIS — E29.1 HYPOGONADISM IN MALE: ICD-10-CM

## 2025-08-21 PROCEDURE — 1125F AMNT PAIN NOTED PAIN PRSNT: CPT | Mod: CPTII,HCNC,S$GLB, | Performed by: UROLOGY

## 2025-08-21 PROCEDURE — 1159F MED LIST DOCD IN RCRD: CPT | Mod: CPTII,HCNC,S$GLB, | Performed by: UROLOGY

## 2025-08-21 PROCEDURE — 3044F HG A1C LEVEL LT 7.0%: CPT | Mod: CPTII,HCNC,S$GLB, | Performed by: UROLOGY

## 2025-08-21 PROCEDURE — 1160F RVW MEDS BY RX/DR IN RCRD: CPT | Mod: CPTII,HCNC,S$GLB, | Performed by: UROLOGY

## 2025-08-21 PROCEDURE — 99214 OFFICE O/P EST MOD 30 MIN: CPT | Mod: HCNC,S$GLB,, | Performed by: UROLOGY

## 2025-08-21 PROCEDURE — 3288F FALL RISK ASSESSMENT DOCD: CPT | Mod: CPTII,HCNC,S$GLB, | Performed by: UROLOGY

## 2025-08-21 PROCEDURE — 4010F ACE/ARB THERAPY RXD/TAKEN: CPT | Mod: CPTII,HCNC,S$GLB, | Performed by: UROLOGY

## 2025-08-21 PROCEDURE — 1101F PT FALLS ASSESS-DOCD LE1/YR: CPT | Mod: CPTII,HCNC,S$GLB, | Performed by: UROLOGY

## 2025-08-21 PROCEDURE — 99999 PR PBB SHADOW E&M-EST. PATIENT-LVL III: CPT | Mod: PBBFAC,HCNC,, | Performed by: UROLOGY

## (undated) DEVICE — BLADE CLIPPER SENICLIP SURGICA

## (undated) DEVICE — UNDERGLOVES BIOGEL PI SIZE 7.5

## (undated) DEVICE — BLLN ENDOCAVITY 2X14CM

## (undated) DEVICE — PACK CYSTOSCOPY III SIRUS

## (undated) DEVICE — TRAY CYSTO BASIN OMC

## (undated) DEVICE — SET PROSTATE STABILIZATION 18G

## (undated) DEVICE — UNDERGLOVES BIOGEL PI SZ 7 LF

## (undated) DEVICE — SYR 50CC LL

## (undated) DEVICE — UNDERGLOVES BIOGEL PI SIZE 8

## (undated) DEVICE — COVER TRANSDUCER LATEX N/STERI

## (undated) DEVICE — PLUG CATHETER STERILE FOLEY

## (undated) DEVICE — SOL WATER STRL IRR 1000ML

## (undated) DEVICE — SYR 50ML CATH TIP

## (undated) DEVICE — SYR 10CC LUER LOCK

## (undated) DEVICE — Device